# Patient Record
Sex: FEMALE | Race: WHITE | NOT HISPANIC OR LATINO | URBAN - METROPOLITAN AREA
[De-identification: names, ages, dates, MRNs, and addresses within clinical notes are randomized per-mention and may not be internally consistent; named-entity substitution may affect disease eponyms.]

---

## 2017-11-10 ENCOUNTER — EMERGENCY (EMERGENCY)
Facility: HOSPITAL | Age: 42
LOS: 1 days | Discharge: HOME | End: 2017-11-10
Attending: EMERGENCY MEDICINE | Admitting: EMERGENCY MEDICINE
Payer: COMMERCIAL

## 2017-11-10 VITALS
SYSTOLIC BLOOD PRESSURE: 125 MMHG | DIASTOLIC BLOOD PRESSURE: 80 MMHG | OXYGEN SATURATION: 97 % | TEMPERATURE: 98 F | RESPIRATION RATE: 16 BRPM | HEART RATE: 81 BPM

## 2017-11-10 VITALS
HEART RATE: 108 BPM | RESPIRATION RATE: 18 BRPM | OXYGEN SATURATION: 95 % | DIASTOLIC BLOOD PRESSURE: 84 MMHG | WEIGHT: 186.07 LBS | SYSTOLIC BLOOD PRESSURE: 154 MMHG | HEIGHT: 66 IN | TEMPERATURE: 98 F

## 2017-11-10 PROBLEM — Z00.00 ENCOUNTER FOR PREVENTIVE HEALTH EXAMINATION: Status: ACTIVE | Noted: 2017-11-10

## 2017-11-10 LAB
ALBUMIN SERPL ELPH-MCNC: 3.5 G/DL — SIGNIFICANT CHANGE UP (ref 3.3–5)
ALP SERPL-CCNC: 76 U/L — SIGNIFICANT CHANGE UP (ref 40–120)
ALT FLD-CCNC: 21 U/L — SIGNIFICANT CHANGE UP (ref 12–78)
ANION GAP SERPL CALC-SCNC: 9 MMOL/L — SIGNIFICANT CHANGE UP (ref 5–17)
APPEARANCE UR: CLEAR — SIGNIFICANT CHANGE UP
AST SERPL-CCNC: 13 U/L — LOW (ref 15–37)
BASOPHILS # BLD AUTO: 0.1 K/UL — SIGNIFICANT CHANGE UP (ref 0–0.2)
BASOPHILS NFR BLD AUTO: 0.9 % — SIGNIFICANT CHANGE UP (ref 0–2)
BILIRUB SERPL-MCNC: 0.3 MG/DL — SIGNIFICANT CHANGE UP (ref 0.2–1.2)
BILIRUB UR-MCNC: NEGATIVE — SIGNIFICANT CHANGE UP
BUN SERPL-MCNC: 10 MG/DL — SIGNIFICANT CHANGE UP (ref 7–23)
CALCIUM SERPL-MCNC: 8.2 MG/DL — LOW (ref 8.5–10.1)
CHLORIDE SERPL-SCNC: 107 MMOL/L — SIGNIFICANT CHANGE UP (ref 96–108)
CO2 SERPL-SCNC: 24 MMOL/L — SIGNIFICANT CHANGE UP (ref 22–31)
COLOR SPEC: YELLOW — SIGNIFICANT CHANGE UP
CREAT SERPL-MCNC: 0.87 MG/DL — SIGNIFICANT CHANGE UP (ref 0.5–1.3)
D DIMER BLD IA.RAPID-MCNC: 216 NG/ML DDU — SIGNIFICANT CHANGE UP
DIFF PNL FLD: NEGATIVE — SIGNIFICANT CHANGE UP
EOSINOPHIL # BLD AUTO: 0.4 K/UL — SIGNIFICANT CHANGE UP (ref 0–0.5)
EOSINOPHIL NFR BLD AUTO: 3.5 % — SIGNIFICANT CHANGE UP (ref 0–6)
EPI CELLS # UR: ABNORMAL
GLUCOSE SERPL-MCNC: 79 MG/DL — SIGNIFICANT CHANGE UP (ref 70–99)
GLUCOSE UR QL: NEGATIVE — SIGNIFICANT CHANGE UP
HCG SERPL-ACNC: <1 MIU/ML — SIGNIFICANT CHANGE UP
HCT VFR BLD CALC: 42.4 % — SIGNIFICANT CHANGE UP (ref 34.5–45)
HGB BLD-MCNC: 13.9 G/DL — SIGNIFICANT CHANGE UP (ref 11.5–15.5)
KETONES UR-MCNC: NEGATIVE — SIGNIFICANT CHANGE UP
LACTATE SERPL-SCNC: 0.9 MMOL/L — SIGNIFICANT CHANGE UP (ref 0.7–2)
LEUKOCYTE ESTERASE UR-ACNC: ABNORMAL
LYMPHOCYTES # BLD AUTO: 1.3 K/UL — SIGNIFICANT CHANGE UP (ref 1–3.3)
LYMPHOCYTES # BLD AUTO: 11.5 % — LOW (ref 13–44)
MCHC RBC-ENTMCNC: 29.9 PG — SIGNIFICANT CHANGE UP (ref 27–34)
MCHC RBC-ENTMCNC: 32.8 GM/DL — SIGNIFICANT CHANGE UP (ref 32–36)
MCV RBC AUTO: 91.3 FL — SIGNIFICANT CHANGE UP (ref 80–100)
MONOCYTES # BLD AUTO: 0.6 K/UL — SIGNIFICANT CHANGE UP (ref 0–0.9)
MONOCYTES NFR BLD AUTO: 5.6 % — SIGNIFICANT CHANGE UP (ref 1–9)
NEUTROPHILS # BLD AUTO: 9 K/UL — HIGH (ref 1.8–7.4)
NEUTROPHILS NFR BLD AUTO: 78.4 % — HIGH (ref 43–77)
NITRITE UR-MCNC: NEGATIVE — SIGNIFICANT CHANGE UP
PH UR: 8 — SIGNIFICANT CHANGE UP (ref 5–8)
PLATELET # BLD AUTO: 372 K/UL — SIGNIFICANT CHANGE UP (ref 150–400)
POTASSIUM SERPL-MCNC: 4.2 MMOL/L — SIGNIFICANT CHANGE UP (ref 3.5–5.3)
POTASSIUM SERPL-SCNC: 4.2 MMOL/L — SIGNIFICANT CHANGE UP (ref 3.5–5.3)
PROT SERPL-MCNC: 7.6 G/DL — SIGNIFICANT CHANGE UP (ref 6–8.3)
PROT UR-MCNC: NEGATIVE — SIGNIFICANT CHANGE UP
RBC # BLD: 4.64 M/UL — SIGNIFICANT CHANGE UP (ref 3.8–5.2)
RBC # FLD: 14.5 % — SIGNIFICANT CHANGE UP (ref 10.3–14.5)
RBC CASTS # UR COMP ASSIST: SIGNIFICANT CHANGE UP /HPF (ref 0–4)
SODIUM SERPL-SCNC: 140 MMOL/L — SIGNIFICANT CHANGE UP (ref 135–145)
SP GR SPEC: 1 — LOW (ref 1.01–1.02)
UROBILINOGEN FLD QL: NEGATIVE — SIGNIFICANT CHANGE UP
WBC # BLD: 11.4 K/UL — HIGH (ref 3.8–10.5)
WBC # FLD AUTO: 11.4 K/UL — HIGH (ref 3.8–10.5)
WBC UR QL: ABNORMAL

## 2017-11-10 PROCEDURE — 85379 FIBRIN DEGRADATION QUANT: CPT

## 2017-11-10 PROCEDURE — 99284 EMERGENCY DEPT VISIT MOD MDM: CPT | Mod: 25

## 2017-11-10 PROCEDURE — 96374 THER/PROPH/DIAG INJ IV PUSH: CPT | Mod: XU

## 2017-11-10 PROCEDURE — 83605 ASSAY OF LACTIC ACID: CPT

## 2017-11-10 PROCEDURE — 71275 CT ANGIOGRAPHY CHEST: CPT | Mod: 26

## 2017-11-10 PROCEDURE — 84702 CHORIONIC GONADOTROPIN TEST: CPT

## 2017-11-10 PROCEDURE — 80053 COMPREHEN METABOLIC PANEL: CPT

## 2017-11-10 PROCEDURE — 94640 AIRWAY INHALATION TREATMENT: CPT

## 2017-11-10 PROCEDURE — 85027 COMPLETE CBC AUTOMATED: CPT

## 2017-11-10 PROCEDURE — 87040 BLOOD CULTURE FOR BACTERIA: CPT

## 2017-11-10 PROCEDURE — 99285 EMERGENCY DEPT VISIT HI MDM: CPT

## 2017-11-10 PROCEDURE — 81001 URINALYSIS AUTO W/SCOPE: CPT

## 2017-11-10 PROCEDURE — 71275 CT ANGIOGRAPHY CHEST: CPT

## 2017-11-10 RX ORDER — MONTELUKAST 4 MG/1
10 TABLET, CHEWABLE ORAL ONCE
Qty: 0 | Refills: 0 | Status: COMPLETED | OUTPATIENT
Start: 2017-11-10 | End: 2017-11-10

## 2017-11-10 RX ORDER — FLUTICASONE PROPIONATE 50 MCG
2 SPRAY, SUSPENSION NASAL ONCE
Qty: 0 | Refills: 0 | Status: COMPLETED | OUTPATIENT
Start: 2017-11-10 | End: 2017-11-10

## 2017-11-10 RX ORDER — IPRATROPIUM/ALBUTEROL SULFATE 18-103MCG
3 AEROSOL WITH ADAPTER (GRAM) INHALATION
Qty: 84 | Refills: 0 | OUTPATIENT
Start: 2017-11-10 | End: 2017-11-17

## 2017-11-10 RX ORDER — IPRATROPIUM/ALBUTEROL SULFATE 18-103MCG
3 AEROSOL WITH ADAPTER (GRAM) INHALATION ONCE
Qty: 0 | Refills: 0 | Status: COMPLETED | OUTPATIENT
Start: 2017-11-10 | End: 2017-11-10

## 2017-11-10 RX ORDER — ALPRAZOLAM 0.25 MG
0.5 TABLET ORAL ONCE
Qty: 0 | Refills: 0 | Status: DISCONTINUED | OUTPATIENT
Start: 2017-11-10 | End: 2017-11-10

## 2017-11-10 RX ORDER — KETOROLAC TROMETHAMINE 30 MG/ML
30 SYRINGE (ML) INJECTION ONCE
Qty: 0 | Refills: 0 | Status: DISCONTINUED | OUTPATIENT
Start: 2017-11-10 | End: 2017-11-10

## 2017-11-10 RX ORDER — MONTELUKAST 4 MG/1
1 TABLET, CHEWABLE ORAL
Qty: 30 | Refills: 0 | OUTPATIENT
Start: 2017-11-10 | End: 2017-12-10

## 2017-11-10 RX ORDER — SODIUM CHLORIDE 9 MG/ML
1000 INJECTION INTRAMUSCULAR; INTRAVENOUS; SUBCUTANEOUS ONCE
Qty: 0 | Refills: 0 | Status: COMPLETED | OUTPATIENT
Start: 2017-11-10 | End: 2017-11-10

## 2017-11-10 RX ORDER — ALPRAZOLAM 0.25 MG
1 TABLET ORAL
Qty: 15 | Refills: 0
Start: 2017-11-10

## 2017-11-10 RX ADMIN — Medication 2 SPRAY(S): at 11:28

## 2017-11-10 RX ADMIN — Medication 3 MILLILITER(S): at 10:46

## 2017-11-10 RX ADMIN — Medication 30 MILLIGRAM(S): at 13:12

## 2017-11-10 RX ADMIN — MONTELUKAST 10 MILLIGRAM(S): 4 TABLET, CHEWABLE ORAL at 11:28

## 2017-11-10 RX ADMIN — Medication 0.5 MILLIGRAM(S): at 14:23

## 2017-11-10 RX ADMIN — SODIUM CHLORIDE 1000 MILLILITER(S): 9 INJECTION INTRAMUSCULAR; INTRAVENOUS; SUBCUTANEOUS at 10:46

## 2017-11-10 NOTE — ED PROVIDER NOTE - MEDICAL DECISION MAKING DETAILS
cough sob pleuritic pain ro pe ro pn ro rad exacerbation vs exacerbation of anxiety and uri in complex pt with above as pmhx

## 2017-11-10 NOTE — ED ADULT NURSE NOTE - OBJECTIVE STATEMENT
Afebrile pt to ed c/o SOB due to severe sinus, chest congestion x 5 weeks. pt was to PMD was prescribed several medications and still feels ill.

## 2017-11-10 NOTE — ED PROVIDER NOTE - OBJECTIVE STATEMENT
Pt is a 43 yo f who has hx of anxiety, Asthma and Crohn's OCD, on 6-MP prilosec has been coughing with cold like symptoms since 10/19.  she has been on various regimines of nebs steroids etc by pmd, urgent care and NP at pmd office. still not better, coughing feeling panicked and short of breath (she is usually a runner) with back pain. she came to er today not able to wait for the appoinement by her pmd monday. Pt is a 43 yo f who has hx of anxiety, Asthma and Crohn's OCD, on 6-MP prilosec has been coughing with cold like symptoms since 10/19.  she has been on various regimines of nebs steroids etc by pmd, urgent care and NP at pmd office. still not better, coughing feeling panicked and short of breath (she is usually a runner) with back pain. she came to er today not able to wait for the appointment by her pmd monday.

## 2017-11-10 NOTE — ED PROVIDER NOTE - ENMT, MLM
Airway patent, Nasal mucosa clear. Mouth with normal mucosa. OP post nasal drip. Throat has no vesicles, no oropharyngeal exudates and uvula is midline. tm's clear

## 2017-11-10 NOTE — ED PROVIDER NOTE - PSYCHIATRIC, MLM
Alert and oriented to person, place, time/situation. appropriate but slightly anxious mood and affect. no apparent risk to self or others.

## 2017-11-12 ENCOUNTER — TRANSCRIPTION ENCOUNTER (OUTPATIENT)
Age: 42
End: 2017-11-12

## 2017-11-15 LAB
CULTURE RESULTS: SIGNIFICANT CHANGE UP
CULTURE RESULTS: SIGNIFICANT CHANGE UP
SPECIMEN SOURCE: SIGNIFICANT CHANGE UP
SPECIMEN SOURCE: SIGNIFICANT CHANGE UP

## 2017-11-17 ENCOUNTER — INPATIENT (INPATIENT)
Facility: HOSPITAL | Age: 42
LOS: 4 days | Discharge: HOME | DRG: 194 | End: 2017-11-22
Attending: INTERNAL MEDICINE | Admitting: INTERNAL MEDICINE
Payer: COMMERCIAL

## 2017-11-17 VITALS
HEIGHT: 66 IN | WEIGHT: 179.9 LBS | DIASTOLIC BLOOD PRESSURE: 82 MMHG | TEMPERATURE: 98 F | SYSTOLIC BLOOD PRESSURE: 138 MMHG | OXYGEN SATURATION: 98 % | RESPIRATION RATE: 16 BRPM | HEART RATE: 98 BPM

## 2017-11-17 DIAGNOSIS — R06.02 SHORTNESS OF BREATH: ICD-10-CM

## 2017-11-17 DIAGNOSIS — R07.9 CHEST PAIN, UNSPECIFIED: ICD-10-CM

## 2017-11-17 DIAGNOSIS — K50.90 CROHN'S DISEASE, UNSPECIFIED, WITHOUT COMPLICATIONS: ICD-10-CM

## 2017-11-17 DIAGNOSIS — R21 RASH AND OTHER NONSPECIFIC SKIN ERUPTION: ICD-10-CM

## 2017-11-17 DIAGNOSIS — K21.9 GASTRO-ESOPHAGEAL REFLUX DISEASE WITHOUT ESOPHAGITIS: ICD-10-CM

## 2017-11-17 DIAGNOSIS — R06.89 OTHER ABNORMALITIES OF BREATHING: ICD-10-CM

## 2017-11-17 DIAGNOSIS — Z29.9 ENCOUNTER FOR PROPHYLACTIC MEASURES, UNSPECIFIED: ICD-10-CM

## 2017-11-17 DIAGNOSIS — J45.909 UNSPECIFIED ASTHMA, UNCOMPLICATED: ICD-10-CM

## 2017-11-17 DIAGNOSIS — F41.9 ANXIETY DISORDER, UNSPECIFIED: ICD-10-CM

## 2017-11-17 LAB
ALBUMIN SERPL ELPH-MCNC: 3.5 G/DL — SIGNIFICANT CHANGE UP (ref 3.3–5)
ALP SERPL-CCNC: 71 U/L — SIGNIFICANT CHANGE UP (ref 40–120)
ALT FLD-CCNC: 19 U/L — SIGNIFICANT CHANGE UP (ref 12–78)
AMPHET UR-MCNC: NEGATIVE — SIGNIFICANT CHANGE UP
ANION GAP SERPL CALC-SCNC: 10 MMOL/L — SIGNIFICANT CHANGE UP (ref 5–17)
APTT BLD: 29.2 SEC — SIGNIFICANT CHANGE UP (ref 27.5–37.4)
AST SERPL-CCNC: 11 U/L — LOW (ref 15–37)
BARBITURATES UR SCN-MCNC: NEGATIVE — SIGNIFICANT CHANGE UP
BASOPHILS # BLD AUTO: 0.1 K/UL — SIGNIFICANT CHANGE UP (ref 0–0.2)
BASOPHILS NFR BLD AUTO: 0.3 % — SIGNIFICANT CHANGE UP (ref 0–2)
BENZODIAZ UR-MCNC: POSITIVE — SIGNIFICANT CHANGE UP
BILIRUB SERPL-MCNC: 0.3 MG/DL — SIGNIFICANT CHANGE UP (ref 0.2–1.2)
BUN SERPL-MCNC: 10 MG/DL — SIGNIFICANT CHANGE UP (ref 7–23)
CALCIUM SERPL-MCNC: 8.5 MG/DL — SIGNIFICANT CHANGE UP (ref 8.5–10.1)
CHLORIDE SERPL-SCNC: 107 MMOL/L — SIGNIFICANT CHANGE UP (ref 96–108)
CO2 SERPL-SCNC: 24 MMOL/L — SIGNIFICANT CHANGE UP (ref 22–31)
COCAINE METAB.OTHER UR-MCNC: NEGATIVE — SIGNIFICANT CHANGE UP
CREAT SERPL-MCNC: 0.92 MG/DL — SIGNIFICANT CHANGE UP (ref 0.5–1.3)
EOSINOPHIL # BLD AUTO: 0 K/UL — SIGNIFICANT CHANGE UP (ref 0–0.5)
EOSINOPHIL NFR BLD AUTO: 0.1 % — SIGNIFICANT CHANGE UP (ref 0–6)
GLUCOSE SERPL-MCNC: 110 MG/DL — HIGH (ref 70–99)
HCG SERPL-ACNC: <1 MIU/ML — SIGNIFICANT CHANGE UP
HCT VFR BLD CALC: 40.6 % — SIGNIFICANT CHANGE UP (ref 34.5–45)
HGB BLD-MCNC: 13.5 G/DL — SIGNIFICANT CHANGE UP (ref 11.5–15.5)
INR BLD: 1.07 RATIO — SIGNIFICANT CHANGE UP (ref 0.88–1.16)
LACTATE SERPL-SCNC: 1.8 MMOL/L — SIGNIFICANT CHANGE UP (ref 0.7–2)
LYMPHOCYTES # BLD AUTO: 0.5 K/UL — LOW (ref 1–3.3)
LYMPHOCYTES # BLD AUTO: 2.8 % — LOW (ref 13–44)
MCHC RBC-ENTMCNC: 30.4 PG — SIGNIFICANT CHANGE UP (ref 27–34)
MCHC RBC-ENTMCNC: 33.2 GM/DL — SIGNIFICANT CHANGE UP (ref 32–36)
MCV RBC AUTO: 91.7 FL — SIGNIFICANT CHANGE UP (ref 80–100)
METHADONE UR-MCNC: NEGATIVE — SIGNIFICANT CHANGE UP
MONOCYTES # BLD AUTO: 0.2 K/UL — SIGNIFICANT CHANGE UP (ref 0–0.9)
MONOCYTES NFR BLD AUTO: 1.4 % — SIGNIFICANT CHANGE UP (ref 1–9)
NEUTROPHILS # BLD AUTO: 17.3 K/UL — HIGH (ref 1.8–7.4)
NEUTROPHILS NFR BLD AUTO: 95.4 % — HIGH (ref 43–77)
OPIATES UR-MCNC: NEGATIVE — SIGNIFICANT CHANGE UP
PCP SPEC-MCNC: SIGNIFICANT CHANGE UP
PCP UR-MCNC: NEGATIVE — SIGNIFICANT CHANGE UP
PLATELET # BLD AUTO: 353 K/UL — SIGNIFICANT CHANGE UP (ref 150–400)
POTASSIUM SERPL-MCNC: 3.8 MMOL/L — SIGNIFICANT CHANGE UP (ref 3.5–5.3)
POTASSIUM SERPL-SCNC: 3.8 MMOL/L — SIGNIFICANT CHANGE UP (ref 3.5–5.3)
PROT SERPL-MCNC: 7.8 G/DL — SIGNIFICANT CHANGE UP (ref 6–8.3)
PROTHROM AB SERPL-ACNC: 11.7 SEC — SIGNIFICANT CHANGE UP (ref 9.8–12.7)
RBC # BLD: 4.43 M/UL — SIGNIFICANT CHANGE UP (ref 3.8–5.2)
RBC # FLD: 14.8 % — HIGH (ref 10.3–14.5)
SODIUM SERPL-SCNC: 141 MMOL/L — SIGNIFICANT CHANGE UP (ref 135–145)
THC UR QL: NEGATIVE — SIGNIFICANT CHANGE UP
WBC # BLD: 18.1 K/UL — HIGH (ref 3.8–10.5)
WBC # FLD AUTO: 18.1 K/UL — HIGH (ref 3.8–10.5)

## 2017-11-17 PROCEDURE — 99223 1ST HOSP IP/OBS HIGH 75: CPT

## 2017-11-17 PROCEDURE — 71020: CPT | Mod: 26

## 2017-11-17 PROCEDURE — 93010 ELECTROCARDIOGRAM REPORT: CPT

## 2017-11-17 PROCEDURE — 99285 EMERGENCY DEPT VISIT HI MDM: CPT

## 2017-11-17 RX ORDER — ALPRAZOLAM 0.25 MG
0.5 TABLET ORAL DAILY
Qty: 0 | Refills: 0 | Status: DISCONTINUED | OUTPATIENT
Start: 2017-11-17 | End: 2017-11-22

## 2017-11-17 RX ORDER — ALBUTEROL 90 UG/1
0 AEROSOL, METERED ORAL
Qty: 0 | Refills: 0 | COMMUNITY

## 2017-11-17 RX ORDER — PANTOPRAZOLE SODIUM 20 MG/1
40 TABLET, DELAYED RELEASE ORAL
Qty: 0 | Refills: 0 | Status: DISCONTINUED | OUTPATIENT
Start: 2017-11-17 | End: 2017-11-22

## 2017-11-17 RX ORDER — BUDESONIDE AND FORMOTEROL FUMARATE DIHYDRATE 160; 4.5 UG/1; UG/1
2 AEROSOL RESPIRATORY (INHALATION)
Qty: 0 | Refills: 0 | Status: DISCONTINUED | OUTPATIENT
Start: 2017-11-17 | End: 2017-11-22

## 2017-11-17 RX ORDER — MERCAPTOPURINE 50 MG/1
75 TABLET ORAL DAILY
Qty: 0 | Refills: 0 | Status: DISCONTINUED | OUTPATIENT
Start: 2017-11-17 | End: 2017-11-17

## 2017-11-17 RX ORDER — IPRATROPIUM/ALBUTEROL SULFATE 18-103MCG
3 AEROSOL WITH ADAPTER (GRAM) INHALATION ONCE
Qty: 0 | Refills: 0 | Status: COMPLETED | OUTPATIENT
Start: 2017-11-17 | End: 2017-11-17

## 2017-11-17 RX ORDER — SODIUM CHLORIDE 0.65 %
1 AEROSOL, SPRAY (ML) NASAL THREE TIMES A DAY
Qty: 0 | Refills: 0 | Status: DISCONTINUED | OUTPATIENT
Start: 2017-11-17 | End: 2017-11-22

## 2017-11-17 RX ORDER — MERCAPTOPURINE 50 MG/1
75 TABLET ORAL DAILY
Qty: 0 | Refills: 0 | Status: DISCONTINUED | OUTPATIENT
Start: 2017-11-18 | End: 2017-11-22

## 2017-11-17 RX ORDER — ALBUTEROL 90 UG/1
2 AEROSOL, METERED ORAL EVERY 6 HOURS
Qty: 0 | Refills: 0 | Status: DISCONTINUED | OUTPATIENT
Start: 2017-11-17 | End: 2017-11-22

## 2017-11-17 RX ORDER — POLYETHYLENE GLYCOL 3350 17 G/17G
17 POWDER, FOR SOLUTION ORAL DAILY
Qty: 0 | Refills: 0 | Status: DISCONTINUED | OUTPATIENT
Start: 2017-11-17 | End: 2017-11-22

## 2017-11-17 RX ORDER — MERCAPTOPURINE 50 MG/1
0 TABLET ORAL
Qty: 0 | Refills: 0 | COMMUNITY

## 2017-11-17 RX ORDER — OMEPRAZOLE 10 MG/1
1 CAPSULE, DELAYED RELEASE ORAL
Qty: 0 | Refills: 0 | COMMUNITY

## 2017-11-17 RX ORDER — DULOXETINE HYDROCHLORIDE 30 MG/1
60 CAPSULE, DELAYED RELEASE ORAL DAILY
Qty: 0 | Refills: 0 | Status: DISCONTINUED | OUTPATIENT
Start: 2017-11-17 | End: 2017-11-22

## 2017-11-17 RX ORDER — OMEPRAZOLE 10 MG/1
0 CAPSULE, DELAYED RELEASE ORAL
Qty: 0 | Refills: 0 | COMMUNITY

## 2017-11-17 RX ADMIN — Medication 3 MILLILITER(S): at 16:15

## 2017-11-17 RX ADMIN — BUDESONIDE AND FORMOTEROL FUMARATE DIHYDRATE 2 PUFF(S): 160; 4.5 AEROSOL RESPIRATORY (INHALATION) at 20:41

## 2017-11-17 RX ADMIN — Medication 0.5 MILLIGRAM(S): at 22:27

## 2017-11-17 RX ADMIN — Medication 125 MILLIGRAM(S): at 16:15

## 2017-11-17 NOTE — H&P ADULT - ASSESSMENT
43 y/o f pmhx anxiety, Crohn's disease on 6-MP, asthma, GERD presented with SOB since October 19 with recent courses of failed PO abx and steroids admitted for SOB and bronchoscopy.

## 2017-11-17 NOTE — CONSULT NOTE ADULT - ASSESSMENT
42F with PMH anxiety, Crohn's disease on 6-MP, asthma presents with SOB since October 19.     -Chest pain, atypical. Likely due to viral respiratory infection. Reports symptoms associated with cough.   -Doubt ACS. EKG without ischemic changes.   -Encourage oral hydration  -HR high normal, likely from underlying infection.   -BP acceptable. 42F with PMH anxiety, Crohn's disease on 6-MP, asthma presents with SOB since October 19.     -Chest pain, atypical. Likely due to viral respiratory infection. Reports symptoms associated with cough. Chest x-ray: clear lungs.   -Doubt ACS. EKG without ischemic changes.   -Encourage oral hydration  -HR high normal, likely from underlying infection.   -BP acceptable. 42F with PMH anxiety, Crohn's disease on 6-MP, asthma presents with SOB since October 19.     -Chest pain, atypical. Likely due to respiratory infection. Reports symptoms associated with cough. Chest x-ray: clear lungs.   -Plan for bronchoscopy Monday. Currently no active cardiac conditions. No signs of ischemia, ADHF, clinical exam not consistent with stenotic valvular disease, no unstable arrythmias noted. Therefore able to proceed with this low risk procedure without any further cardiac workup. Routine hemodynamic monitoring is suggested during the procedure.   -Able to walk >4 METS without any anginal symptoms.   -Doubt ACS. EKG without ischemic changes.   -Follows outpatient cardiologist, reports normal echo and coronary CT.  -HR high normal, likely from underlying infection.   -BP acceptable.   -Monitor electrolytes, keep K>4, Mg>2 42F with PMH anxiety, Crohn's disease on 6-MP, asthma presents with SOB since October 19.     -Chest pain, atypical. Likely due to respiratory infection. Reports symptoms associated with cough. Chest x-ray: clear lungs.   -Plan for bronchoscopy Monday. Currently no active cardiac conditions. No signs of ischemia, ADHF, clinical exam not consistent with stenotic valvular disease, no unstable arrythmias noted. Therefore able to proceed with this low risk procedure without any further cardiac workup. Routine hemodynamic monitoring is suggested during the procedure.   -Able to walk >4 METS without any anginal symptoms.   -Follows outpatient cardiologist Dr. Garay, reports normal echo and coronary CT.  Dr. Garay to assume care tomorrow.  -HR high normal, likely from underlying pulmonary process  -BP acceptable.   -Monitor electrolytes, keep K>4, Mg>2

## 2017-11-17 NOTE — H&P ADULT - PROBLEM SELECTOR PLAN 2
Atypical, likely 2/2 tightness from cough/asthma symptoms  -Continue Proventil, Symbicort  -F/u cardiac enzymes Atypical, likely 2/2 tightness from cough/asthma symptoms  -Dr. Brothers consulted, appreciate recs  -F/u cardiac enzymes

## 2017-11-17 NOTE — H&P ADULT - PMH
Anxiety    Asthma    Crohn's disease    GERD (gastroesophageal reflux disease)    OCD (obsessive compulsive disorder)

## 2017-11-17 NOTE — ED PROVIDER NOTE - CARE PLAN
Principal Discharge DX:	Chest pain, unspecified type  Secondary Diagnosis:	SOB (shortness of breath)  Secondary Diagnosis:	Cough

## 2017-11-17 NOTE — H&P ADULT - HISTORY OF PRESENT ILLNESS
43 y/o f pmhx anxiety, Crohn's disease on 6-MP, asthma, GERD presented with SOB since October 19. Reported drinking a large [employer] coffee in the beginning of October and noticed mold at the bottom of the cup. A few days later, developed fever 101-102, cough with productive green sputum, and chest tightness associated with the cough. The patient was seen by her PMD who rx Augmentin. Fever resolved, productive cough continued. The following week she was seen by urgent care as she was not better and sent home with steroids. The cough/SOB did not resolve and she was seen by Pulmonologist, Dr. Castaneda and given Levaquin, inhaler, and prednisone. She did not feel better and saw an allergist who took her off of all of the medications. Since she continued to have cough/SOB she returned to her pulmonologist who put her back on 60mg qd of Prednisone. Pt admitted to productive cough with green sputum, headache, stuffy nose. She also admitted to 1 episode of blood tinged sputum weeks ago which has not returned and was attributed to trauma from the cough. She  Denied sore throat, fever, chills, abd pain, n/v/d.  Reported daughter at home is sick with sinusitis. Denied recent travel.     Of note, patient recently in the ED on November 10/2017 for similar symptoms. Blood cultures negative. CT angio showed subtle bilateral micronodular groundglass centrilobular tree-in-bud opacities; which may reflect a bronchiolitis. No lobar lung consolidation or pleural effusion.     In the ED: /82, HR 98  WBC 18.1  CXR: The lungs are clear. The previously noted micronodular groundglass centrilobular opacities are not well seen on chest x-ray examination. The heart size is normal. The visualized osseous structures are unremarkable. 41 y/o f pmhx anxiety, Crohn's disease on 6-MP, asthma, GERD presented with SOB since October 19. Reported drinking a large [employer] coffee in the beginning of October and noticed mold at the bottom of the cup. A few days later, developed fever 101-102, cough with productive green sputum, and chest tightness associated with the cough. The patient was seen by her PMD who rx Augmentin. Fever resolved, productive cough continued. The following week she was seen by urgent care as she was not better and sent home with steroids. The cough/SOB did not resolve and she was seen by Pulmonologist, Dr. Castaneda and given Levaquin, inhaler, and prednisone. She did not feel better and saw an allergist who took her off of all of the medications. Since she continued to have cough/SOB she returned to her pulmonologist who put her back on 60mg qd of Prednisone. Pt admitted to productive cough with green sputum, headache, stuffy nose. She also admitted to 1 episode of blood tinged sputum weeks ago which has not returned and was attributed to trauma from the cough. She  Denied sore throat, fever, chills, abd pain, n/v/d.  Reported daughter at home is sick with sinusitis. Denied recent travel.     Of note, patient recently in the ED on November 10/2017 for similar symptoms. Blood cultures negative. CT angio showed subtle bilateral micronodular groundglass centrilobular tree-in-bud opacities; which may reflect a bronchiolitis. No lobar lung consolidation or pleural effusion.     In the ED: /82, HR 98  WBC 18.1  CXR: The lungs are clear. The previously noted micronodular groundglass centrilobular opacities are not well seen on chest x-ray examination. The heart size is normal. The visualized osseous structures are unremarkable.  Pt received IV solumedrol and duonebs.

## 2017-11-17 NOTE — H&P ADULT - PROBLEM SELECTOR PLAN 3
-Continue  -Geovanna and Aris arnold prn for cough -Continue Proventil, Symbicort  -Carolesen and Aris arnold prn for cough

## 2017-11-17 NOTE — H&P ADULT - ATTENDING COMMENTS
patrient seen and examined   pt is a return patient --she was here with a cough and sob since october.  ct scan of the lungs showed tree bud apperance.  has been on streoids for the past month with no help.  pulm consult appreciated.  pt is scgheduled to get a bhronscopy on monday  will continue steroids and abx form now  and prepare for bronch on monday

## 2017-11-17 NOTE — CONSULT NOTE ADULT - PROBLEM SELECTOR RECOMMENDATION 9
ct chest reviewed  spoke with Dr. Amaro - Pulmonary medicine  will start robitussin, tessalon perles PRN  proventil PRN  symbicort   hold off on steroids and ABX  Bronchoscopy on Monday, discussed with pt and family  check Lupus profile, crp, procalcitonin, IgE, IgG subsets, aspergillus,   thyroid panel, toxicology panel  hypersensitivity panel  sputum cx and sx

## 2017-11-17 NOTE — ED ADULT NURSE NOTE - OBJECTIVE STATEMENT
Pt presents with sob, recently diagnosed with bronchitis, currently states she is anxious and EKG presents with tachycardia, reports chest pain when coughs, the cough is productive, green Pt presents with sob, recently diagnosed with bronchitis, currently states she is anxious and EKG presents with tachycardia, reports chest pain when coughs, the cough is productive, green sputum, placed on cardiac monitor and pulse ox, educated on plan of care and use of medications, verbalized understanding, awaiting dispo

## 2017-11-17 NOTE — H&P ADULT - NSHPREVIEWOFSYSTEMS_GEN_ALL_CORE
Constitutional: denies fever, chills, diaphoresis   HEENT: denies blurry vision, double vision, eye pain, difficulty hearing  Respiratory: +SOB, +GOLD, +cough, +green sputum production, denies wheezing, +hemoptysis (x1 episode, now resolved)  Cardiovascular: denies CP, palpitations, edema, +elevated HR sometimes  Gastrointestinal: denies nausea, vomiting, diarrhea, constipation, abdominal pain, melena, hematochezia   Genitourinary: denies dysuria, frequency, urgency, hematuria   Skin/Breast: denies rash, itching  Musculoskeletal: denies myalgias, joint swelling, muscle weakness  Neurologic: denies headache, weakness, dizziness, paresthesias, numbness/tingling  Psychiatric: +anxiety, denies depressed, suicidal, homicidal thoughts  Hematology/Oncology: denies bruising, tender or enlarged lymph nodes   ROS negative except as noted above

## 2017-11-17 NOTE — ED PROVIDER NOTE - OBJECTIVE STATEMENT
pt c/o 4 weeks of sob and cough with chest discomfort. pt seen mult times as outpt and once in er last week, had labs/cta, dx with bronchiolitis. pt had courses of augmentin, prednisone, levaquin, without improvement. no fevers, chills, ha, abd pain, edema, calf pain, travel.  pmd - cristi  pulm - oscar

## 2017-11-17 NOTE — H&P ADULT - PROBLEM SELECTOR PLAN 1
Admit to [facility]  -Dr. Voss consulted, appreciate recs  -Broncoscopy on Monday  -Proventil and Symbicort prn  -Geovanna and Aris arnold prn for cough  -Hold abx and steroids per pulm  -[place] spray Admit to [facility]  -Dr. Voss consulted, appreciate recs  -Broncoscopy on Monday  -Proventil and Symbicort prn  -Geovanna and Aris arnold prn for cough  -Hold steroids   -IV Levaquin  -IVF  -[place] spray  -F/u aspergillus Ab, certromere Ab, allergy panel, lupus profile, thyroid studies  -F/u blood and urine culture

## 2017-11-17 NOTE — H&P ADULT - NSHPPHYSICALEXAM_GEN_ALL_CORE
Physical Exam:  General: Well developed, well nourished, NAD  HEENT: NCAT, PERRLA, EOMI bl, moist mucous membranes   Neck: Supple, nontender, no mass  Neurology: A&Ox3, nonfocal, CN II-XII grossly intact, sensation intact, no gait abnormalities   Respiratory: CTA B/L, No W/R/R  CV: RRR, +S1/S2, no murmurs, rubs or gallops  Abdominal: Soft, NT, ND +BSx4  Extremities: No C/C/E, + peripheral pulses  MSK: Normal ROM, no joint erythema or warmth, no joint swelling   Skin: warm, dry, normal color, macular rash on mid to low back and collar of neck, non-painful, non-pruritic

## 2017-11-17 NOTE — ED ADULT TRIAGE NOTE - CHIEF COMPLAINT QUOTE
pt was sent by Dr. Castaneda (pulmonary) for SOB, r/o asthma/pneumonia pt was sent by Dr. Castaneda (pulmonary) for SOB, r/o pneumonia

## 2017-11-18 LAB
ANION GAP SERPL CALC-SCNC: 10 MMOL/L — SIGNIFICANT CHANGE UP (ref 5–17)
BUN SERPL-MCNC: 9 MG/DL — SIGNIFICANT CHANGE UP (ref 7–23)
CALCIUM SERPL-MCNC: 8.2 MG/DL — LOW (ref 8.5–10.1)
CENTROMERE AB SER-ACNC: <0.2 AI — SIGNIFICANT CHANGE UP
CHLORIDE SERPL-SCNC: 108 MMOL/L — SIGNIFICANT CHANGE UP (ref 96–108)
CO2 SERPL-SCNC: 24 MMOL/L — SIGNIFICANT CHANGE UP (ref 22–31)
CREAT SERPL-MCNC: 0.81 MG/DL — SIGNIFICANT CHANGE UP (ref 0.5–1.3)
CRP SERPL-MCNC: 0.4 MG/DL — SIGNIFICANT CHANGE UP (ref 0–0.4)
ENA JO1 AB SER-ACNC: <0.2 AI — SIGNIFICANT CHANGE UP
GLUCOSE SERPL-MCNC: 96 MG/DL — SIGNIFICANT CHANGE UP (ref 70–99)
HCT VFR BLD CALC: 36.7 % — SIGNIFICANT CHANGE UP (ref 34.5–45)
HGB BLD-MCNC: 12 G/DL — SIGNIFICANT CHANGE UP (ref 11.5–15.5)
IGE SERPL-ACNC: 20 IU/ML — SIGNIFICANT CHANGE UP (ref 0–100)
MCHC RBC-ENTMCNC: 30.1 PG — SIGNIFICANT CHANGE UP (ref 27–34)
MCHC RBC-ENTMCNC: 32.6 GM/DL — SIGNIFICANT CHANGE UP (ref 32–36)
MCV RBC AUTO: 92.4 FL — SIGNIFICANT CHANGE UP (ref 80–100)
PLATELET # BLD AUTO: 312 K/UL — SIGNIFICANT CHANGE UP (ref 150–400)
POTASSIUM SERPL-MCNC: 3.7 MMOL/L — SIGNIFICANT CHANGE UP (ref 3.5–5.3)
POTASSIUM SERPL-SCNC: 3.7 MMOL/L — SIGNIFICANT CHANGE UP (ref 3.5–5.3)
RBC # BLD: 3.97 M/UL — SIGNIFICANT CHANGE UP (ref 3.8–5.2)
RBC # FLD: 14.4 % — SIGNIFICANT CHANGE UP (ref 10.3–14.5)
SODIUM SERPL-SCNC: 142 MMOL/L — SIGNIFICANT CHANGE UP (ref 135–145)
T3 SERPL-MCNC: 75 NG/DL — LOW (ref 80–200)
T4 AB SER-ACNC: 6.4 UG/DL — SIGNIFICANT CHANGE UP (ref 4.6–12)
WBC # BLD: 13.6 K/UL — HIGH (ref 3.8–10.5)
WBC # FLD AUTO: 13.6 K/UL — HIGH (ref 3.8–10.5)

## 2017-11-18 RX ADMIN — Medication 0.5 MILLIGRAM(S): at 21:02

## 2017-11-18 RX ADMIN — ALBUTEROL 2 PUFF(S): 90 AEROSOL, METERED ORAL at 06:08

## 2017-11-18 RX ADMIN — POLYETHYLENE GLYCOL 3350 17 GRAM(S): 17 POWDER, FOR SOLUTION ORAL at 11:54

## 2017-11-18 RX ADMIN — Medication 1 SPRAY(S): at 21:03

## 2017-11-18 RX ADMIN — Medication 1 SPRAY(S): at 06:06

## 2017-11-18 RX ADMIN — DULOXETINE HYDROCHLORIDE 60 MILLIGRAM(S): 30 CAPSULE, DELAYED RELEASE ORAL at 11:54

## 2017-11-18 RX ADMIN — Medication 100 MILLIGRAM(S): at 10:09

## 2017-11-18 RX ADMIN — Medication 1 SPRAY(S): at 14:04

## 2017-11-18 RX ADMIN — BUDESONIDE AND FORMOTEROL FUMARATE DIHYDRATE 2 PUFF(S): 160; 4.5 AEROSOL RESPIRATORY (INHALATION) at 06:11

## 2017-11-18 RX ADMIN — MERCAPTOPURINE 75 MILLIGRAM(S): 50 TABLET ORAL at 11:53

## 2017-11-18 RX ADMIN — PANTOPRAZOLE SODIUM 40 MILLIGRAM(S): 20 TABLET, DELAYED RELEASE ORAL at 06:06

## 2017-11-18 RX ADMIN — Medication 1 SPRAY(S): at 00:50

## 2017-11-18 NOTE — PROGRESS NOTE ADULT - ASSESSMENT
41 y/o f pmhx anxiety, Crohn's disease on 6-MP, asthma, GERD presented with SOB since October 19 with recent courses of failed PO abx and steroids admitted for SOB and bronchoscopy.

## 2017-11-18 NOTE — PROGRESS NOTE ADULT - SUBJECTIVE AND OBJECTIVE BOX
Patient is a 42y old  Female who presents with a chief complaint of SOB cough (17 Nov 2017 19:41)      INTERVAL HPI/OVERNIGHT EVENTS: Patient seen and examined. NAD. Still SOB with exertion.    Vital Signs Last 24 Hrs  T(C): 36.6 (18 Nov 2017 05:03), Max: 36.7 (17 Nov 2017 14:40)  T(F): 97.9 (18 Nov 2017 05:03), Max: 98.1 (17 Nov 2017 20:26)  HR: 89 (18 Nov 2017 05:03) (89 - 105)  BP: 111/70 (18 Nov 2017 05:03) (111/70 - 138/82)  BP(mean): --  RR: 16 (18 Nov 2017 05:03) (16 - 18)  SpO2: 93% (18 Nov 2017 05:03) (93% - 99%)    11-18    142  |  108  |  9   ----------------------------<  96  3.7   |  24  |  0.81    Ca    8.2<L>      18 Nov 2017 06:22  Mg     2.1     11-17    TPro  7.8  /  Alb  3.5  /  TBili  0.3  /  DBili  x   /  AST  11<L>  /  ALT  19  /  AlkPhos  71  11-17                          12.0   13.6  )-----------( 312      ( 18 Nov 2017 06:22 )             36.7     PT/INR - ( 17 Nov 2017 16:24 )   PT: 11.7 sec;   INR: 1.07 ratio         PTT - ( 17 Nov 2017 16:24 )  PTT:29.2 sec  CAPILLARY BLOOD GLUCOSE              ALBUTerol    90 MICROgram(s) HFA Inhaler 2 Puff(s) Inhalation every 6 hours PRN  ALPRAZolam 0.5 milliGRAM(s) Oral daily PRN  benzonatate 100 milliGRAM(s) Oral every 8 hours PRN  buDESOnide  80 MICROgram(s)/formoterol 4.5 MICROgram(s) Inhaler 2 Puff(s) Inhalation two times a day  DULoxetine 60 milliGRAM(s) Oral daily  guaiFENesin    Syrup 200 milliGRAM(s) Oral every 6 hours PRN  mercaptopurine 75 milliGRAM(s) Oral daily  pantoprazole    Tablet 40 milliGRAM(s) Oral before breakfast  polyethylene glycol 3350 17 Gram(s) Oral daily  sodium chloride 0.65% Nasal 1 Spray(s) Both Nostrils three times a day      REVIEW OF SYSTEMS:  CONSTITUTIONAL: No fever, no weight loss, or no fatigue  NECK: No pain, no stiffness  RESPIRATORY: No cough, no wheezing, no chills, no hemoptysis, + shortness of breath  CARDIOVASCULAR: No chest pain, no palpitations, no dizziness, no leg swelling  GASTROINTESTINAL: No abdominal pain. No nausea, no vomiting, no hematemesis; No diarrhea, no constipation. No melena, no hematochezia.  GENITOURINARY: No dysuria, no frequency, no hematuria, no incontinence  NEUROLOGICAL: No headaches, no loss of strength, no numbness, no tremors  SKIN: No itching, no burning  MUSCULOSKELETAL: No joint pain, no swelling; No muscle, no back, no extremity pain  PSYCHIATRIC: No depression, no mood swings,   HEME/LYMPH: No easy bruising, no bleeding gums  ALLERY AND IMMUNOLOGIC: No hives       Consultant(s) Notes Reviewed:  [x ] YES  [ ] NO    PHYSICAL EXAM:  GENERAL: NAD  HEAD:  Atraumatic, Normocephalic  EYES: EOMI, PERRLA, conjunctiva and sclera clear  ENMT: No tonsillar erythema, exudates, or enlargement; Moist mucous membranes  NECK: Supple, No JVD  NERVOUS SYSTEM:  Awake & alert  CHEST/LUNG: ronchi L > R  HEART: Regular rate and rhythm  ABDOMEN: Soft, Nontender, Nondistended; Bowel sounds present  EXTREMITIES:  No clubbing, cyanosis, or edema  LYMPH: No lymphadenopathy noted  SKIN: No rashes    Advanced care planning discussed with patient/family. Advanced care planning forms reviewed/discussed/completed. 20 minutes spent.

## 2017-11-18 NOTE — PROGRESS NOTE ADULT - SUBJECTIVE AND OBJECTIVE BOX
Date/Time Patient Seen:  		  Referring MD:   Data Reviewed	       Patient is a 42y old  Female who presents with a chief complaint of SOB cough (17 Nov 2017 19:41)  in bed  seen and examined  on ABX      Subjective/HPI     PAST MEDICAL & SURGICAL HISTORY:  GERD (gastroesophageal reflux disease)  Anxiety  Asthma  Crohn's disease  OCD (obsessive compulsive disorder)  No significant past surgical history        Medication list         MEDICATIONS  (STANDING):  buDESOnide  80 MICROgram(s)/formoterol 4.5 MICROgram(s) Inhaler 2 Puff(s) Inhalation two times a day  DULoxetine 60 milliGRAM(s) Oral daily  mercaptopurine 75 milliGRAM(s) Oral daily  pantoprazole    Tablet 40 milliGRAM(s) Oral before breakfast  polyethylene glycol 3350 17 Gram(s) Oral daily  sodium chloride 0.65% Nasal 1 Spray(s) Both Nostrils three times a day    MEDICATIONS  (PRN):  ALBUTerol    90 MICROgram(s) HFA Inhaler 2 Puff(s) Inhalation every 6 hours PRN Shortness of Breath and/or Wheezing  ALPRAZolam 0.5 milliGRAM(s) Oral daily PRN anxiety  benzonatate 100 milliGRAM(s) Oral every 8 hours PRN Cough  guaiFENesin    Syrup 200 milliGRAM(s) Oral every 6 hours PRN Cough         Vitals log        ICU Vital Signs Last 24 Hrs  T(C): 36.6 (18 Nov 2017 05:03), Max: 36.7 (17 Nov 2017 14:40)  T(F): 97.9 (18 Nov 2017 05:03), Max: 98.1 (17 Nov 2017 20:26)  HR: 89 (18 Nov 2017 05:03) (89 - 105)  BP: 111/70 (18 Nov 2017 05:03) (111/70 - 138/82)  BP(mean): --  ABP: --  ABP(mean): --  RR: 16 (18 Nov 2017 05:03) (16 - 18)  SpO2: 93% (18 Nov 2017 05:03) (93% - 99%)           Input and Output:  I&O's Detail      Lab Data                        13.5   18.1  )-----------( 353      ( 17 Nov 2017 16:24 )             40.6     11-17    141  |  107  |  10  ----------------------------<  110<H>  3.8   |  24  |  0.92    Ca    8.5      17 Nov 2017 16:24  Mg     2.1     11-17    TPro  7.8  /  Alb  3.5  /  TBili  0.3  /  DBili  x   /  AST  11<L>  /  ALT  19  /  AlkPhos  71  11-17      CARDIAC MARKERS ( 17 Nov 2017 16:24 )  <.015 ng/mL / x     / 48 U/L / x     / <0.5 ng/mL        Review of Systems	      Objective     Physical Examination    obese  head at  heart - s1s2  lungs - dec BS  abd - soft  cn grossly int  moves all extr      Pertinent Lab findings & Imaging      [place]:  NO   Adequate UO     I&O's Detail           Discussed with:     Cultures:	        Radiology

## 2017-11-18 NOTE — PROGRESS NOTE ADULT - PROBLEM SELECTOR PLAN 1
-Dr. Voss consulted, appreciate recs  -Broncoscopy on Monday  -Proventil and Symbicort prn  -Geovanna and Aris arnold prn for cough  -Hold steroids and antibiotic as per pulmonary  -IVF  -[place] spray  -F/u aspergillus Ab, certromere Ab, allergy panel, lupus profile, thyroid studies  -F/u blood and urine culture

## 2017-11-18 NOTE — PROGRESS NOTE ADULT - PROBLEM SELECTOR PLAN 2
Atypical, likely 2/2 tightness from cough/asthma symptoms  -Dr. Brothers consulted, appreciate recs  -F/u cardiac enzymes

## 2017-11-19 RX ORDER — ACETAMINOPHEN 500 MG
650 TABLET ORAL ONCE
Qty: 0 | Refills: 0 | Status: COMPLETED | OUTPATIENT
Start: 2017-11-19 | End: 2017-11-19

## 2017-11-19 RX ORDER — ALBUTEROL 90 UG/1
2.5 AEROSOL, METERED ORAL EVERY 8 HOURS
Qty: 0 | Refills: 0 | Status: DISCONTINUED | OUTPATIENT
Start: 2017-11-19 | End: 2017-11-22

## 2017-11-19 RX ORDER — ACETAMINOPHEN 500 MG
650 TABLET ORAL EVERY 6 HOURS
Qty: 0 | Refills: 0 | Status: DISCONTINUED | OUTPATIENT
Start: 2017-11-19 | End: 2017-11-22

## 2017-11-19 RX ORDER — PSEUDOEPHEDRINE HCL 30 MG
30 TABLET ORAL ONCE
Qty: 0 | Refills: 0 | Status: COMPLETED | OUTPATIENT
Start: 2017-11-19 | End: 2017-11-19

## 2017-11-19 RX ORDER — ALPRAZOLAM 0.25 MG
0.5 TABLET ORAL AT BEDTIME
Qty: 0 | Refills: 0 | Status: DISCONTINUED | OUTPATIENT
Start: 2017-11-19 | End: 2017-11-22

## 2017-11-19 RX ORDER — OXYCODONE AND ACETAMINOPHEN 5; 325 MG/1; MG/1
1 TABLET ORAL EVERY 4 HOURS
Qty: 0 | Refills: 0 | Status: DISCONTINUED | OUTPATIENT
Start: 2017-11-19 | End: 2017-11-22

## 2017-11-19 RX ORDER — ALBUTEROL 90 UG/1
2.5 AEROSOL, METERED ORAL ONCE
Qty: 0 | Refills: 0 | Status: COMPLETED | OUTPATIENT
Start: 2017-11-19 | End: 2017-11-19

## 2017-11-19 RX ADMIN — PANTOPRAZOLE SODIUM 40 MILLIGRAM(S): 20 TABLET, DELAYED RELEASE ORAL at 06:21

## 2017-11-19 RX ADMIN — Medication 1 SPRAY(S): at 21:31

## 2017-11-19 RX ADMIN — MERCAPTOPURINE 75 MILLIGRAM(S): 50 TABLET ORAL at 18:30

## 2017-11-19 RX ADMIN — OXYCODONE AND ACETAMINOPHEN 1 TABLET(S): 5; 325 TABLET ORAL at 09:35

## 2017-11-19 RX ADMIN — ALBUTEROL 2.5 MILLIGRAM(S): 90 AEROSOL, METERED ORAL at 21:25

## 2017-11-19 RX ADMIN — Medication 0.5 MILLIGRAM(S): at 21:31

## 2017-11-19 RX ADMIN — OXYCODONE AND ACETAMINOPHEN 1 TABLET(S): 5; 325 TABLET ORAL at 10:15

## 2017-11-19 RX ADMIN — ALBUTEROL 2 PUFF(S): 90 AEROSOL, METERED ORAL at 07:20

## 2017-11-19 RX ADMIN — ALBUTEROL 2 PUFF(S): 90 AEROSOL, METERED ORAL at 18:26

## 2017-11-19 RX ADMIN — Medication 200 MILLIGRAM(S): at 05:28

## 2017-11-19 RX ADMIN — Medication 0.5 MILLIGRAM(S): at 08:41

## 2017-11-19 RX ADMIN — ALBUTEROL 2.5 MILLIGRAM(S): 90 AEROSOL, METERED ORAL at 15:34

## 2017-11-19 RX ADMIN — Medication 650 MILLIGRAM(S): at 05:23

## 2017-11-19 RX ADMIN — ALBUTEROL 2.5 MILLIGRAM(S): 90 AEROSOL, METERED ORAL at 07:59

## 2017-11-19 RX ADMIN — Medication 1 SPRAY(S): at 05:25

## 2017-11-19 RX ADMIN — BUDESONIDE AND FORMOTEROL FUMARATE DIHYDRATE 2 PUFF(S): 160; 4.5 AEROSOL RESPIRATORY (INHALATION) at 12:30

## 2017-11-19 RX ADMIN — BUDESONIDE AND FORMOTEROL FUMARATE DIHYDRATE 2 PUFF(S): 160; 4.5 AEROSOL RESPIRATORY (INHALATION) at 18:30

## 2017-11-19 RX ADMIN — POLYETHYLENE GLYCOL 3350 17 GRAM(S): 17 POWDER, FOR SOLUTION ORAL at 12:31

## 2017-11-19 RX ADMIN — Medication 1 SPRAY(S): at 13:50

## 2017-11-19 RX ADMIN — Medication 30 MILLIGRAM(S): at 06:19

## 2017-11-19 RX ADMIN — ALBUTEROL 2.5 MILLIGRAM(S): 90 AEROSOL, METERED ORAL at 05:42

## 2017-11-19 RX ADMIN — DULOXETINE HYDROCHLORIDE 60 MILLIGRAM(S): 30 CAPSULE, DELAYED RELEASE ORAL at 12:31

## 2017-11-19 NOTE — PROGRESS NOTE ADULT - SUBJECTIVE AND OBJECTIVE BOX
Patient is a 42y old  Female who presents with a chief complaint of SOB cough (17 Nov 2017 19:41)  still complains of cough, dypnea on exertion    INTERVAL HPI/OVERNIGHT EVENTS:  T(C): 36.6 (11-19-17 @ 13:18), Max: 36.8 (11-18-17 @ 20:54)  HR: 100 (11-19-17 @ 15:35) (70 - 104)  BP: 108/74 (11-19-17 @ 13:18) (108/74 - 125/82)  RR: 17 (11-19-17 @ 13:18) (16 - 17)  SpO2: 95% (11-19-17 @ 13:18) (94% - 96%)  Wt(kg): --  I&O's Summary    18 Nov 2017 07:01  -  19 Nov 2017 07:00  --------------------------------------------------------  IN: 360 mL / OUT: 1 mL / NET: 359 mL    19 Nov 2017 07:01  -  19 Nov 2017 17:00  --------------------------------------------------------  IN: 840 mL / OUT: 0 mL / NET: 840 mL        LABS:                        12.0   13.6  )-----------( 312      ( 18 Nov 2017 06:22 )             36.7     11-18    142  |  108  |  9   ----------------------------<  96  3.7   |  24  |  0.81    Ca    8.2<L>      18 Nov 2017 06:22            MEDICATIONS  (STANDING):  ALBUTerol    0.083% 2.5 milliGRAM(s) Nebulizer every 8 hours  ALPRAZolam 0.5 milliGRAM(s) Oral at bedtime  buDESOnide  80 MICROgram(s)/formoterol 4.5 MICROgram(s) Inhaler 2 Puff(s) Inhalation two times a day  DULoxetine 60 milliGRAM(s) Oral daily  mercaptopurine 75 milliGRAM(s) Oral daily  pantoprazole    Tablet 40 milliGRAM(s) Oral before breakfast  polyethylene glycol 3350 17 Gram(s) Oral daily  sodium chloride 0.65% Nasal 1 Spray(s) Both Nostrils three times a day    MEDICATIONS  (PRN):  acetaminophen   Tablet. 650 milliGRAM(s) Oral every 6 hours PRN Mild Pain (1 - 3)  ALBUTerol    90 MICROgram(s) HFA Inhaler 2 Puff(s) Inhalation every 6 hours PRN Shortness of Breath and/or Wheezing  ALPRAZolam 0.5 milliGRAM(s) Oral daily PRN anxiety  benzonatate 100 milliGRAM(s) Oral every 8 hours PRN Cough  guaiFENesin    Syrup 200 milliGRAM(s) Oral every 6 hours PRN Cough  oxyCODONE    5 mG/acetaminophen 325 mG 1 Tablet(s) Oral every 4 hours PRN Moderate Pain (4 - 6)      REVIEW OF SYSTEMS:  CONSTITUTIONAL: No fever, weight loss, or fatigue  EYES: No eye pain, visual disturbances, or discharge  ENMT:  No difficulty hearing, tinnitus, vertigo; No sinus or throat pain  NECK: No pain or stiffness  resp: cough and dyspnea  CARDIOVASCULAR: ant chest pressure improved  GASTROINTESTINAL: No abdominal or epigastric pain. No nausea, vomiting, or hematemesis; No diarrhea or constipation. No melena or hematochezia.  GENITOURINARY: No dysuria, frequency, hematuria, or incontinence  NEUROLOGICAL: No headaches, memory loss, loss of strength, numbness, or tremors  SKIN: No itching, burning, rashes, or lesions   LYMPH NODES: No enlarged glands  ENDOCRINE: No heat or cold intolerance; No hair loss  MUSCULOSKELETAL: No joint pain or swelling; No muscle, back, or extremity pain  PSYCHIATRIC: No depression, anxiety, mood swings, or difficulty sleeping  HEME/LYMPH: No easy bruising, or bleeding gums  ALLERY AND IMMUNOLOGIC: No hives or eczema    RADIOLOGY & ADDITIONAL TESTS:    Imaging Personally Reviewed:  [x ] YES  [ ] NO    Consultant(s) Notes Reviewed:  [x ] YES  [ ] NO    PHYSICAL EXAM:  GENERAL: NAD, well-groomed, well-developed  HEAD:  Atraumatic, Normocephalic  EYES: EOMI, PERRLA, conjunctiva and sclera clear  ENMT: No tonsillar erythema, exudates, or enlargement; Moist mucous membranes, Good dentition, No lesions  NECK: Supple, No JVD, Normal thyroid  NERVOUS SYSTEM:  Alert & Oriented X3, Good concentration; Motor Strength 5/5 B/L upper and lower extremities; DTRs 2+ intact and symmetric  CHEST/LUNG: coarse bs bl  HEART: Regular rate and rhythm; No murmurs, rubs, or gallops  ABDOMEN: Soft, Nontender, Nondistended; Bowel sounds present  EXTREMITIES:  2+ Peripheral Pulses, No clubbing, cyanosis, or edema  LYMPH: No lymphadenopathy noted  SKIN: No rashes or lesions    Care Discussed with Consultants/Other Providers [x ] YES  [ ] NO

## 2017-11-19 NOTE — PROGRESS NOTE ADULT - SUBJECTIVE AND OBJECTIVE BOX
Date/Time Patient Seen:  		  Referring MD:   Data Reviewed	       Patient is a 42y old  Female who presents with a chief complaint of SOB cough (17 Nov 2017 19:41)  c/o earache  in bed  seen and examined  vs and meds reviewed  awake  alert      Subjective/HPI     PAST MEDICAL & SURGICAL HISTORY:  GERD (gastroesophageal reflux disease)  Anxiety  Asthma  Crohn's disease  OCD (obsessive compulsive disorder)  No significant past surgical history        Medication list         MEDICATIONS  (STANDING):  ALBUTerol    0.083% 2.5 milliGRAM(s) Nebulizer every 8 hours  buDESOnide  80 MICROgram(s)/formoterol 4.5 MICROgram(s) Inhaler 2 Puff(s) Inhalation two times a day  DULoxetine 60 milliGRAM(s) Oral daily  mercaptopurine 75 milliGRAM(s) Oral daily  pantoprazole    Tablet 40 milliGRAM(s) Oral before breakfast  polyethylene glycol 3350 17 Gram(s) Oral daily  pseudoephedrine 30 milliGRAM(s) Oral once  sodium chloride 0.65% Nasal 1 Spray(s) Both Nostrils three times a day    MEDICATIONS  (PRN):  acetaminophen   Tablet. 650 milliGRAM(s) Oral every 6 hours PRN Mild Pain (1 - 3)  ALBUTerol    90 MICROgram(s) HFA Inhaler 2 Puff(s) Inhalation every 6 hours PRN Shortness of Breath and/or Wheezing  ALPRAZolam 0.5 milliGRAM(s) Oral daily PRN anxiety  benzonatate 100 milliGRAM(s) Oral every 8 hours PRN Cough  guaiFENesin    Syrup 200 milliGRAM(s) Oral every 6 hours PRN Cough         Vitals log        ICU Vital Signs Last 24 Hrs  T(C): 36.6 (19 Nov 2017 05:25), Max: 36.8 (18 Nov 2017 20:54)  T(F): 97.8 (19 Nov 2017 05:25), Max: 98.2 (18 Nov 2017 20:54)  HR: 70 (19 Nov 2017 05:43) (70 - 93)  BP: 125/82 (19 Nov 2017 05:25) (118/81 - 129/80)  BP(mean): --  ABP: --  ABP(mean): --  RR: 16 (19 Nov 2017 05:25) (16 - 17)  SpO2: 96% (19 Nov 2017 05:43) (95% - 99%)           Input and Output:  I&O's Detail    18 Nov 2017 07:01  -  19 Nov 2017 06:14  --------------------------------------------------------  IN:    Oral Fluid: 360 mL  Total IN: 360 mL    OUT:  Total OUT: 0 mL    Total NET: 360 mL          Lab Data                        12.0   13.6  )-----------( 312      ( 18 Nov 2017 06:22 )             36.7     11-18    142  |  108  |  9   ----------------------------<  96  3.7   |  24  |  0.81    Ca    8.2<L>      18 Nov 2017 06:22  Mg     2.1     11-17    TPro  7.8  /  Alb  3.5  /  TBili  0.3  /  DBili  x   /  AST  11<L>  /  ALT  19  /  AlkPhos  71  11-17      CARDIAC MARKERS ( 17 Nov 2017 16:24 )  <.015 ng/mL / x     / 48 U/L / x     / <0.5 ng/mL        Review of Systems	      Objective     Physical Examination    no external otitis stigmata  head at  heart - s1s2  lungs - clear  abd - soft  moves all extr      Pertinent Lab findings & Imaging      Erin:  NO   Adequate UO     I&O's Detail    18 Nov 2017 07:01  -  19 Nov 2017 06:14  --------------------------------------------------------  IN:    Oral Fluid: 360 mL  Total IN: 360 mL    OUT:  Total OUT: 0 mL    Total NET: 360 mL               Discussed with:     Cultures:	        Radiology

## 2017-11-19 NOTE — PROGRESS NOTE ADULT - PROBLEM SELECTOR PLAN 2
ct chest reviewed  GGO  tree in bud  Bronchoscopy in am on Monday  holding off on ABX and Steroids  monitor resp status  tylenol for earache  sudafed x 1 for upper airway congestion  bronchodilators

## 2017-11-19 NOTE — PROGRESS NOTE ADULT - PROBLEM SELECTOR PLAN 1
-Dr. Voss consulted, appreciate recs  -Broncoscopy on Monday  -Proventil and Symbicort prn  -Geovanna and Aris arnold prn for cough  -Hold steroids and antibiotic as per pulmonary  -IVF  -[place] spray  -F/u aspergillus Ab, certromere Ab, allergy panel, lupus profile, thyroid studies  -F/u blood and urine culture.    bronchiolitis on ct of chest as noted  no medical contraindications to bronchoscopy

## 2017-11-19 NOTE — PROGRESS NOTE ADULT - SUBJECTIVE AND OBJECTIVE BOX
[facility] Cardiology    CHIEF COMPLAINT: Patient is a 42y old  Female who presents with a chief complaint of SOB cough (17 Nov 2017 19:41)      Follow Up: [ ] Chest Pain      [ ] Dyspnea     [ ] Palpitations    [ ] Atrial Fibrillation     [ ] Ventricular Dysrhythmia    [ ] Abnormal EKG                      [ ] Abnormal Cardiac Enzymes     [ ] Valvular Disease    HPI:  41 y/o f pmhx anxiety, Crohn's disease on 6-MP, asthma, GERD presented with SOB since October 19. Reported drinking a large [employer] coffee in the beginning of October and noticed mold at the bottom of the cup. A few days later, developed fever 101-102, cough with productive green sputum, and chest tightness associated with the cough. The patient was seen by her PMD who rx Augmentin. Fever resolved, productive cough continued. The following week she was seen by urgent care as she was not better and sent home with steroids. The cough/SOB did not resolve and she was seen by Pulmonologist, Dr. Castaneda and given Levaquin, inhaler, and prednisone. She did not feel better and saw an allergist who took her off of all of the medications. Since she continued to have cough/SOB she returned to her pulmonologist who put her back on 60mg qd of Prednisone. Pt admitted to productive cough with green sputum, headache, stuffy nose. She also admitted to 1 episode of blood tinged sputum weeks ago which has not returned and was attributed to trauma from the cough. She  Denied sore throat, fever, chills, abd pain, n/v/d.  Reported daughter at home is sick with sinusitis. Denied recent travel.     Of note, patient recently in the ED on November 10/2017 for similar symptoms. Blood cultures negative. CT angio showed subtle bilateral micronodular groundglass centrilobular tree-in-bud opacities; which may reflect a bronchiolitis. No lobar lung consolidation or pleural effusion.     pt had ongoing cough, weakness, atypical L chest/ back pain this am, with increased anxiety, HR  S/P NEBS/Percocet/Xanax with improvement in symptoms    In the ED: /82, HR 98  WBC 18.1  CXR: The lungs are clear. The previously noted micronodular groundglass centrilobular opacities are not well seen on chest x-ray examination. The heart size is normal. The visualized osseous structures are unremarkable.  Pt received IV solumedrol and duonebs. (17 Nov 2017 18:31)      PAST MEDICAL & SURGICAL HISTORY:  GERD (gastroesophageal reflux disease)  Anxiety  Asthma  Crohn's disease  OCD (obsessive compulsive disorder)  No significant past surgical history      MEDICATIONS  (STANDING):  ALBUTerol    0.083% 2.5 milliGRAM(s) Nebulizer every 8 hours  buDESOnide  80 MICROgram(s)/formoterol 4.5 MICROgram(s) Inhaler 2 Puff(s) Inhalation two times a day  DULoxetine 60 milliGRAM(s) Oral daily  mercaptopurine 75 milliGRAM(s) Oral daily  pantoprazole    Tablet 40 milliGRAM(s) Oral before breakfast  polyethylene glycol 3350 17 Gram(s) Oral daily  sodium chloride 0.65% Nasal 1 Spray(s) Both Nostrils three times a day    MEDICATIONS  (PRN):  acetaminophen   Tablet. 650 milliGRAM(s) Oral every 6 hours PRN Mild Pain (1 - 3)  ALBUTerol    90 MICROgram(s) HFA Inhaler 2 Puff(s) Inhalation every 6 hours PRN Shortness of Breath and/or Wheezing  ALPRAZolam 0.5 milliGRAM(s) Oral daily PRN anxiety  benzonatate 100 milliGRAM(s) Oral every 8 hours PRN Cough  guaiFENesin    Syrup 200 milliGRAM(s) Oral every 6 hours PRN Cough  oxyCODONE    5 mG/acetaminophen 325 mG 1 Tablet(s) Oral every 4 hours PRN Moderate Pain (4 - 6)      Allergies    Demerol HCl (Rash)    Intolerances        REVIEW OF SYSTEMS:  CONSTITUTIONAL: No weakness, no fevers   EYES/ENT: No visual changes  NECK: No pain or stiffness  RESPIRATORY: shortness of breath  CARDIOVASCULAR: chest pain or palpitations  GASTROINTESTINAL: No abdominal pain  GENITOURINARY: No hematuria  NEUROLOGICAL: No weakness  SKIN: No rash     Vital Signs Last 24 Hrs  T(C): 36.6 (19 Nov 2017 13:18), Max: 36.8 (18 Nov 2017 20:54)  T(F): 97.8 (19 Nov 2017 13:18), Max: 98.2 (18 Nov 2017 20:54)  HR: 101 (19 Nov 2017 13:18) (70 - 101)  BP: 108/74 (19 Nov 2017 13:18) (108/74 - 125/82)  BP(mean): --  RR: 17 (19 Nov 2017 13:18) (16 - 17)  SpO2: 95% (19 Nov 2017 13:18) (94% - 96%)    I&O's Summary    18 Nov 2017 07:01  -  19 Nov 2017 07:00  --------------------------------------------------------  IN: 360 mL / OUT: 1 mL / NET: 359 mL    19 Nov 2017 07:01  -  19 Nov 2017 14:39  --------------------------------------------------------  IN: 840 mL / OUT: 0 mL / NET: 840 mL        PHYSICAL EXAM:  onstitutional: mild distress  Neurological: Alert and oriented  HEENT: EOMI, no JVD  Cardiovascular: S1 and S2, REG, no murm  Pulmonary: grossly clear  Gastrointestinal: Bowel Sounds present, soft, nontender  Ext: peripheral edema  Skin: No rashes, No cyanosis.  Psych:  Mood & affect appropriate   LABS: All Labs Reviewed:    LABS: All Labs Reviewed:                        12.0   13.6  )-----------( 312      ( 18 Nov 2017 06:22 )             36.7     11-18    142  |  108  |  9   ----------------------------<  96  3.7   |  24  |  0.81    Ca    8.2<L>      18 Nov 2017 06:22  Mg     2.1     11-17    TPro  7.8  /  Alb  3.5  /  TBili  0.3  /  DBili  x   /  AST  11<L>  /  ALT  19  /  AlkPhos  71  11-17    PT/INR - ( 17 Nov 2017 16:24 )   PT: 11.7 sec;   INR: 1.07 ratio         PTT - ( 17 Nov 2017 16:24 )  PTT:29.2 sec  CARDIAC MARKERS ( 17 Nov 2017 16:24 )  <.015 ng/mL / x     / 48 U/L / x     / <0.5 ng/mL      Blood Culture: Organism --  Gram Stain Blood -- Gram Stain --  Specimen Source .Blood Blood-Peripheral  Culture-Blood --        11-17 @ 16:24  TSH: 0.28    42F with PMH anxiety, Crohn's disease on 6-MP, asthma presents with cough/SOB since October 19.  Chronic cough. Chest pain/chest tightness/back pain, atypical.   Hx of normal CCTA last year, with Ca score 0. Echo ordered, and can be done after bronchoscopy.    RADIOLOGY/EKG: NSR, no ST-T changes    No cardiac contraindication to bronchoscopy tomorrow, Monday 11/20.

## 2017-11-20 ENCOUNTER — RESULT REVIEW (OUTPATIENT)
Age: 42
End: 2017-11-20

## 2017-11-20 ENCOUNTER — TRANSCRIPTION ENCOUNTER (OUTPATIENT)
Age: 42
End: 2017-11-20

## 2017-11-20 LAB
ANION GAP SERPL CALC-SCNC: 6 MMOL/L — SIGNIFICANT CHANGE UP (ref 5–17)
AUTO DIFF PNL BLD: NEGATIVE — SIGNIFICANT CHANGE UP
BASOPHILS # BLD AUTO: 0.1 K/UL — SIGNIFICANT CHANGE UP (ref 0–0.2)
BASOPHILS NFR BLD AUTO: 0.9 % — SIGNIFICANT CHANGE UP (ref 0–2)
BUN SERPL-MCNC: 7 MG/DL — SIGNIFICANT CHANGE UP (ref 7–23)
C-ANCA SER-ACNC: NEGATIVE — SIGNIFICANT CHANGE UP
CALCIUM SERPL-MCNC: 8.9 MG/DL — SIGNIFICANT CHANGE UP (ref 8.5–10.1)
CHLORIDE SERPL-SCNC: 104 MMOL/L — SIGNIFICANT CHANGE UP (ref 96–108)
CO2 SERPL-SCNC: 30 MMOL/L — SIGNIFICANT CHANGE UP (ref 22–31)
CREAT SERPL-MCNC: 0.86 MG/DL — SIGNIFICANT CHANGE UP (ref 0.5–1.3)
DRVVT SCREEN TO CONFIRM RATIO: SIGNIFICANT CHANGE UP
EOSINOPHIL # BLD AUTO: 0.2 K/UL — SIGNIFICANT CHANGE UP (ref 0–0.5)
EOSINOPHIL NFR BLD AUTO: 1.9 % — SIGNIFICANT CHANGE UP (ref 0–6)
GLUCOSE SERPL-MCNC: 79 MG/DL — SIGNIFICANT CHANGE UP (ref 70–99)
HCT VFR BLD CALC: 37.8 % — SIGNIFICANT CHANGE UP (ref 34.5–45)
HGB BLD-MCNC: 12.3 G/DL — SIGNIFICANT CHANGE UP (ref 11.5–15.5)
IGG SERPL-MCNC: 854 MG/DL — SIGNIFICANT CHANGE UP (ref 767–1590)
IGG1 SER-MCNC: 411 MG/DL — SIGNIFICANT CHANGE UP (ref 341–894)
IGG2 SER-MCNC: 330 MG/DL — SIGNIFICANT CHANGE UP (ref 171–632)
IGG3 SER-MCNC: 29.4 MG/DL — SIGNIFICANT CHANGE UP (ref 18.4–106)
IGG4 SER-MCNC: 53.5 MG/DL — SIGNIFICANT CHANGE UP (ref 2.4–121)
LA NT DPL PPP QL: 34.7 SEC — SIGNIFICANT CHANGE UP
LYMPHOCYTES # BLD AUTO: 1.1 K/UL — SIGNIFICANT CHANGE UP (ref 1–3.3)
LYMPHOCYTES # BLD AUTO: 13.3 % — SIGNIFICANT CHANGE UP (ref 13–44)
MCHC RBC-ENTMCNC: 29.9 PG — SIGNIFICANT CHANGE UP (ref 27–34)
MCHC RBC-ENTMCNC: 32.5 GM/DL — SIGNIFICANT CHANGE UP (ref 32–36)
MCV RBC AUTO: 92.2 FL — SIGNIFICANT CHANGE UP (ref 80–100)
MONOCYTES # BLD AUTO: 0.6 K/UL — SIGNIFICANT CHANGE UP (ref 0–0.9)
MONOCYTES NFR BLD AUTO: 6.9 % — SIGNIFICANT CHANGE UP (ref 1–9)
NEUTROPHILS # BLD AUTO: 6.6 K/UL — SIGNIFICANT CHANGE UP (ref 1.8–7.4)
NEUTROPHILS NFR BLD AUTO: 76.9 % — SIGNIFICANT CHANGE UP (ref 43–77)
NORMALIZED SCT PPP-RTO: 1.18 RATIO — HIGH (ref 0–1.16)
NORMALIZED SCT PPP-RTO: ABNORMAL
P-ANCA SER-ACNC: NEGATIVE — SIGNIFICANT CHANGE UP
PLATELET # BLD AUTO: 305 K/UL — SIGNIFICANT CHANGE UP (ref 150–400)
POTASSIUM SERPL-MCNC: 4.3 MMOL/L — SIGNIFICANT CHANGE UP (ref 3.5–5.3)
POTASSIUM SERPL-SCNC: 4.3 MMOL/L — SIGNIFICANT CHANGE UP (ref 3.5–5.3)
RAPID RVP RESULT: SIGNIFICANT CHANGE UP
RBC # BLD: 4.1 M/UL — SIGNIFICANT CHANGE UP (ref 3.8–5.2)
RBC # FLD: 14.3 % — SIGNIFICANT CHANGE UP (ref 10.3–14.5)
SODIUM SERPL-SCNC: 140 MMOL/L — SIGNIFICANT CHANGE UP (ref 135–145)
WBC # BLD: 8.6 K/UL — SIGNIFICANT CHANGE UP (ref 3.8–10.5)
WBC # FLD AUTO: 8.6 K/UL — SIGNIFICANT CHANGE UP (ref 3.8–10.5)

## 2017-11-20 PROCEDURE — 88312 SPECIAL STAINS GROUP 1: CPT | Mod: 26

## 2017-11-20 PROCEDURE — 88305 TISSUE EXAM BY PATHOLOGIST: CPT | Mod: 26

## 2017-11-20 PROCEDURE — 88108 CYTOPATH CONCENTRATE TECH: CPT | Mod: 26

## 2017-11-20 RX ORDER — ALBUTEROL 90 UG/1
2.5 AEROSOL, METERED ORAL ONCE
Qty: 0 | Refills: 0 | Status: COMPLETED | OUTPATIENT
Start: 2017-11-20 | End: 2017-11-20

## 2017-11-20 RX ORDER — LORATADINE 10 MG/1
10 TABLET ORAL DAILY
Qty: 0 | Refills: 0 | Status: DISCONTINUED | OUTPATIENT
Start: 2017-11-20 | End: 2017-11-22

## 2017-11-20 RX ADMIN — BUDESONIDE AND FORMOTEROL FUMARATE DIHYDRATE 2 PUFF(S): 160; 4.5 AEROSOL RESPIRATORY (INHALATION) at 21:40

## 2017-11-20 RX ADMIN — Medication 650 MILLIGRAM(S): at 21:42

## 2017-11-20 RX ADMIN — ALBUTEROL 2.5 MILLIGRAM(S): 90 AEROSOL, METERED ORAL at 07:45

## 2017-11-20 RX ADMIN — ALBUTEROL 2.5 MILLIGRAM(S): 90 AEROSOL, METERED ORAL at 16:03

## 2017-11-20 RX ADMIN — Medication 650 MILLIGRAM(S): at 22:12

## 2017-11-20 RX ADMIN — Medication 1 SPRAY(S): at 05:57

## 2017-11-20 RX ADMIN — LORATADINE 10 MILLIGRAM(S): 10 TABLET ORAL at 22:08

## 2017-11-20 RX ADMIN — Medication 1 SPRAY(S): at 21:40

## 2017-11-20 RX ADMIN — PANTOPRAZOLE SODIUM 40 MILLIGRAM(S): 20 TABLET, DELAYED RELEASE ORAL at 06:43

## 2017-11-20 RX ADMIN — BUDESONIDE AND FORMOTEROL FUMARATE DIHYDRATE 2 PUFF(S): 160; 4.5 AEROSOL RESPIRATORY (INHALATION) at 06:43

## 2017-11-20 RX ADMIN — DULOXETINE HYDROCHLORIDE 60 MILLIGRAM(S): 30 CAPSULE, DELAYED RELEASE ORAL at 18:49

## 2017-11-20 RX ADMIN — MERCAPTOPURINE 75 MILLIGRAM(S): 50 TABLET ORAL at 18:49

## 2017-11-20 RX ADMIN — Medication 0.5 MILLIGRAM(S): at 21:39

## 2017-11-20 NOTE — DISCHARGE NOTE ADULT - PROVIDER TOKENS
TOKEN:'[id number]',TOKEN:'[id number]' TOKEN:'[id number]:'[id number]',TOKEN:'[id number]'

## 2017-11-20 NOTE — DISCHARGE NOTE ADULT - CARE PROVIDERS DIRECT ADDRESSES
,[email or web address]_Unknown

## 2017-11-20 NOTE — DISCHARGE NOTE ADULT - MEDICATION SUMMARY - MEDICATIONS TO TAKE
I will START or STAY ON the medications listed below when I get home from the hospital:    DULoxetine 60 mg oral delayed release capsule  -- 1 cap(s) by mouth once a day  -- Indication: For OCD (obsessive compulsive disorder)    loratadine 10 mg oral tablet  -- 1 tab(s) by mouth once a day  -- Indication: For Asthma    doxycycline hyclate 100 mg oral capsule  -- 1 cap(s) by mouth every 12 hours   -- Avoid prolonged or excessive exposure to direct and/or artificial sunlight while taking this medication.  Do not take this drug if you are pregnant.  Finish all this medication unless otherwise directed by prescriber.  Medication should be taken with plenty of water.    -- Indication: For Lower respiratory tract finding    mercaptopurine 50 mg oral tablet  -- 1.5 tab(s) by mouth once a day  -- Indication: For Crohn's disease    benzonatate 100 mg oral capsule  -- 1 cap(s) by mouth every 8 hours, As needed, Cough  -- Indication: For Lower respiratory tract finding    ALPRAZolam 0.5 mg oral tablet  -- 1 tab(s) by mouth every 8 hours, As Needed MDD:3  -- Avoid grapefruit and grapefruit juice while taking this medication.  Caution federal law prohibits the transfer of this drug to any person other  than the person for whom it was prescribed.  Do not take this drug if you are pregnant.  May cause drowsiness.  Alcohol may intensify this effect.  Use care when operating dangerous machinery.    -- Indication: For Anxiety    albuterol 90 mcg/inh inhalation powder  -- 2 puff(s) inhaled every 6 hours, As Needed   -- For inhalation only.  It is very important that you take or use this exactly as directed.  Do not skip doses or discontinue unless directed by your doctor.  Obtain medical advice before taking any non-prescription drugs as some may affect the action of this medication.    -- Indication: For Asthma    budesonide-formoterol 80 mcg-4.5 mcg/inh inhalation aerosol  -- 2 puff(s) inhaled 2 times a day  -- Indication: For Asthma    MiraLax oral powder for reconstitution  -- 1 dose(s) by mouth once a day  -- Indication: For Crohn's disease    omeprazole 40 mg oral delayed release capsule  -- 1 cap(s) by mouth 2 times a day (before meals)  -- Indication: For GERD (gastroesophageal reflux disease)

## 2017-11-20 NOTE — DISCHARGE NOTE ADULT - SECONDARY DIAGNOSIS.
Asthma Anxiety Crohn's disease GERD (gastroesophageal reflux disease) OCD (obsessive compulsive disorder)

## 2017-11-20 NOTE — PROGRESS NOTE ADULT - SUBJECTIVE AND OBJECTIVE BOX
[facility] Cardiology Progress Note [phone] (Dr. Dominguez, Parul, Tanisha, Yash)    CHIEF COMPLAINT: Patient is a 42y old  Female who presents with a chief complaint of SOB cough (17 Nov 2017 19:41)      Follow Up Today: The patient denies any chest discomfort or shortness of breath.    HPI:  43 y/o f pmhx anxiety, Crohn's disease on 6-MP, asthma, GERD presented with SOB since October 19. Reported drinking a large [employer] coffee in the beginning of October and noticed mold at the bottom of the cup. A few days later, developed fever 101-102, cough with productive green sputum, and chest tightness associated with the cough. The patient was seen by her PMD who rx Augmentin. Fever resolved, productive cough continued. The following week she was seen by urgent care as she was not better and sent home with steroids. The cough/SOB did not resolve and she was seen by Pulmonologist, Dr. Castaneda and given Levaquin, inhaler, and prednisone. She did not feel better and saw an allergist who took her off of all of the medications. Since she continued to have cough/SOB she returned to her pulmonologist who put her back on 60mg qd of Prednisone. Pt admitted to productive cough with green sputum, headache, stuffy nose. She also admitted to 1 episode of blood tinged sputum weeks ago which has not returned and was attributed to trauma from the cough. She  Denied sore throat, fever, chills, abd pain, n/v/d.  Reported daughter at home is sick with sinusitis. Denied recent travel.     Of note, patient recently in the ED on November 10/2017 for similar symptoms. Blood cultures negative. CT angio showed subtle bilateral micronodular groundglass centrilobular tree-in-bud opacities; which may reflect a bronchiolitis. No lobar lung consolidation or pleural effusion.     In the ED: /82, HR 98  WBC 18.1  CXR: The lungs are clear. The previously noted micronodular groundglass centrilobular opacities are not well seen on chest x-ray examination. The heart size is normal. The visualized osseous structures are unremarkable.  Pt received IV solumedrol and duonebs. (17 Nov 2017 18:31)      PAST MEDICAL & SURGICAL HISTORY:  GERD (gastroesophageal reflux disease)  Anxiety  Asthma  Crohn's disease  OCD (obsessive compulsive disorder)  No significant past surgical history      MEDICATIONS  (STANDING):  ALBUTerol    0.083% 2.5 milliGRAM(s) Nebulizer every 8 hours  ALPRAZolam 0.5 milliGRAM(s) Oral at bedtime  buDESOnide  80 MICROgram(s)/formoterol 4.5 MICROgram(s) Inhaler 2 Puff(s) Inhalation two times a day  DULoxetine 60 milliGRAM(s) Oral daily  mercaptopurine 75 milliGRAM(s) Oral daily  pantoprazole    Tablet 40 milliGRAM(s) Oral before breakfast  polyethylene glycol 3350 17 Gram(s) Oral daily  sodium chloride 0.65% Nasal 1 Spray(s) Both Nostrils three times a day    MEDICATIONS  (PRN):  acetaminophen   Tablet. 650 milliGRAM(s) Oral every 6 hours PRN Mild Pain (1 - 3)  ALBUTerol    90 MICROgram(s) HFA Inhaler 2 Puff(s) Inhalation every 6 hours PRN Shortness of Breath and/or Wheezing  ALPRAZolam 0.5 milliGRAM(s) Oral daily PRN anxiety  benzonatate 100 milliGRAM(s) Oral every 8 hours PRN Cough  guaiFENesin    Syrup 200 milliGRAM(s) Oral every 6 hours PRN Cough  oxyCODONE    5 mG/acetaminophen 325 mG 1 Tablet(s) Oral every 4 hours PRN Moderate Pain (4 - 6)      Allergies    Demerol HCl (Rash)    Intolerances        REVIEW OF SYSTEMS:    All other review of systems is negative unless indicated above    Vital Signs Last 24 Hrs  T(C): 36.7 (20 Nov 2017 05:57), Max: 36.7 (20 Nov 2017 05:57)  T(F): 98.1 (20 Nov 2017 05:57), Max: 98.1 (20 Nov 2017 05:57)  HR: 104 (20 Nov 2017 07:45) (88 - 105)  BP: 125/- (20 Nov 2017 05:57) (108/74 - 125/-)  BP(mean): --  RR: 16 (20 Nov 2017 05:57) (16 - 17)  SpO2: 94% (20 Nov 2017 07:45) (94% - 97%)    I&O's Summary    19 Nov 2017 07:01  -  20 Nov 2017 07:00  --------------------------------------------------------  IN: 1200 mL / OUT: 0 mL / NET: 1200 mL        PHYSICAL EXAM:    Constitutional: mild distress  Neurological: Alert and oriented  HEENT: EOMI, no JVD  Cardiovascular: S1 and S2, REG, no murm  Pulmonary: grossly clear  Gastrointestinal: Bowel Sounds present, soft, nontender  Ext: peripheral edema  Skin: No rashes, No cyanosis.  Psych:  Mood & affect appropriate     LABS: All Labs Reviewed:                        12.3   8.6   )-----------( 305      ( 20 Nov 2017 06:37 )             37.8                         12.0   13.6  )-----------( 312      ( 18 Nov 2017 06:22 )             36.7                         13.5   18.1  )-----------( 353      ( 17 Nov 2017 16:24 )             40.6     20 Nov 2017 07:15    140    |  104    |  7      ----------------------------<  79     4.3     |  30     |  0.86   18 Nov 2017 06:22    142    |  108    |  9      ----------------------------<  96     3.7     |  24     |  0.81   17 Nov 2017 16:24    141    |  107    |  10     ----------------------------<  110    3.8     |  24     |  0.92     Ca    8.9        20 Nov 2017 07:15  Ca    8.2        18 Nov 2017 06:22  Ca    8.5        17 Nov 2017 16:24  Mg     2.1       17 Nov 2017 16:24    TPro  7.8    /  Alb  3.5    /  TBili  0.3    /  DBili  x      /  AST  11     /  ALT  19     /  AlkPhos  71     17 Nov 2017 16:24          Blood Culture: Organism --  Gram Stain Blood -- Gram Stain --  Specimen Source .Blood Blood-Peripheral  Culture-Blood --        11-17 @ 16:24  TSH: 0.28      RADIOLOGY/EKG:    Attending Attestation:   20 minutes spent on total encounter; more than 50% of the visit was spent counseling and/or coordinating care by the attending physician.     ASSESSMENT AND PLAN

## 2017-11-20 NOTE — PROGRESS NOTE ADULT - PROBLEM SELECTOR PLAN 2
Atypical, likely 2/2 tightness from cough/asthma symptoms  -Dr. Brothers consulted, appreciate recs  -echo pending Atypical, likely 2/2 tightness from cough/asthma symptoms  -Dr. Dominguez consulted, appreciate recs  -echo pending, to be performed after bronch

## 2017-11-20 NOTE — DISCHARGE NOTE ADULT - CARE PROVIDER_API CALL
Hoang Snider), Internal Medicine  [address]  [address]  [place] [address]  Phone: [phone]  Fax: [phone]    Ronal Amaro (), Internal Medicine; Pulmonary Disease; Sleep Medicine  [address]  [place] [address]  Phone: [phone]  Fax: [phone] Hoang Snider), Internal Medicine  [address]  [address]  [place] [address]  Phone: [phone]  Fax: [phone]    Ronal Amaro (), Internal Medicine; Pulmonary Disease; Sleep Medicine  [address]  [place] [address]  Phone: [phone]  Fax: [phone]    Morales Garrido), Infectious Disease; Internal Medicine  [address]  [address]  [place] [address]  Phone: [phone]  Fax: [phone]

## 2017-11-20 NOTE — DISCHARGE NOTE ADULT - PATIENT PORTAL LINK FT
“You can access the Follow[facility] Patient Portal, offered by [facility], by registering with the following website: [email or web address]”

## 2017-11-20 NOTE — DISCHARGE NOTE ADULT - HOSPITAL COURSE
41 y/o f pmhx anxiety, Crohn's disease on 6-MP, asthma, GERD presented with SOB since October 19. Reported drinking a large [employer] coffee in the beginning of October and noticed mold at the bottom of the cup. A few days later, developed fever 101-102, cough with productive green sputum, and chest tightness associated with the cough. The patient was seen by her PMD who rx Augmentin. Fever resolved, productive cough continued. The following week she was seen by urgent care as she was not better and sent home with steroids. The cough/SOB did not resolve and she was seen by Pulmonologist, Dr. Castaneda and given Levaquin, inhaler, and prednisone. She did not feel better and saw an allergist who took her off of all of the medications. Since she continued to have cough/SOB she returned to her pulmonologist who put her back on 60mg qd of Prednisone. Pt admitted to productive cough with green sputum, headache, stuffy nose. She also admitted to 1 episode of blood tinged sputum weeks ago which has not returned and was attributed to trauma from the cough. She  Denied sore throat, fever, chills, abd pain, n/v/d.  Reported daughter at home is sick with sinusitis. Denied recent travel.     Of note, patient recently in the ED on November 10/2017 for similar symptoms. Blood cultures negative. CT angio showed subtle bilateral micronodular groundglass centrilobular tree-in-bud opacities; which may reflect a bronchiolitis. No lobar lung consolidation or pleural effusion.     In the ED: /82, HR 98  WBC 18.1  CXR: The lungs are clear. The previously noted micronodular groundglass centrilobular opacities are not well seen on chest x-ray examination. The heart size is normal. The visualized osseous structures are unremarkable.  Pt received IV solumedrol and duonebs. 41 y/o f pmhx anxiety, Crohn's disease on 6-MP, asthma, GERD presented with SOB since October 19. Reported drinking a large [employer] coffee in the beginning of October and noticed mold at the bottom of the cup. A few days later, developed fever 101-102, cough with productive green sputum, and chest tightness associated with the cough. The patient was seen by her PMD who rx Augmentin. Fever resolved, productive cough continued. The following week she was seen by urgent care as she was not better and sent home with steroids. The cough/SOB did not resolve and she was seen by Pulmonologist, Dr. Castaneda and given Levaquin, inhaler, and prednisone. She did not feel better and saw an allergist who took her off of all of the medications. Since she continued to have cough/SOB she returned to her pulmonologist who put her back on 60mg qd of Prednisone. Pt admitted to productive cough with green sputum, headache, stuffy nose. She also admitted to 1 episode of blood tinged sputum weeks ago which has not returned and was attributed to trauma from the cough. She  Denied sore throat, fever, chills, abd pain, n/v/d.  Reported daughter at home is sick with sinusitis. Denied recent travel.     Of note, patient recently in the ED on November 10/2017 for similar symptoms. Blood cultures negative. CT angio showed subtle bilateral micronodular groundglass centrilobular tree-in-bud opacities; which may reflect a bronchiolitis. No lobar lung consolidation or pleural effusion.     In the ED: /82, HR 98, WBC 18.1 CXR: The lungs are clear. The previously noted micronodular groundglass centrilobular opacities are not well seen on chest x-ray examination. The heart size is normal. The visualized osseous structures are unremarkable.  Pt received IV solumedrol and duonebs.    Patient was admitted to general medical floor, and Dr. Voss, pulmonology was consulted, and recommended bronchoscopy.  Cardiology, Dr. Barry was consulted for patient's complaint of chest pain.  Cardiac enzymes were negative.  Bronchiolitis was revealed on chest CT, however, antibiotics were held pending bronchoscopy.  Mycoplasma pneumonia was suspected given patient immunocompromised status, and diffuse infiltrate on chest xray.  IgG and IgM studies for mycoplasma were ordered. 41 y/o f pmhx anxiety, Crohn's disease on 6-MP, asthma, GERD presented with SOB since October 19. Reported drinking a large [employer] coffee in the beginning of October and noticed mold at the bottom of the cup. A few days later, developed fever 101-102, cough with productive green sputum, and chest tightness associated with the cough. The patient was seen by her PMD who rx Augmentin. Fever resolved, productive cough continued. The following week she was seen by urgent care as she was not better and sent home with steroids. The cough/SOB did not resolve and she was seen by Pulmonologist, Dr. Castaneda and given Levaquin, inhaler, and prednisone. She did not feel better and saw an allergist who took her off of all of the medications. Since she continued to have cough/SOB she returned to her pulmonologist who put her back on 60mg qd of Prednisone. Pt admitted to productive cough with green sputum, headache, stuffy nose. She also admitted to 1 episode of blood tinged sputum weeks ago which has not returned and was attributed to trauma from the cough. She  Denied sore throat, fever, chills, abd pain, n/v/d.  Reported daughter at home is sick with sinusitis. Denied recent travel.     Of note, patient recently in the ED on November 10/2017 for similar symptoms. Blood cultures negative. CT angio showed subtle bilateral micronodular groundglass centrilobular tree-in-bud opacities; which may reflect a bronchiolitis. No lobar lung consolidation or pleural effusion.     In the ED: /82, HR 98, WBC 18.1 CXR: The lungs are clear. The previously noted micronodular groundglass centrilobular opacities are not well seen on chest x-ray examination. The heart size is normal. The visualized osseous structures are unremarkable.  Pt received IV solumedrol and duonebs.    Patient was admitted to general medical floor, and Dr. Voss, pulmonology was consulted, and recommended bronchoscopy.  Cardiology, Dr. Barry was consulted for patient's complaint of chest pain.  Cardiac enzymes were negative.  Bronchiolitis was revealed on chest CT, however, antibiotics were held pending bronchoscopy.  Mycoplasma pneumonia was suspected given patient immunocompromised status, and diffuse infiltrate on chest xray.  IgG and IgM studies for mycoplasma were ordered.  Patient continued to improve.  Mycoplasma iGG was positive and patient was cleared by Dr. Garrido for outpatient course of doxycycline 100mg bid for seven days to end on 11/22/2017.      We recommend that she follow up with her PMD w/ in 1 week of discharge. 43 y/o f pmhx anxiety, Crohn's disease on 6-MP, asthma, GERD presented with SOB since October 19. Reported drinking a large [employer] coffee in the beginning of October and noticed mold at the bottom of the cup. A few days later, developed fever 101-102, cough with productive green sputum, and chest tightness associated with the cough. The patient was seen by her PMD who rx Augmentin. Fever resolved, productive cough continued. The following week she was seen by urgent care as she was not better and sent home with steroids. The cough/SOB did not resolve and she was seen by Pulmonologist, Dr. Castaneda and given Levaquin, inhaler, and prednisone. She did not feel better and saw an allergist who took her off of all of the medications. Since she continued to have cough/SOB she returned to her pulmonologist who put her back on 60mg qd of Prednisone. Pt admitted to productive cough with green sputum, headache, stuffy nose. She also admitted to 1 episode of blood tinged sputum weeks ago which has not returned and was attributed to trauma from the cough. She  Denied sore throat, fever, chills, abd pain, n/v/d.  Reported daughter at home is sick with sinusitis. Denied recent travel.     Of note, patient recently in the ED on November 10/2017 for similar symptoms. Blood cultures negative. CT angio showed subtle bilateral micronodular groundglass centrilobular tree-in-bud opacities; which may reflect a bronchiolitis. No lobar lung consolidation or pleural effusion.     In the ED: /82, HR 98, WBC 18.1 CXR: The lungs are clear. The previously noted micronodular groundglass centrilobular opacities are not well seen on chest x-ray examination. The heart size is normal. The visualized osseous structures are unremarkable.  Pt received IV solumedrol and duonebs.    Patient was admitted to general medical floor, and Dr. Voss, pulmonology was consulted, and recommended bronchoscopy.  Cardiology, Dr. Barry was consulted for patient's complaint of chest pain.  Cardiac enzymes were negative.  Bronchiolitis was revealed on chest CT, however, antibiotics were held pending bronchoscopy.  Mycoplasma pneumonia was suspected given patient immunocompromised status, and diffuse infiltrate on chest xray.  IgG and IgM studies for mycoplasma were ordered.  Patient continued to improve. patient was seen and  cleared by Dr. Garrido for outpatient course of doxycycline 100mg bid for seven days to end on 11/22/2017.      We recommend that she follow up with her PMD and pulm  w/ in 1 week of discharge.

## 2017-11-20 NOTE — DISCHARGE NOTE ADULT - CARE PLAN
Principal Discharge DX:	Lower respiratory tract finding  Goal:	resolution  Instructions for follow-up, activity and diet:	You were diagnosed with a respiratory tract infection and admitted for a bronchoscopy.  Your cultures were positive for mycoplasma pneumonia, which is an atypical pneumonia.    You are being started on an antibiotic, doxycycline 100mg every 12 hours for 7 days, to end on 11/29  - follow up with your primary care doctor w/ in one week  Secondary Diagnosis:	Asthma  Instructions for follow-up, activity and diet:	continue your current inhaler usage  - follow up with your primary care doctor w/ in one week  Secondary Diagnosis:	Anxiety  Instructions for follow-up, activity and diet:	- continue home medications  - follow up with your primary care doctor w/ in one week  Secondary Diagnosis:	Crohn's disease  Instructions for follow-up, activity and diet:	- continue home medications.  - follow up with your primary care doctor w/ in one week  Secondary Diagnosis:	GERD (gastroesophageal reflux disease)  Instructions for follow-up, activity and diet:	- continue home medications.  - follow up with your primary care doctor w/ in one week  Secondary Diagnosis:	OCD (obsessive compulsive disorder)  Instructions for follow-up, activity and diet:	- continue home medications  - follow up with your primary care doctor w/ in one week Principal Discharge DX:	Lower respiratory tract finding  Goal:	resolution  Instructions for follow-up, activity and diet:	You were diagnosed with a respiratory tract infection and admitted for a bronchoscopy.  You are being started on an antibiotic, doxycycline 100mg every 12 hours for 7 days, to end on 11/29  see pulm and pmd one week  Secondary Diagnosis:	Asthma  Instructions for follow-up, activity and diet:	continue your current inhaler usage  pulmonary follow up out pt  Secondary Diagnosis:	Anxiety  Instructions for follow-up, activity and diet:	- continue home medications  - follow up with your primary care doctor w/ in one week  Secondary Diagnosis:	Crohn's disease  Instructions for follow-up, activity and diet:	- continue home medications.  gi follow up  Secondary Diagnosis:	GERD (gastroesophageal reflux disease)  Instructions for follow-up, activity and diet:	- continue home medications.  - follow up with your primary care doctor w/ in one week  Secondary Diagnosis:	OCD (obsessive compulsive disorder)  Instructions for follow-up, activity and diet:	- continue home medications  - follow up with your primary care doctor w/ in one week

## 2017-11-20 NOTE — PROGRESS NOTE ADULT - SUBJECTIVE AND OBJECTIVE BOX
Date/Time Patient Seen:  		  Referring MD:   Data Reviewed	       Patient is a 42y old  Female who presents with a chief complaint of SOB cough (17 Nov 2017 19:41)    for bronchoscopy today  vs and meds reviewed  cardio eval noted      Subjective/HPI     PAST MEDICAL & SURGICAL HISTORY:  GERD (gastroesophageal reflux disease)  Anxiety  Asthma  Crohn's disease  OCD (obsessive compulsive disorder)  No significant past surgical history        Medication list         MEDICATIONS  (STANDING):  ALBUTerol    0.083% 2.5 milliGRAM(s) Nebulizer every 8 hours  ALPRAZolam 0.5 milliGRAM(s) Oral at bedtime  buDESOnide  80 MICROgram(s)/formoterol 4.5 MICROgram(s) Inhaler 2 Puff(s) Inhalation two times a day  DULoxetine 60 milliGRAM(s) Oral daily  mercaptopurine 75 milliGRAM(s) Oral daily  pantoprazole    Tablet 40 milliGRAM(s) Oral before breakfast  polyethylene glycol 3350 17 Gram(s) Oral daily  sodium chloride 0.65% Nasal 1 Spray(s) Both Nostrils three times a day    MEDICATIONS  (PRN):  acetaminophen   Tablet. 650 milliGRAM(s) Oral every 6 hours PRN Mild Pain (1 - 3)  ALBUTerol    90 MICROgram(s) HFA Inhaler 2 Puff(s) Inhalation every 6 hours PRN Shortness of Breath and/or Wheezing  ALPRAZolam 0.5 milliGRAM(s) Oral daily PRN anxiety  benzonatate 100 milliGRAM(s) Oral every 8 hours PRN Cough  guaiFENesin    Syrup 200 milliGRAM(s) Oral every 6 hours PRN Cough  oxyCODONE    5 mG/acetaminophen 325 mG 1 Tablet(s) Oral every 4 hours PRN Moderate Pain (4 - 6)         Vitals log        ICU Vital Signs Last 24 Hrs  T(C): 36.7 (20 Nov 2017 05:57), Max: 36.7 (20 Nov 2017 05:57)  T(F): 98.1 (20 Nov 2017 05:57), Max: 98.1 (20 Nov 2017 05:57)  HR: 94 (20 Nov 2017 05:57) (76 - 104)  BP: 125/- (20 Nov 2017 05:57) (108/74 - 125/-)  BP(mean): --  ABP: --  ABP(mean): --  RR: 16 (20 Nov 2017 05:57) (16 - 17)  SpO2: 96% (20 Nov 2017 05:57) (94% - 97%)           Input and Output:  I&O's Detail    18 Nov 2017 07:01  -  19 Nov 2017 07:00  --------------------------------------------------------  IN:    Oral Fluid: 360 mL  Total IN: 360 mL    OUT:    Voided: 1 mL  Total OUT: 1 mL    Total NET: 359 mL      19 Nov 2017 07:01  -  20 Nov 2017 06:37  --------------------------------------------------------  IN:    Oral Fluid: 1200 mL  Total IN: 1200 mL    OUT:  Total OUT: 0 mL    Total NET: 1200 mL          Lab Data                  Review of Systems	      Objective     Physical Examination    head at  heart - s1s2  lungs - dec BS  abd - soft  obese  cn grossly int      Pertinent Lab findings & Imaging      Erin:  NO   Adequate UO     I&O's Detail    18 Nov 2017 07:01  -  19 Nov 2017 07:00  --------------------------------------------------------  IN:    Oral Fluid: 360 mL  Total IN: 360 mL    OUT:    Voided: 1 mL  Total OUT: 1 mL    Total NET: 359 mL      19 Nov 2017 07:01  -  20 Nov 2017 06:37  --------------------------------------------------------  IN:    Oral Fluid: 1200 mL  Total IN: 1200 mL    OUT:  Total OUT: 0 mL    Total NET: 1200 mL               Discussed with:     Cultures:	        Radiology

## 2017-11-20 NOTE — DISCHARGE NOTE ADULT - PLAN OF CARE
resolution You were diagnosed with a respiratory tract infection and admitted for a bronchoscopy.  Your cultures were positive for mycoplasma pneumonia, which is an atypical pneumonia.    You are being started on an antibiotic, doxycycline 100mg every 12 hours for 7 days, to end on 11/29  - follow up with your primary care doctor w/ in one week continue your current inhaler usage  - follow up with your primary care doctor w/ in one week - continue home medications  - follow up with your primary care doctor w/ in one week - continue home medications.  - follow up with your primary care doctor w/ in one week You were diagnosed with a respiratory tract infection and admitted for a bronchoscopy.  You are being started on an antibiotic, doxycycline 100mg every 12 hours for 7 days, to end on 11/29  see pulm and pmd one week continue your current inhaler usage  pulmonary follow up out pt - continue home medications.  gi follow up

## 2017-11-20 NOTE — PROGRESS NOTE ADULT - PROBLEM SELECTOR PLAN 2
ct chest reviewed  for Bronch today  GGO and tree in bud  completed courses of ABX  cont symbicort, bronchodilators, cough regimen, supportive Rx regimen and care  keep sat > 88 pct  ambulate as tolerated  markers pending

## 2017-11-20 NOTE — PROGRESS NOTE ADULT - SUBJECTIVE AND OBJECTIVE BOX
HPI:  41 y/o f pmhx anxiety, Crohn's disease on 6-MP, asthma, GERD presented with SOB since October 19.     Patient evaluated at bedside.  She reports sinus congestion, ear pain, SOB worse with exertion, and that she has a productive cough with green sputum.  She denies fevers, chills, n/v/d/c, changes in urinary frequency or pain with urination.    REVIEW OF SYSTEMS:    CONSTITUTIONAL: No weakness, fevers or chills  EYES/ENT: + nasal congestion, ear pain; No visual changes, no throat pain   RESPIRATORY: + cough, SOB; No wheezing, hemoptysis;  CARDIOVASCULAR: No chest pain or palpitations  GASTROINTESTINAL: decreased appetite. No abdominal pain, nausea, vomiting, or hematemesis; No diarrhea or constipation. No melena or hematochezia.  GENITOURINARY: No dysuria, frequency or hematuria  NEUROLOGICAL: No dizziness, numbness, or weakness  SKIN: No itching, burning, rashes, or lesions   All other review of systems is negative unless indicated above.    VITAL SIGNS:  Vital Signs Last 24 Hrs  T(C): 36.7 (20 Nov 2017 05:57), Max: 36.7 (20 Nov 2017 05:57)  T(F): 98.1 (20 Nov 2017 05:57), Max: 98.1 (20 Nov 2017 05:57)  HR: 104 (20 Nov 2017 07:45) (88 - 105)  BP: 125/- (20 Nov 2017 05:57) (108/74 - 125/-)  RR: 16 (20 Nov 2017 05:57) (16 - 17)  SpO2: 94% (20 Nov 2017 07:45) (94% - 97%)      PHYSICAL EXAM:     GENERAL: no acute distress  HEENT: NC/AT, EOMI, neck supple, MMM; sinuses nontender; TM no erythema, anterior cone of light reflex intact.  RESPIRATORY: LCTAB/L, no rhonchi, rales, or wheezing  CARDIOVASCULAR: RRR, no murmurs, gallops, rubs  ABDOMINAL: soft, non-tender, non-distended, positive bowel sounds   EXTREMITIES: no clubbing, cyanosis, or edema  NEUROLOGICAL: alert and oriented x 3, non-focal  SKIN: no rashes or lesions   MUSCULOSKELETAL: no gross joint deformity                          12.3   8.6   )-----------( 305      ( 20 Nov 2017 06:37 )             37.8     11-20    140  |  104  |  7   ----------------------------<  79  4.3   |  30  |  0.86    Ca    8.9      20 Nov 2017 07:15        CAPILLARY BLOOD GLUCOSE          MEDICATIONS  (STANDING):  ALBUTerol    0.083% 2.5 milliGRAM(s) Nebulizer every 8 hours  ALPRAZolam 0.5 milliGRAM(s) Oral at bedtime  buDESOnide  80 MICROgram(s)/formoterol 4.5 MICROgram(s) Inhaler 2 Puff(s) Inhalation two times a day  DULoxetine 60 milliGRAM(s) Oral daily  mercaptopurine 75 milliGRAM(s) Oral daily  pantoprazole    Tablet 40 milliGRAM(s) Oral before breakfast  polyethylene glycol 3350 17 Gram(s) Oral daily  sodium chloride 0.65% Nasal 1 Spray(s) Both Nostrils three times a day

## 2017-11-20 NOTE — DISCHARGE NOTE ADULT - MEDICATION SUMMARY - MEDICATIONS TO STOP TAKING
I will STOP taking the medications listed below when I get home from the hospital:    predniSONE  -- 60 milligram(s) by mouth once a day    predniSONE 20 mg oral tablet  -- 3 tab(s) by mouth once a day    levalbuterol 45 mcg/inh inhalation aerosol  -- 2 puff(s) inhaled every 4 hours, As Needed

## 2017-11-20 NOTE — DISCHARGE NOTE ADULT - INSTRUCTIONS
patient is cleared to resume normal activity as tolerated.  You may notice fatigue as you continue to recover from your respiratory infection

## 2017-11-21 LAB
A FLAVUS AB FLD QL: NEGATIVE — SIGNIFICANT CHANGE UP
A NIGER AB FLD QL: NEGATIVE — SIGNIFICANT CHANGE UP
A NIGER AB FLD QL: NEGATIVE — SIGNIFICANT CHANGE UP
GRAM STN FLD: SIGNIFICANT CHANGE UP
HP PNL SER: SIGNIFICANT CHANGE UP
NIGHT BLUE STAIN TISS: SIGNIFICANT CHANGE UP
SPECIMEN SOURCE: SIGNIFICANT CHANGE UP
SPECIMEN SOURCE: SIGNIFICANT CHANGE UP

## 2017-11-21 RX ORDER — PSEUDOEPHEDRINE HCL 30 MG
15 TABLET ORAL THREE TIMES A DAY
Qty: 0 | Refills: 0 | Status: DISCONTINUED | OUTPATIENT
Start: 2017-11-21 | End: 2017-11-22

## 2017-11-21 RX ORDER — DULOXETINE HYDROCHLORIDE 30 MG/1
1 CAPSULE, DELAYED RELEASE ORAL
Qty: 0 | Refills: 0 | COMMUNITY

## 2017-11-21 RX ORDER — DULOXETINE HYDROCHLORIDE 30 MG/1
1 CAPSULE, DELAYED RELEASE ORAL
Qty: 0 | Refills: 0 | DISCHARGE
Start: 2017-11-21

## 2017-11-21 RX ORDER — PSEUDOEPHEDRINE HCL 30 MG
15 TABLET ORAL THREE TIMES A DAY
Qty: 0 | Refills: 0 | Status: DISCONTINUED | OUTPATIENT
Start: 2017-11-21 | End: 2017-11-21

## 2017-11-21 RX ADMIN — PANTOPRAZOLE SODIUM 40 MILLIGRAM(S): 20 TABLET, DELAYED RELEASE ORAL at 05:50

## 2017-11-21 RX ADMIN — BUDESONIDE AND FORMOTEROL FUMARATE DIHYDRATE 2 PUFF(S): 160; 4.5 AEROSOL RESPIRATORY (INHALATION) at 18:44

## 2017-11-21 RX ADMIN — LORATADINE 10 MILLIGRAM(S): 10 TABLET ORAL at 12:02

## 2017-11-21 RX ADMIN — ALBUTEROL 2.5 MILLIGRAM(S): 90 AEROSOL, METERED ORAL at 07:32

## 2017-11-21 RX ADMIN — Medication 1 SPRAY(S): at 13:46

## 2017-11-21 RX ADMIN — Medication 1 SPRAY(S): at 05:51

## 2017-11-21 RX ADMIN — MERCAPTOPURINE 75 MILLIGRAM(S): 50 TABLET ORAL at 13:43

## 2017-11-21 RX ADMIN — POLYETHYLENE GLYCOL 3350 17 GRAM(S): 17 POWDER, FOR SOLUTION ORAL at 12:02

## 2017-11-21 RX ADMIN — Medication 15 MILLIGRAM(S): at 21:24

## 2017-11-21 RX ADMIN — Medication 0.5 MILLIGRAM(S): at 21:24

## 2017-11-21 RX ADMIN — BUDESONIDE AND FORMOTEROL FUMARATE DIHYDRATE 2 PUFF(S): 160; 4.5 AEROSOL RESPIRATORY (INHALATION) at 05:51

## 2017-11-21 RX ADMIN — Medication 15 MILLIGRAM(S): at 13:46

## 2017-11-21 RX ADMIN — Medication 1 SPRAY(S): at 21:23

## 2017-11-21 RX ADMIN — ALBUTEROL 2.5 MILLIGRAM(S): 90 AEROSOL, METERED ORAL at 15:53

## 2017-11-21 RX ADMIN — DULOXETINE HYDROCHLORIDE 60 MILLIGRAM(S): 30 CAPSULE, DELAYED RELEASE ORAL at 12:02

## 2017-11-21 NOTE — PROGRESS NOTE ADULT - SUBJECTIVE AND OBJECTIVE BOX
Date/Time Patient Seen:  		  Referring MD:   Data Reviewed	       Patient is a 42y old  Female who presents with a chief complaint of SOB cough (20 Nov 2017 15:00)  in bed  seen and examined  post bronchoscopy      Subjective/HPI     PAST MEDICAL & SURGICAL HISTORY:  GERD (gastroesophageal reflux disease)  Anxiety  Asthma  Crohn's disease  OCD (obsessive compulsive disorder)  No significant past surgical history        Medication list         MEDICATIONS  (STANDING):  ALBUTerol    0.083% 2.5 milliGRAM(s) Nebulizer every 8 hours  ALPRAZolam 0.5 milliGRAM(s) Oral at bedtime  buDESOnide  80 MICROgram(s)/formoterol 4.5 MICROgram(s) Inhaler 2 Puff(s) Inhalation two times a day  DULoxetine 60 milliGRAM(s) Oral daily  loratadine 10 milliGRAM(s) Oral daily  mercaptopurine 75 milliGRAM(s) Oral daily  pantoprazole    Tablet 40 milliGRAM(s) Oral before breakfast  polyethylene glycol 3350 17 Gram(s) Oral daily  sodium chloride 0.65% Nasal 1 Spray(s) Both Nostrils three times a day    MEDICATIONS  (PRN):  acetaminophen   Tablet. 650 milliGRAM(s) Oral every 6 hours PRN Mild Pain (1 - 3)  ALBUTerol    90 MICROgram(s) HFA Inhaler 2 Puff(s) Inhalation every 6 hours PRN Shortness of Breath and/or Wheezing  ALPRAZolam 0.5 milliGRAM(s) Oral daily PRN anxiety  benzonatate 100 milliGRAM(s) Oral every 8 hours PRN Cough  guaiFENesin    Syrup 200 milliGRAM(s) Oral every 6 hours PRN Cough  oxyCODONE    5 mG/acetaminophen 325 mG 1 Tablet(s) Oral every 4 hours PRN Moderate Pain (4 - 6)         Vitals log        ICU Vital Signs Last 24 Hrs  T(C): 36.9 (21 Nov 2017 05:24), Max: 37.7 (20 Nov 2017 21:35)  T(F): 98.5 (21 Nov 2017 05:24), Max: 99.8 (20 Nov 2017 21:35)  HR: 106 (21 Nov 2017 05:24) (103 - 108)  BP: 112/76 (21 Nov 2017 05:24) (112/76 - 136/85)  BP(mean): --  ABP: --  ABP(mean): --  RR: 16 (21 Nov 2017 05:24) (16 - 19)  SpO2: 96% (21 Nov 2017 05:24) (94% - 99%)           Input and Output:  I&O's Detail    19 Nov 2017 07:01  -  20 Nov 2017 07:00  --------------------------------------------------------  IN:    Oral Fluid: 1200 mL  Total IN: 1200 mL    OUT:  Total OUT: 0 mL    Total NET: 1200 mL      20 Nov 2017 07:01  -  21 Nov 2017 06:23  --------------------------------------------------------  IN:    Oral Fluid: 240 mL  Total IN: 240 mL    OUT:  Total OUT: 0 mL    Total NET: 240 mL          Lab Data                        12.3   8.6   )-----------( 305      ( 20 Nov 2017 06:37 )             37.8     11-20    140  |  104  |  7   ----------------------------<  79  4.3   |  30  |  0.86    Ca    8.9      20 Nov 2017 07:15              Review of Systems	      Objective     Physical Examination    head at  heart - s1s2  lungs - dec BS      Pertinent Lab findings & Imaging      Erin:  NO   Adequate UO     I&O's Detail    19 Nov 2017 07:01  -  20 Nov 2017 07:00  --------------------------------------------------------  IN:    Oral Fluid: 1200 mL  Total IN: 1200 mL    OUT:  Total OUT: 0 mL    Total NET: 1200 mL      20 Nov 2017 07:01  -  21 Nov 2017 06:23  --------------------------------------------------------  IN:    Oral Fluid: 240 mL  Total IN: 240 mL    OUT:  Total OUT: 0 mL    Total NET: 240 mL               Discussed with:     Cultures:	        Radiology

## 2017-11-21 NOTE — PROGRESS NOTE ADULT - SUBJECTIVE AND OBJECTIVE BOX
HPI:  43 y/o f pmhx anxiety, Crohn's disease on 6-MP, asthma, GERD presented with SOB since October 19. Reported drinking a large [employer] coffee in the beginning of October and noticed mold at the bottom of the cup. A few days later, developed fever 101-102, cough with productive green sputum, and chest tightness associated with the cough.     Patient examined at bedside with no events overnight.  She reports continued productive cough, worse after bronchoscopy and a slight fever after the procedure that has resolved.  She reports that she is having mild LRQ abdominal pain that abdominal pain that she relates to her Crohns disease.  She reports that this normally resolves with miralax, and that she will be receiving miralax with her noon meal.  She reports that the ear pressure she was feeling yesterday has resolved and that she only has sinus congestion this morning. She denies changes in stool color, n/v/d, heart palpitations, dizziness, sore throat, difficulty swallowing, headache, neck stiffness, confusion, pain with urination or changes in urinary frequency.    REVIEW OF SYSTEMS:    CONSTITUTIONAL: + fever No weakness or chills  EYES/ENT: No visual changes, no throat pain   RESPIRATORY: + cough, SOB; No wheezing, hemoptysis;   CARDIOVASCULAR: + chest pain; No leg swelling or palpitations  GASTROINTESTINAL: + abdominal pain; No nausea, vomiting, or hematemesis; No diarrhea or constipation. No melena or hematochezia.  GENITOURINARY: No dysuria, frequency or hematuria  NEUROLOGICAL: No dizziness, numbness, or weakness  SKIN: No itching, burning, rashes, or lesions   All other review of systems is negative unless indicated above.    VITAL SIGNS:  Vital Signs Last 24 Hrs  T(C): 36.9 (21 Nov 2017 05:24), Max: 37.7 (20 Nov 2017 21:35)  T(F): 98.5 (21 Nov 2017 05:24), Max: 99.8 (20 Nov 2017 21:35)  HR: 97 (21 Nov 2017 07:40) (97 - 108)  BP: 112/76 (21 Nov 2017 05:24) (112/76 - 136/85)  RR: 16 (21 Nov 2017 05:24) (16 - 19)  SpO2: 95% (21 Nov 2017 07:40) (95% - 99%)      PHYSICAL EXAM:     GENERAL: no acute distress  HEENT: NC/AT, EOMI, neck supple, MMM  RESPIRATORY: LCTAB/L, no rhonchi, rales, or wheezing: decreased lung sounds on R lower field posteriorly  CARDIOVASCULAR: RRR, no murmurs, gallops, rubs  ABDOMINAL: soft, RLQ tenderness,  non-distended, positive bowel sounds   EXTREMITIES: no clubbing, cyanosis, or edema  NEUROLOGICAL: alert and oriented x 3, non-focal  SKIN: no rashes or lesions   MUSCULOSKELETAL: no gross joint deformity                          12.3   8.6   )-----------( 305      ( 20 Nov 2017 06:37 )             37.8     11-20    140  |  104  |  7   ----------------------------<  79  4.3   |  30  |  0.86    Ca    8.9      20 Nov 2017 07:15        CAPILLARY BLOOD GLUCOSE      MEDICATIONS  (STANDING):  ALBUTerol    0.083% 2.5 milliGRAM(s) Nebulizer every 8 hours  ALPRAZolam 0.5 milliGRAM(s) Oral at bedtime  buDESOnide  80 MICROgram(s)/formoterol 4.5 MICROgram(s) Inhaler 2 Puff(s) Inhalation two times a day  DULoxetine 60 milliGRAM(s) Oral daily  loratadine 10 milliGRAM(s) Oral daily  mercaptopurine 75 milliGRAM(s) Oral daily  pantoprazole    Tablet 40 milliGRAM(s) Oral before breakfast  polyethylene glycol 3350 17 Gram(s) Oral daily  sodium chloride 0.65% Nasal 1 Spray(s) Both Nostrils three times a day HPI:  43 y/o f pmhx anxiety, Crohn's disease on 6-MP, asthma, GERD presented with SOB since October 19. Reported drinking a large [employer] coffee in the beginning of October and noticed mold at the bottom of the cup. A few days later, developed fever 101-102, cough with productive green sputum, and chest tightness associated with the cough.     Patient examined at bedside with no events overnight.  She reports continued productive cough, worse after bronchoscopy and a slight fever after the procedure that has resolved.  She reports that she is having mild LRQ abdominal pain that abdominal pain that she relates to her Crohns disease.  She reports that this normally resolves with miralax, and that she will be receiving miralax with her noon meal.  She reports that the ear pain has mostly resolved, but she is feeling a great deal of pressure. She denies changes in stool color, n/v/d, heart palpitations, dizziness, sore throat, difficulty swallowing, headache, neck stiffness, confusion, pain with urination or changes in urinary frequency.    REVIEW OF SYSTEMS:    CONSTITUTIONAL: + fever No weakness or chills  EYES/ENT: ear pressure; No visual changes, no throat pain   RESPIRATORY: + cough, SOB; No wheezing, hemoptysis;   CARDIOVASCULAR: + chest pain; No leg swelling or palpitations  GASTROINTESTINAL: + abdominal pain; No nausea, vomiting, or hematemesis; No diarrhea or constipation. No melena or hematochezia.  GENITOURINARY: No dysuria, frequency or hematuria  NEUROLOGICAL: No dizziness, numbness, or weakness  SKIN: No itching, burning, rashes, or lesions   All other review of systems is negative unless indicated above.    VITAL SIGNS:  Vital Signs Last 24 Hrs  T(C): 36.9 (21 Nov 2017 05:24), Max: 37.7 (20 Nov 2017 21:35)  T(F): 98.5 (21 Nov 2017 05:24), Max: 99.8 (20 Nov 2017 21:35)  HR: 97 (21 Nov 2017 07:40) (97 - 108)  BP: 112/76 (21 Nov 2017 05:24) (112/76 - 136/85)  RR: 16 (21 Nov 2017 05:24) (16 - 19)  SpO2: 95% (21 Nov 2017 07:40) (95% - 99%)      PHYSICAL EXAM:     GENERAL: no acute distress  HEENT: NC/AT, EOMI, neck supple, MMM; Left ear erythematous surrounding tympanic membrane  RESPIRATORY: LCTAB/L, no rhonchi, rales, or wheezing: decreased lung sounds on R lower field posteriorly  CARDIOVASCULAR: RRR, no murmurs, gallops, rubs  ABDOMINAL: soft, RLQ tenderness,  non-distended, positive bowel sounds   EXTREMITIES: no clubbing, cyanosis, or edema  NEUROLOGICAL: alert and oriented x 3, non-focal  SKIN: no rashes or lesions   MUSCULOSKELETAL: no gross joint deformity                          12.3   8.6   )-----------( 305      ( 20 Nov 2017 06:37 )             37.8     11-20    140  |  104  |  7   ----------------------------<  79  4.3   |  30  |  0.86    Ca    8.9      20 Nov 2017 07:15        CAPILLARY BLOOD GLUCOSE      MEDICATIONS  (STANDING):  ALBUTerol    0.083% 2.5 milliGRAM(s) Nebulizer every 8 hours  ALPRAZolam 0.5 milliGRAM(s) Oral at bedtime  buDESOnide  80 MICROgram(s)/formoterol 4.5 MICROgram(s) Inhaler 2 Puff(s) Inhalation two times a day  DULoxetine 60 milliGRAM(s) Oral daily  loratadine 10 milliGRAM(s) Oral daily  mercaptopurine 75 milliGRAM(s) Oral daily  pantoprazole    Tablet 40 milliGRAM(s) Oral before breakfast  polyethylene glycol 3350 17 Gram(s) Oral daily  sodium chloride 0.65% Nasal 1 Spray(s) Both Nostrils three times a day

## 2017-11-21 NOTE — PROGRESS NOTE ADULT - PROBLEM SELECTOR PLAN 2
Atypical, likely 2/2 tightness from cough/asthma symptoms  -Dr. Dominguez consulted, appreciate recs  -echo pending

## 2017-11-21 NOTE — PROGRESS NOTE ADULT - PROBLEM SELECTOR PLAN 2
s/p bronch  poss lower resp tract infection  pt needs ID eval and possible course of broad spectrum ABX  bronch exam shows likely infectious etiology behind pt's symptoms

## 2017-11-21 NOTE — CONSULT NOTE ADULT - ASSESSMENT
Exam not very impressive, and my main concern at this point is that this postinfectious process.  She is completely comfortable on room air. Empiric antibiotics have not made an impact.  She only has a modest degree of immune suppression related to 6MP.  WBC normal  Autoimmune serologies largely pending.      Suggestions--  Barring clinical deterioration I would add antibiotics presently.  Await Bronch studies.  Discussed with patient and her   Discussed with Dr. Perlman & Dr. Voss    Thank you for the courtesy of this referral.      Morales Garrido MD  [phone]

## 2017-11-21 NOTE — PROGRESS NOTE ADULT - SUBJECTIVE AND OBJECTIVE BOX
[facility]    CHIEF COMPLAINT: Patient is a 42y old  Female who presents with a chief complaint of SOB cough (20 Nov 2017 15:00)      Follow Up: [ ] Chest Pain      [ ] Dyspnea     [ ] Palpitations    [ ] Atrial Fibrillation     [ ] Ventricular Dysrhythmia    [ ] Abnormal EKG                      [ ] Abnormal Cardiac Enzymes     [ ] Valvular Disease    HPI:  43 y/o f pmhx anxiety, Crohn's disease on 6-MP, asthma, GERD presented with SOB since October 19. Reported drinking a large [employer] coffee in the beginning of October and noticed mold at the bottom of the cup. A few days later, developed fever 101-102, cough with productive green sputum, and chest tightness associated with the cough. The patient was seen by her PMD who rx Augmentin. Fever resolved, productive cough continued. The following week she was seen by urgent care as she was not better and sent home with steroids. The cough/SOB did not resolve and she was seen by Pulmonologist, Dr. Castaneda and given Levaquin, inhaler, and prednisone. She did not feel better and saw an allergist who took her off of all of the medications. Since she continued to have cough/SOB she returned to her pulmonologist who put her back on 60mg qd of Prednisone. Pt admitted to productive cough with green sputum, headache, stuffy nose. She also admitted to 1 episode of blood tinged sputum weeks ago which has not returned and was attributed to trauma from the cough. She  Denied sore throat, fever, chills, abd pain, n/v/d.  Reported daughter at home is sick with sinusitis. Denied recent travel.     Of note, patient recently in the ED on November 10/2017 for similar symptoms. Blood cultures negative. CT angio showed subtle bilateral micronodular groundglass centrilobular tree-in-bud opacities; which may reflect a bronchiolitis. No lobar lung consolidation or pleural effusion.     In the ED: /82, HR 98  WBC 18.1  CXR: The lungs are clear. The previously noted micronodular groundglass centrilobular opacities are not well seen on chest x-ray examination. The heart size is normal. The visualized osseous structures are unremarkable.  Pt received IV solumedrol and duonebs. (17 Nov 2017 18:31)    S/P bronch yesterday  Possible infection     S/p echo: nl LVEF, no significant valve disease, normal R sided chambers    PAST MEDICAL & SURGICAL HISTORY:  GERD (gastroesophageal reflux disease)  Anxiety  Asthma  Crohn's disease  OCD (obsessive compulsive disorder)  No significant past surgical history      MEDICATIONS  (STANDING):  ALBUTerol    0.083% 2.5 milliGRAM(s) Nebulizer every 8 hours  ALPRAZolam 0.5 milliGRAM(s) Oral at bedtime  buDESOnide  80 MICROgram(s)/formoterol 4.5 MICROgram(s) Inhaler 2 Puff(s) Inhalation two times a day  DULoxetine 60 milliGRAM(s) Oral daily  loratadine 10 milliGRAM(s) Oral daily  mercaptopurine 75 milliGRAM(s) Oral daily  pantoprazole    Tablet 40 milliGRAM(s) Oral before breakfast  polyethylene glycol 3350 17 Gram(s) Oral daily  pseudoephedrine Solution 15 milliGRAM(s) Oral three times a day  sodium chloride 0.65% Nasal 1 Spray(s) Both Nostrils three times a day    MEDICATIONS  (PRN):  acetaminophen   Tablet. 650 milliGRAM(s) Oral every 6 hours PRN Mild Pain (1 - 3)  ALBUTerol    90 MICROgram(s) HFA Inhaler 2 Puff(s) Inhalation every 6 hours PRN Shortness of Breath and/or Wheezing  ALPRAZolam 0.5 milliGRAM(s) Oral daily PRN anxiety  benzonatate 100 milliGRAM(s) Oral every 8 hours PRN Cough  guaiFENesin    Syrup 200 milliGRAM(s) Oral every 6 hours PRN Cough  oxyCODONE    5 mG/acetaminophen 325 mG 1 Tablet(s) Oral every 4 hours PRN Moderate Pain (4 - 6)      Allergies    Demerol HCl (Rash)    Intolerances        REVIEW OF SYSTEMS:    CONSTITUTIONAL: No weakness, fevers or chills.   EYES/ENT: No visual changes;  No vertigo or throat pain   NECK: No pain or stiffness  RESPIRATORY: No cough, wheezing, hemoptysis; No shortness of breath  CARDIOVASCULAR: No chest pain or palpitations  GASTROINTESTINAL: No abdominal or epigastric pain. No nausea, vomiting, or hematemesis; No diarrhea or constipation. No melena or hematochezia.  GENITOURINARY: No dysuria, frequency or hematuria  NEUROLOGICAL: No numbness or weakness  SKIN: No itching, burning, rashes, or lesions   All other review of systems is negative unless indicated above    Vital Signs Last 24 Hrs  T(C): 36.9 (21 Nov 2017 05:24), Max: 37.7 (20 Nov 2017 21:35)  T(F): 98.5 (21 Nov 2017 05:24), Max: 99.8 (20 Nov 2017 21:35)  HR: 97 (21 Nov 2017 07:40) (97 - 108)  BP: 112/76 (21 Nov 2017 05:24) (112/76 - 136/85)  BP(mean): --  RR: 16 (21 Nov 2017 05:24) (16 - 19)  SpO2: 95% (21 Nov 2017 07:40) (95% - 99%)    I&O's Summary    20 Nov 2017 07:01  -  21 Nov 2017 07:00  --------------------------------------------------------  IN: 240 mL / OUT: 0 mL / NET: 240 mL    21 Nov 2017 07:01  -  21 Nov 2017 09:14  --------------------------------------------------------  IN: 360 mL / OUT: 0 mL / NET: 360 mL        PHYSICAL EXAM:    Constitutional: NAD, awake, alert  Neurological: Alert, no focal deficits  HEENT: no JVD, EOMI  Cardiovascular: Regular, S1 and S2, no murmur  Pulmonary: faint crackle Bases  Gastrointestinal: Bowel Sounds present, soft, nontender  EXT:  no peripheral edema  Skin: No rashes.  Psych:  Mood & affect appropriate    LABS: All Labs Reviewed:                        12.3   8.6   )-----------( 305      ( 20 Nov 2017 06:37 )             37.8     11-20    140  |  104  |  7   ----------------------------<  79  4.3   |  30  |  0.86    Ca    8.9      20 Nov 2017 07:15            Blood Culture: Organism --  Gram Stain Blood -- Gram Stain   Numerous polymorphonuclear leukocytes per low power field  Rare Squamous epithelial cells per low power field  Rare Gram Variable Rods per oil power field  Numerous Gram Positive Cocci in Pairs and Chains per oil power field  Specimen Source .Broncial Bronchoalveolar Washings  Culture-Blood --    Organism --  Gram Stain Blood -- Gram Stain --  Specimen Source .Blood Blood-Peripheral  Culture-Blood --        · Assessment		  42F with PMH anxiety, Crohn's disease on 6-MP, asthma presents with cough/SOB since October 19.  Chronic cough. Chest pain/chest tightness/back pain, atypical. Not likely cardiac.  Hx of normal CCTA last year, with Ca score 0. S/P bronch yesterday, Possible infection. S/p echo: nl LVEF, no significant valve disease, normal R sided chambers.    F/U ID Recs.    Will sign off, call if needed.

## 2017-11-21 NOTE — CONSULT NOTE ADULT - SUBJECTIVE AND OBJECTIVE BOX
[facility] Cardiology    CHIEF COMPLAINT: Patient is a 42y old  Female who presents with a chief complaint of SOB cough (17 Nov 2017 19:41)      HPI:  41 y/o f pmhx anxiety, Crohn's disease on 6-MP, asthma, GERD presented with SOB since October 19. Reported drinking a large Raymond Donut coffee in the beginning of October and noticed mold at the bottom of the cup. A few days later, developed fever 101-102, cough with productive green sputum, and chest tightness associated with the cough. The patient was seen by her PMD who rx Augmentin. Fever resolved, productive cough continued. The following week she was seen by urgent care as she was not better and sent home with steroids. The cough/SOB did not resolve and she was seen by Pulmonologist, Dr. Castaneda and given Levaquin, inhaler, and prednisone. She did not feel better and saw an allergist who took her off of all of the medications. Since she continued to have cough/SOB she returned to her pulmonologist who put her back on 60mg qd of Prednisone. Pt admitted to productive cough with green sputum, headache, stuffy nose. She also admitted to 1 episode of blood tinged sputum weeks ago which has not returned and was attributed to trauma from the cough. She  Denied sore throat, fever, chills, abd pain, n/v/d.  Reported daughter at home is sick with sinusitis. Denied recent travel.     Of note, patient recently in the ED on November 10/2017 for similar symptoms. Blood cultures negative. CT angio showed subtle bilateral micronodular groundglass centrilobular tree-in-bud opacities; which may reflect a bronchiolitis. No lobar lung consolidation or pleural effusion.     In the ED: /82, HR 98  WBC 18.1  CXR: The lungs are clear. The previously noted micronodular groundglass centrilobular opacities are not well seen on chest x-ray examination. The heart size is normal. The visualized osseous structures are unremarkable.  Pt received IV solumedrol and duonebs. (17 Nov 2017 18:31)    Pt with ongoing cough, sob    PAST MEDICAL & SURGICAL HISTORY:  GERD (gastroesophageal reflux disease)  Anxiety  Asthma  Crohn's disease  OCD (obsessive compulsive disorder)  No significant past surgical history    SOCIAL HISTORY: no tobacco    FAMILY HISTORY:  No pertinent family history in first degree relatives      MEDICATIONS  (STANDING):  buDESOnide  80 MICROgram(s)/formoterol 4.5 MICROgram(s) Inhaler 2 Puff(s) Inhalation two times a day  DULoxetine 60 milliGRAM(s) Oral daily  mercaptopurine 75 milliGRAM(s) Oral daily  pantoprazole    Tablet 40 milliGRAM(s) Oral before breakfast  polyethylene glycol 3350 17 Gram(s) Oral daily  sodium chloride 0.65% Nasal 1 Spray(s) Both Nostrils three times a day    MEDICATIONS  (PRN):  ALBUTerol    90 MICROgram(s) HFA Inhaler 2 Puff(s) Inhalation every 6 hours PRN Shortness of Breath and/or Wheezing  ALPRAZolam 0.5 milliGRAM(s) Oral daily PRN anxiety  benzonatate 100 milliGRAM(s) Oral every 8 hours PRN Cough  guaiFENesin    Syrup 200 milliGRAM(s) Oral every 6 hours PRN Cough      Allergies    Demerol HCl (Rash)    Intolerances      REVIEW OF SYSTEMS:  CONSTITUTIONAL: No weakness, no fevers   EYES/ENT: No visual changes  NECK: No pain or stiffness  RESPIRATORY: shortness of breath  CARDIOVASCULAR: chest pain or palpitations  GASTROINTESTINAL: No abdominal pain  GENITOURINARY: No hematuria  NEUROLOGICAL: No weakness  SKIN: No rash  All other review of systems is negative unless indicated above    VITAL SIGNS:   Vital Signs Last 24 Hrs  T(C): 36.6 (18 Nov 2017 05:03), Max: 36.7 (17 Nov 2017 14:40)  T(F): 97.9 (18 Nov 2017 05:03), Max: 98.1 (17 Nov 2017 20:26)  HR: 89 (18 Nov 2017 05:03) (89 - 105)  BP: 111/70 (18 Nov 2017 05:03) (111/70 - 138/82)  BP(mean): --  RR: 16 (18 Nov 2017 05:03) (16 - 18)  SpO2: 93% (18 Nov 2017 05:03) (93% - 99%)  I&O's Summary    PHYSICAL EXAM:  Constitutional: mild distress  Neurological: Alert and oriented  HEENT: EOMI, no JVD  Cardiovascular: S1 and S2, REG, no murm  Pulmonary: faint crackles  Gastrointestinal: Bowel Sounds present, soft, nontender  Ext: peripheral edema  Skin: No rashes, No cyanosis.  Psych:  Mood & affect appropriate   LABS: All Labs Reviewed:                        12.0   13.6  )-----------( 312      ( 18 Nov 2017 06:22 )             36.7     11-18    142  |  108  |  9   ----------------------------<  96  3.7   |  24  |  0.81    Ca    8.2<L>      18 Nov 2017 06:22  Mg     2.1     11-17    TPro  7.8  /  Alb  3.5  /  TBili  0.3  /  DBili  x   /  AST  11<L>  /  ALT  19  /  AlkPhos  71  11-17    PT/INR - ( 17 Nov 2017 16:24 )   PT: 11.7 sec;   INR: 1.07 ratio         PTT - ( 17 Nov 2017 16:24 )  PTT:29.2 sec  CARDIAC MARKERS ( 17 Nov 2017 16:24 )  <.015 ng/mL / x     / 48 U/L / x     / <0.5 ng/mL    42F with PMH anxiety, Crohn's disease on 6-MP, asthma presents with SOB since October 19.  Chronic cough. Chest pain/chest tightness, atypical.   Hx of normal CCTA last year.     RADIOLOGY/EKG: NSR, no ST-T changes    No cardiac contraindication to bronchoscopy monday.
[facility]         Javed Honeycutt, Chrissy, Endy, [place], Wang, Patricia        [phone] (office)    CHIEF COMPLAINT: Patient is a 42y old  Female who presents with a chief complaint of SOB    HPI: 42F with PMH anxiety, Crohn's disease on 6-MP, asthma presents with SOB since October 19. Reports drinking a large Raymond Donut coffee in the beginning of October and noticed mold at the bottom of the cup. A few days later, developed fever 101-102, cough with productive green sputum, and chest pain associated with the cough. Fever resolved, productive cough continued. Went to her Pulmonologist, Dr. Castaneda, was given antibiotics, inhaler, and prednisone. Reports using Prednisone 20mg for 5 days, currently only 60mg Prednisone. Has completed course of Augmentin and Levaquin, without improvement of symptoms. Currently reports productive cough, headache. Denies sore throat, fever, chills, abd pain, n/v/d.  Reports daughter at home is sick, denies sick contacts at work (works for [employer]). Denies recent travel.     Of note, patient recently in the ED on November 10/2017 for similar symptoms. Blood cultures negative. CT angio showed subtle bilateral micronodular groundglass centrilobular tree-in-bud opacities; which may reflect a bronchiolitis. No lobar lung consolidation or pleural effusion.         PAST MEDICAL & SURGICAL HISTORY:  Asthma  Crohn's disease  OCD (obsessive compulsive disorder)  No significant past surgical history      SOCIAL HISTORY: No active tobacco, alcohol or illicit drug use    FAMILY HISTORY:    Outpatient medications:  DULoxetine 60 mg oral delayed release capsule: 1 cap(s) orally once a day (17 Nov 2017 14:45)  mercaptopurine 50mg 1.5 tab every other day  (17 Nov 2017 14:45)  omeprazole 40 mg oral delayed release capsule:  (17 Nov 2017 14:45)  Xanax .5mg PRN  Levalbuterol     MEDICATIONS  (STANDING):    MEDICATIONS  (PRN):      Allergies    Demerol HCl (Rash)    Intolerances      REVIEW OF SYSTEMS: Is negative for eye, ENT, GI, , allergic, dermatologic, musculoskeletal and neurologic, except as described above.    VITAL SIGNS:   Vital Signs Last 24 Hrs  T(C): 36.7 (17 Nov 2017 14:40), Max: 36.7 (17 Nov 2017 14:40)  T(F): 98 (17 Nov 2017 14:40), Max: 98 (17 Nov 2017 14:40)  HR: 98 (17 Nov 2017 14:40) (98 - 98)  BP: 138/82 (17 Nov 2017 14:40) (138/82 - 138/82)  BP(mean): --  RR: 16 (17 Nov 2017 14:40) (16 - 16)  SpO2: 98% (17 Nov 2017 14:40) (98% - 98%)      PHYSICAL EXAM:  Constitutional: NAD, awake and alert, well-developed  Eyes:  EOMI, no oral cyanosis, conjunctivae clear, anicteric.  Pulmonary: Non-labored, breath sounds are clear bilaterally, no wheezing, rales or rhonchi  Cardiovascular:  regular S1 and S2. No murmur.  No rubs, gallops or clicks  Gastrointestinal: Bowel Sounds present, soft, nontender.   Lymph: No peripheral edema.   Neurological: Alert, strength and sensitivity are grossly intact  Skin: No obvious lesions/rashes.   Psych:  Mood & affect appropriate .    LABS: All Labs Reviewed:              RADIOLOGY: < from: Xray Chest 2 Views PA/Lat (11.17.17 @ 15:58) >    EXAM:  CHEST PA & LAT                            PROCEDURE DATE:  11/17/2017          INTERPRETATION:  Clinical information: Shortness of breath. Cough.    Technique: PA and lateral views of the chest.    Comparison: Prior chest CT examination from11/10/2017.    Findings: The lungs are clear. The previously noted micronodular   groundglass centrilobular opacities are not well seen on chest x-ray   examination. The heart size is normal. The visualized osseous structures   are unremarkable.    IMPRESSION: Clear lungs.                CARLOS BOYER M.D., ATTENDING RADIOLOGIST  This document has been electronically signed. Nov 17 2017  4:00PM                < end of copied text >      EKG:     Impression/Plan:
[facility], Division of Infectious Diseases  RENETTA Bonner A. Lee    ADEN, SHAKIRA  42y, Female  [id number]    HPI--  42F hx Crohn's disease on 6MP, no biologic therapy, anxiety/OCD, asthma presented intially with respiratory symptoms approximately one month ago. She initially was treated with augmentin, then augmentin plus steroids, then higher-dose steroids which wer discontinued due to tachycardia, then levofloxacin all without much improvement. Patient's main symptom is GOLD. No CP. Scant cough. No fevers, chills, or rigors. No similar episodes. Denies sick contacts. Patient had seen a pulmonolgist and states PFT 'normal'    Here patient had a CT scan that was consistent with bronchiolitis. She underwent bronchoscopy yesterday which revealed friable airways and copious secretions.     PMH/PSH--  GERD (gastroesophageal reflux disease)  Anxiety  Asthma  Crohn's disease  OCD (obsessive compulsive disorder)  No significant past surgical history      Allergies-- demerol -> "stopped breathing"      Medications--  Antibiotics:   Immunologic:   Other: acetaminophen   Tablet. PRN  ALBUTerol    0.083%  ALBUTerol    90 MICROgram(s) HFA Inhaler PRN  ALPRAZolam PRN  ALPRAZolam  benzonatate PRN  buDESOnide  80 MICROgram(s)/formoterol 4.5 MICROgram(s) Inhaler  DULoxetine  guaiFENesin    Syrup PRN  loratadine  mercaptopurine  oxyCODONE    5 mG/acetaminophen 325 mG PRN  pantoprazole    Tablet  polyethylene glycol 3350  pseudoephedrine Solution  sodium chloride 0.65% Nasal      Social History--  EtOH: denies   Tobacco: denies   Drug Use: denies     Family/Marital History--  No pertinent family history in first degree relatives  Remainder not relevant to clinical concern.    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     Pertinent positives and negatives--  Constitutional: No fevers. No Chills. No Rigors.   Eyes: denies.   ENMT: some ear pain  Cardiovascular: No chest pain. No palpitations.  Respiratory: +shortness of breath. +cough.  Gastrointestinal: No nausea or vomiting. No diarrhea or constipation.   Genitourinary: denies.   Musculoskeletal: denies.   Skin: denies.   Neurologic: denies.   Psychiatric: Pleasant. Level of anxiety affect.  Endocrine:  Heme/Lymphatic:  Allergy/Immunologic:    Review of systems otherwise negative except as previously noted.    Physical Exam--  Vital Signs: T(F): 98.5 (11-21-17 @ 05:24), Max: 99.8 (11-20-17 @ 21:35)  HR: 97 (11-21-17 @ 07:40)  BP: 112/76 (11-21-17 @ 05:24)  RR: 16 (11-21-17 @ 05:24)  SpO2: 95% (11-21-17 @ 07:40)  Wt(kg): --  General: Nontoxic-appearing Female in no acute distress.  HEENT: AT/NC. PERRL. EOMI. Anicteric. Conjunctiva pink and moist. Oropharynx clear. Dentition fair.  Neck: Not rigid. No sense of mass.  Nodes: None palpable.  Lungs: Diminished breath sound bilaterally without rales, wheezing or rhonchi  Heart: Regular rate and rhythm. No Murmur. No rub. No gallop. No palpable thrill.  Abdomen: Bowel sounds present and normoactive. Soft. Nondistended. Nontender. No sense of mass. No organomegaly.  Back: No spinal tenderness. No costovertebral angle tenderness.   Extremities: No cyanosis or clubbing. No edema.   Skin: Warm. Dry. Good turgor. No rash. No vasculitic stigmata.  Psychiatric: Appropriate affect. Degree of anxiety incongruent with situation.       Laboratory & Imaging Data--  CBC                        12.3   8.6   )-----------( 305      ( 20 Nov 2017 06:37 )             37.8       Chemistries  11-20    140  |  104  |  7   ----------------------------<  79  4.3   |  30  |  0.86    Ca    8.9      20 Nov 2017 07:15    Procalcitonin, Serum: <0.05    Rapid Respiratory Viral Panel (11.20.17 @ 17:34)    Rapid RVP Result: NotDetec: The Film[employer] RVP Rapid uses polymerase chain reaction (PCR) and melt  curve analysis to screen for adenovirus; coronavirus HKU1, NL63, 229E,  OC43; human metapneumovirus (hMPV); human enterovirus/rhinovirus  (Entero/RV); influenza A; influenza A/H1;influenza A/H3; influenza  A/H1-2009; influenza B; parainfluenza viruses 1, 2, 3, 4; respiratory  syncytial virus; Bordetella pertussis; Mycoplasma pneumoniae; and  Chlamydophila pneumoniae.    Neutrophil Cytoplasmic Antibody (11.17.17 @ 22:40)    Cytoplasmic (c-ANCA) Antibody: Negative    Perinuclear (p-ANCA) Antibody: Negative    Atypical ANCA: Negative    Lupus Profile (11.17.17 @ 22:40)    DRVVT Inhibitor Screen: 34.7: The presence of direct thrombin inhibitors (such as argatroban,  refludan), or direct  Xa inhibitors (such as fondaparinux)  may cause  false positive results. sec    DRVVT S/C Ratio: LA NEG    Silica Clotting Time S/C Ratio: 1.18: Please note that the reference range has changed as of 7/29/15 ratio    Silica Clotting Time Interpretation: LA POS: NOTE: The presence of direct thrombin inhibitors (such as Argatroban,  Refludan), UF Heparin >0.5 U/ml or LMW Heparin >1.0 U/ml may affect  Results.      < from: CT Angio Chest w/ IV Cont (11.10.17 @ 14:01) >  EXAM:  CT ANGIO CHEST (W)AW IC                        PROCEDURE DATE:  11/10/2017    INTERPRETATION:  CLINICAL INFORMATION:  Progressive chest pain and back   pain.    PROCEDURE:  Using multislice helical technique,  CT angiography, 1.5 mm   sections were obtained  following the intravenous administration of 85   ccs of Omnipaque 350.  Multiplanar MIP reconstructed images were obtained.  There was leakage from the original IV bolus contrast, repeat contrast   bolus was performed.    FINDINGS:    There is no evidence of intraluminal filling defects to suggest central   or interlobar pulmonary emboli. Artifact limits evaluation of the   segmental pulmonary artery branches in the lower lung zones bilaterally.    The abdominal aortais normal in caliber, there is no evidence for aortic   dissection.  No enlarged mediastinal or hilar lymphadenopathy is noted.    There is subtle bilateral micronodular groundglass centrilobular   tree-in-bud opacities; which may reflect a bronchiolitis.  No lobar lung consolidation or pleural effusion is noted.  The central airways remain patent.    Impression:  No CT evidence for acute pulmonary embolism.    Bilateral micronodular centrilobular groundglass and tree-in-bud   opacities, which may reflect bronchiolitis.    ARGENIS ENGLAND M.D., ATTENDING RADIOLOGIST  This document has been electronically signed. Nov 10 2017  2:19PM  < end of copied text >      Culture Data  Culture - Fungal, Bronchial (collected 11-20-17 @ 22:25)  Source: .Broncial Bronchoalveolar Washings  Preliminary Report (11-21-17 @ 08:51):    Testing in progress    Culture - Bronchial (collected 11-20-17 @ 22:25)  Source: .Broncial Bronchoalveolar Washings  Gram Stain (11-21-17 @ 01:27):    Numerous polymorphonuclear leukocytes per low power field    Rare Squamous epithelial cells per low power field    Rare Gram Variable Rods per oil power field    Numerous Gram Positive Cocci in Pairs and Chains per oil power field
Date/Time Patient Seen:  		  Referring MD:   Data Reviewed	       Patient is a 42y old  Female who presents with a chief complaint of SOB    Subjective/HPI  sob  allen  x 1 month sx  seen by Allergist and Pulmonologist as outpatient / no definitive diagnosis, was on steroids, 2 courses of ABX, inhalers,   cta chest in ED shows GGO and tree in bud  pt has crohn's and is maintained on 6 MP medication for IBD  no recent travel  no sick contacts  [place], lives at home, , 3 children    works in "[employer]         PAST MEDICAL & SURGICAL HISTORY:  Asthma  Crohn's disease  OCD (obsessive compulsive disorder)  No significant past surgical history        Medication list         MEDICATIONS  (STANDING):  buDESOnide  80 MICROgram(s)/formoterol 4.5 MICROgram(s) Inhaler 2 Puff(s) Inhalation two times a day  sodium chloride 0.65% Nasal 1 Spray(s) Both Nostrils three times a day    MEDICATIONS  (PRN):  ALBUTerol    90 MICROgram(s) HFA Inhaler 2 Puff(s) Inhalation every 6 hours PRN Shortness of Breath and/or Wheezing  benzonatate 100 milliGRAM(s) Oral every 8 hours PRN Cough  guaiFENesin    Syrup 200 milliGRAM(s) Oral every 6 hours PRN Cough         Vitals log        ICU Vital Signs Last 24 Hrs  T(C): 36.7 (17 Nov 2017 14:40), Max: 36.7 (17 Nov 2017 14:40)  T(F): 98 (17 Nov 2017 14:40), Max: 98 (17 Nov 2017 14:40)  HR: 98 (17 Nov 2017 14:40) (98 - 98)  BP: 138/82 (17 Nov 2017 14:40) (138/82 - 138/82)  BP(mean): --  ABP: --  ABP(mean): --  RR: 16 (17 Nov 2017 14:40) (16 - 16)  SpO2: 98% (17 Nov 2017 14:40) (98% - 98%)           Input and Output:  I&O's Detail      Lab Data                        13.5   18.1  )-----------( 353      ( 17 Nov 2017 16:24 )             40.6     11-17    141  |  107  |  10  ----------------------------<  110<H>  3.8   |  24  |  0.92    Ca    8.5      17 Nov 2017 16:24    TPro  7.8  /  Alb  3.5  /  TBili  0.3  /  DBili  x   /  AST  11<L>  /  ALT  19  /  AlkPhos  71  11-17      non smoker  non drinker        Review of Systems	  sob  anxious  allen      Objective     Physical Examination  obese  head at  heart - s1s2  lungs - dec BS  no wheezing  cn grossly int        Pertinent Lab findings & Imaging      Khan:  NO   Adequate UO     I&O's Detail           Discussed with:     Cultures:	        Radiology            EXAM:  CT ANGIO CHEST (W)AW IC                            PROCEDURE DATE:  11/10/2017          INTERPRETATION:  CLINICAL INFORMATION:  Progressive chest pain and back   pain.    PROCEDURE:  Using multislice helical technique,  CT angiography, 1.5 mm   sections were obtained  following the intravenous administration of 85   ccs of Omnipaque 350.  Multiplanar MIP reconstructed images were obtained.  There was leakage from the original IV bolus contrast, repeat contrast   bolus was performed.    FINDINGS:      There is no evidence of intraluminal filling defects to suggest central   or interlobar pulmonary emboli. Artifact limits evaluation of the   segmental pulmonary artery branches in the lower lung zones bilaterally.    The abdominal aorta is normal in caliber, there is no evidence for aortic   dissection.  No enlarged mediastinal or hilar lymphadenopathy is noted.    There is subtle bilateral micronodular groundglass centrilobular   tree-in-bud opacities; which may reflect a bronchiolitis.  No lobar lung consolidation or pleural effusion is noted.  The central airways remain patent.    Impression:    No CT evidence for acute pulmonary embolism.    Bilateral micronodular centrilobular groundglass and tree-in-bud   opacities, which may reflect bronchiolitis.                              ARGENIS ENGLAND M.D., ATTENDING RADIOLOGIST  This document has been electronically signed. Nov 10 2017  2:19PM

## 2017-11-21 NOTE — PROGRESS NOTE ADULT - PROBLEM SELECTOR PLAN 1
-Dr. Voss consulted, appreciate recs  -Broncoscopy yesterday: culture + for gram + cocci in pairs  -Proventil and Symbicort prn  -Geovanna and Aris arnold prn for cough  -Hold steroids and antibiotic as per pulmonary  -IVF  -Hickman spray  -F/u aspergillus Ab, certromere Ab, allergy panel, lupus profile, thyroid studies  -F/u blood and urine culture.  - ID consult    bronchiolitis on ct of chest as noted  no medical contraindications to bronchoscopy -Dr. Voss consulted, appreciate recs  -Broncoscopy yesterday: culture + for gram + cocci in pairs  -Proventil and Symbicort prn  -Geovanna and Aris arnold prn for cough  -Hold steroids and antibiotic as per pulmonary  -IVF  -Greene spray  -F/u aspergillus Ab, certromere Ab, allergy panel, lupus profile, thyroid studies  -F/u blood and urine culture.  - ID consult, recs appreciated    bronchiolitis on ct of chest as noted  no medical contraindications to bronchoscopy

## 2017-11-22 VITALS — OXYGEN SATURATION: 97 %

## 2017-11-22 LAB
-  AZITHROMYCIN: SIGNIFICANT CHANGE UP
-  CLINDAMYCIN: SIGNIFICANT CHANGE UP
-  ERYTHROMYCIN: SIGNIFICANT CHANGE UP
-  LEVOFLOXACIN: SIGNIFICANT CHANGE UP
-  PENICILLIN: SIGNIFICANT CHANGE UP
-  TRIMETHOPRIM/SULFAMETHOXAZOLE: SIGNIFICANT CHANGE UP
-  VANCOMYCIN: SIGNIFICANT CHANGE UP
ANION GAP SERPL CALC-SCNC: 6 MMOL/L — SIGNIFICANT CHANGE UP (ref 5–17)
ASPERGILLUS FLAVUS PRECIPITINS: NEGATIVE — SIGNIFICANT CHANGE UP
ASPERGILLUS NIGER PRECIPITINS: NEGATIVE — SIGNIFICANT CHANGE UP
BUN SERPL-MCNC: 5 MG/DL — LOW (ref 7–23)
CALCIUM SERPL-MCNC: 8.8 MG/DL — SIGNIFICANT CHANGE UP (ref 8.5–10.1)
CHLORIDE SERPL-SCNC: 104 MMOL/L — SIGNIFICANT CHANGE UP (ref 96–108)
CO2 SERPL-SCNC: 31 MMOL/L — SIGNIFICANT CHANGE UP (ref 22–31)
CREAT SERPL-MCNC: 0.88 MG/DL — SIGNIFICANT CHANGE UP (ref 0.5–1.3)
CULTURE RESULTS: SIGNIFICANT CHANGE UP
DEPRECATED A FUMIGATUS IGG RAST QL: NEGATIVE — SIGNIFICANT CHANGE UP
GLUCOSE SERPL-MCNC: 87 MG/DL — SIGNIFICANT CHANGE UP (ref 70–99)
HCT VFR BLD CALC: 37.9 % — SIGNIFICANT CHANGE UP (ref 34.5–45)
HGB BLD-MCNC: 12.2 G/DL — SIGNIFICANT CHANGE UP (ref 11.5–15.5)
M PNEUMO IGG SER IA-ACNC: >3.18 INDEX — HIGH
M PNEUMO IGG SER IA-ACNC: POSITIVE
M PNEUMO IGM SER-ACNC: 385 UNITS/ML — SIGNIFICANT CHANGE UP
MCHC RBC-ENTMCNC: 30.2 PG — SIGNIFICANT CHANGE UP (ref 27–34)
MCHC RBC-ENTMCNC: 32.3 GM/DL — SIGNIFICANT CHANGE UP (ref 32–36)
MCV RBC AUTO: 93.5 FL — SIGNIFICANT CHANGE UP (ref 80–100)
METHOD TYPE: SIGNIFICANT CHANGE UP
MYCOPLASMA AG SPEC QL: NEGATIVE — SIGNIFICANT CHANGE UP
ORGANISM # SPEC MICROSCOPIC CNT: SIGNIFICANT CHANGE UP
ORGANISM # SPEC MICROSCOPIC CNT: SIGNIFICANT CHANGE UP
PLATELET # BLD AUTO: 330 K/UL — SIGNIFICANT CHANGE UP (ref 150–400)
POTASSIUM SERPL-MCNC: 4.3 MMOL/L — SIGNIFICANT CHANGE UP (ref 3.5–5.3)
POTASSIUM SERPL-SCNC: 4.3 MMOL/L — SIGNIFICANT CHANGE UP (ref 3.5–5.3)
RBC # BLD: 4.06 M/UL — SIGNIFICANT CHANGE UP (ref 3.8–5.2)
RBC # FLD: 14.8 % — HIGH (ref 10.3–14.5)
SODIUM SERPL-SCNC: 141 MMOL/L — SIGNIFICANT CHANGE UP (ref 135–145)
SPECIMEN SOURCE: SIGNIFICANT CHANGE UP
WBC # BLD: 8.9 K/UL — SIGNIFICANT CHANGE UP (ref 3.8–10.5)
WBC # FLD AUTO: 8.9 K/UL — SIGNIFICANT CHANGE UP (ref 3.8–10.5)

## 2017-11-22 PROCEDURE — 87184 SC STD DISK METHOD PER PLATE: CPT

## 2017-11-22 PROCEDURE — 84702 CHORIONIC GONADOTROPIN TEST: CPT

## 2017-11-22 PROCEDURE — 87015 SPECIMEN INFECT AGNT CONCNTJ: CPT

## 2017-11-22 PROCEDURE — 99221 1ST HOSP IP/OBS SF/LOW 40: CPT

## 2017-11-22 PROCEDURE — 99285 EMERGENCY DEPT VISIT HI MDM: CPT | Mod: 25

## 2017-11-22 PROCEDURE — 93005 ELECTROCARDIOGRAM TRACING: CPT

## 2017-11-22 PROCEDURE — 36415 COLL VENOUS BLD VENIPUNCTURE: CPT

## 2017-11-22 PROCEDURE — 86036 ANCA SCREEN EACH ANTIBODY: CPT

## 2017-11-22 PROCEDURE — 94640 AIRWAY INHALATION TREATMENT: CPT

## 2017-11-22 PROCEDURE — 88108 CYTOPATH CONCENTRATE TECH: CPT

## 2017-11-22 PROCEDURE — 93306 TTE W/DOPPLER COMPLETE: CPT

## 2017-11-22 PROCEDURE — 84484 ASSAY OF TROPONIN QUANT: CPT

## 2017-11-22 PROCEDURE — 83605 ASSAY OF LACTIC ACID: CPT

## 2017-11-22 PROCEDURE — 88312 SPECIAL STAINS GROUP 1: CPT

## 2017-11-22 PROCEDURE — 87798 DETECT AGENT NOS DNA AMP: CPT

## 2017-11-22 PROCEDURE — 71046 X-RAY EXAM CHEST 2 VIEWS: CPT

## 2017-11-22 PROCEDURE — 84443 ASSAY THYROID STIM HORMONE: CPT

## 2017-11-22 PROCEDURE — 80307 DRUG TEST PRSMV CHEM ANLYZR: CPT

## 2017-11-22 PROCEDURE — 87633 RESP VIRUS 12-25 TARGETS: CPT

## 2017-11-22 PROCEDURE — 82785 ASSAY OF IGE: CPT

## 2017-11-22 PROCEDURE — 86255 FLUORESCENT ANTIBODY SCREEN: CPT

## 2017-11-22 PROCEDURE — 84480 ASSAY TRIIODOTHYRONINE (T3): CPT

## 2017-11-22 PROCEDURE — 88305 TISSUE EXAM BY PATHOLOGIST: CPT

## 2017-11-22 PROCEDURE — 87040 BLOOD CULTURE FOR BACTERIA: CPT

## 2017-11-22 PROCEDURE — 80048 BASIC METABOLIC PNL TOTAL CA: CPT

## 2017-11-22 PROCEDURE — 87116 MYCOBACTERIA CULTURE: CPT

## 2017-11-22 PROCEDURE — 86606 ASPERGILLUS ANTIBODY: CPT

## 2017-11-22 PROCEDURE — 84436 ASSAY OF TOTAL THYROXINE: CPT

## 2017-11-22 PROCEDURE — 84145 PROCALCITONIN (PCT): CPT

## 2017-11-22 PROCEDURE — 94664 DEMO&/EVAL PT USE INHALER: CPT

## 2017-11-22 PROCEDURE — 87206 SMEAR FLUORESCENT/ACID STAI: CPT

## 2017-11-22 PROCEDURE — 87486 CHLMYD PNEUM DNA AMP PROBE: CPT

## 2017-11-22 PROCEDURE — 89051 BODY FLUID CELL COUNT: CPT

## 2017-11-22 PROCEDURE — 83735 ASSAY OF MAGNESIUM: CPT

## 2017-11-22 PROCEDURE — 82553 CREATINE MB FRACTION: CPT

## 2017-11-22 PROCEDURE — 82550 ASSAY OF CK (CPK): CPT

## 2017-11-22 PROCEDURE — 86001 ALLERGEN SPECIFIC IGG: CPT

## 2017-11-22 PROCEDURE — 86140 C-REACTIVE PROTEIN: CPT

## 2017-11-22 PROCEDURE — 85027 COMPLETE CBC AUTOMATED: CPT

## 2017-11-22 PROCEDURE — 96374 THER/PROPH/DIAG INJ IV PUSH: CPT

## 2017-11-22 PROCEDURE — 87581 M.PNEUMON DNA AMP PROBE: CPT

## 2017-11-22 PROCEDURE — 85730 THROMBOPLASTIN TIME PARTIAL: CPT

## 2017-11-22 PROCEDURE — 85610 PROTHROMBIN TIME: CPT

## 2017-11-22 PROCEDURE — 94760 N-INVAS EAR/PLS OXIMETRY 1: CPT

## 2017-11-22 PROCEDURE — 80053 COMPREHEN METABOLIC PANEL: CPT

## 2017-11-22 PROCEDURE — 82787 IGG 1 2 3 OR 4 EACH: CPT

## 2017-11-22 PROCEDURE — 86738 MYCOPLASMA ANTIBODY: CPT

## 2017-11-22 PROCEDURE — 87070 CULTURE OTHR SPECIMN AEROBIC: CPT

## 2017-11-22 PROCEDURE — 87102 FUNGUS ISOLATION CULTURE: CPT

## 2017-11-22 PROCEDURE — 86235 NUCLEAR ANTIGEN ANTIBODY: CPT

## 2017-11-22 RX ORDER — LORATADINE 10 MG/1
1 TABLET ORAL
Qty: 30 | Refills: 0
Start: 2017-11-22 | End: 2017-12-22

## 2017-11-22 RX ORDER — ALBUTEROL 90 UG/1
2 AEROSOL, METERED ORAL
Qty: 1 | Refills: 0
Start: 2017-11-22 | End: 2017-12-22

## 2017-11-22 RX ORDER — BUDESONIDE AND FORMOTEROL FUMARATE DIHYDRATE 160; 4.5 UG/1; UG/1
2 AEROSOL RESPIRATORY (INHALATION)
Qty: 1 | Refills: 0
Start: 2017-11-22

## 2017-11-22 RX ORDER — LEVALBUTEROL 1.25 MG/.5ML
2 SOLUTION, CONCENTRATE RESPIRATORY (INHALATION)
Qty: 0 | Refills: 0 | COMMUNITY

## 2017-11-22 RX ORDER — OXYMETAZOLINE HYDROCHLORIDE 0.5 MG/ML
1 SPRAY NASAL
Qty: 0 | Refills: 0 | COMMUNITY

## 2017-11-22 RX ADMIN — Medication 1 SPRAY(S): at 13:25

## 2017-11-22 RX ADMIN — LORATADINE 10 MILLIGRAM(S): 10 TABLET ORAL at 11:14

## 2017-11-22 RX ADMIN — ALBUTEROL 2.5 MILLIGRAM(S): 90 AEROSOL, METERED ORAL at 15:40

## 2017-11-22 RX ADMIN — Medication 15 MILLIGRAM(S): at 13:34

## 2017-11-22 RX ADMIN — POLYETHYLENE GLYCOL 3350 17 GRAM(S): 17 POWDER, FOR SOLUTION ORAL at 11:14

## 2017-11-22 RX ADMIN — Medication 15 MILLIGRAM(S): at 05:55

## 2017-11-22 RX ADMIN — DULOXETINE HYDROCHLORIDE 60 MILLIGRAM(S): 30 CAPSULE, DELAYED RELEASE ORAL at 11:14

## 2017-11-22 RX ADMIN — PANTOPRAZOLE SODIUM 40 MILLIGRAM(S): 20 TABLET, DELAYED RELEASE ORAL at 06:46

## 2017-11-22 RX ADMIN — MERCAPTOPURINE 75 MILLIGRAM(S): 50 TABLET ORAL at 11:40

## 2017-11-22 RX ADMIN — BUDESONIDE AND FORMOTEROL FUMARATE DIHYDRATE 2 PUFF(S): 160; 4.5 AEROSOL RESPIRATORY (INHALATION) at 06:45

## 2017-11-22 RX ADMIN — ALBUTEROL 2.5 MILLIGRAM(S): 90 AEROSOL, METERED ORAL at 08:11

## 2017-11-22 RX ADMIN — Medication 1 SPRAY(S): at 05:55

## 2017-11-22 NOTE — PROGRESS NOTE ADULT - SUBJECTIVE AND OBJECTIVE BOX
[facility], Division of Infectious Diseases  RENETTA Bonner A. Lee    Name: SHAKIRA SAMANIEGO  Age: 42y  Gender: Female  MRN: [id number]    Interval History--  Notes reviewed. Feeling ok. Bronch studies thus far only with S. pneumoniae growing. Feels ok. Cough about the same has some ear pain, otoscopic exam by housestaff with ?otitis media.    Past Medical History--  GERD (gastroesophageal reflux disease)  Anxiety  Asthma  Crohn's disease  OCD (obsessive compulsive disorder)  No significant past surgical history      For details regarding the patient's social history, family history, and other miscellaneous elements, please refer the initial infectious diseases consultation and/or the admitting history and physical examination for this admission.    Allergies    Demerol HCl (Rash)    Intolerances        Medications--  Antibiotics:    Immunologic:    Other:  acetaminophen   Tablet. PRN  ALBUTerol    0.083%  ALBUTerol    90 MICROgram(s) HFA Inhaler PRN  ALPRAZolam PRN  ALPRAZolam  benzonatate PRN  buDESOnide  80 MICROgram(s)/formoterol 4.5 MICROgram(s) Inhaler  DULoxetine  guaiFENesin    Syrup PRN  loratadine  mercaptopurine  oxyCODONE    5 mG/acetaminophen 325 mG PRN  pantoprazole    Tablet  polyethylene glycol 3350  pseudoephedrine Solution  sodium chloride 0.65% Nasal      Review of Systems--  Review of systems otherwise unchanged compared to prior visit except as previously noted.    Physical Examination--  Vital Signs: T(F): 97.7 (11-22-17 @ 05:30), Max: 98.5 (11-21-17 @ 21:48)  HR: 94 (11-22-17 @ 05:30)  BP: 112/80 (11-22-17 @ 05:30)  RR: 17 (11-22-17 @ 05:30)  SpO2: 96% (11-22-17 @ 05:30)  Wt(kg): --  General: Nontoxic-appearing Female in no acute distress.  HEENT: AT/NC. Anicteric. Conjunctiva pink and moist. Oropharynx clear. Dentition fair.  Neck: Not rigid. No sense of mass.  Nodes: None palpable.  Lungs: Diminished breath sound bilaterally without rales, wheezing or rhonchi  Heart: Regular rate and rhythm. No Murmur. No rub. No gallop. No palpable thrill.  Abdomen: Bowel sounds present and normoactive. Soft. Nondistended. Nontender. No sense of mass. No organomegaly.  Extremities: No cyanosis or clubbing. No edema.   Skin: Warm. Dry. Good turgor. No rash. No vasculitic stigmata.  Psychiatric: Appropriate affect. Less anxiousl.     Laboratory Studies--  CBC                        12.2   8.9   )-----------( 330      ( 22 Nov 2017 06:11 )             37.9       Chemistries  11-22    141  |  104  |  5<L>  ----------------------------<  87  4.3   |  31  |  0.88    Ca    8.8      22 Nov 2017 06:11        Culture Data  Culture - Fungal, Bronchial (11.20.17 @ 22:25)    Specimen Source: .Broncial Bronchoalveolar Washings    Culture Results:   Testing in progress    Culture - Bronchial (11.20.17 @ 22:25)    Gram Stain:   Numerous polymorphonuclear leukocytes per low power field  Rare Squamous epithelial cells per low power field  Rare Gram Variable Rods per oil power field  Numerous Gram Positive Cocci in Pairs and Chains per oil power field    Specimen Source: .Broncial Bronchoalveolar Washings    Culture Results:   Numerous Streptococcus pneumoniae  Normal Respiratory [place] present    Culture - Acid Fast - Bronchial w/Smear (11.20.17 @ 22:25)    Specimen Source: .Broncial Bronchoalveolar Washings    Acid Fast Bacilli Smear:   No acid fast bacilli seen by fluorochrome stain    Culture - Blood (11.17.17 @ 23:01)    Specimen Source: .Blood Blood-Peripheral    Culture Results:   No growth to date.    Culture - Blood (11.17.17 @ 23:01)    Specimen Source: .Blood Blood-Peripheral    Culture Results:   No growth to date.

## 2017-11-22 NOTE — PROGRESS NOTE ADULT - ASSESSMENT
Not 100% clear to me that the S. pneumoniae represents a pathogen here.  Question whether secretions are more due to poor mucociliary clearance than active infection  Patient received antibiotics which would have been effective against the majority of S. pneumoniae strains.  I see no need for continued inpatient care here.    Suggestions--  F/U with PCP  F/U with pulmonary  Doxycycline 100mg PO Q12h x7 days  Patient may follow up with me as needed.  Thank you for the courtesy of this referral.  D/W Dr. Voss.  I'll sign off at this time.     Morales Garrido MD  [phone]

## 2017-11-22 NOTE — PROGRESS NOTE ADULT - PROBLEM SELECTOR PLAN 2
Atypical, likely 2/2 tightness from cough/asthma symptoms  -Dr. Dominguez consulted, appreciate recs  -echo negative; see report

## 2017-11-22 NOTE — PROGRESS NOTE ADULT - SUBJECTIVE AND OBJECTIVE BOX
Date/Time Patient Seen:  		  Referring MD:   Data Reviewed	       Patient is a 42y old  Female who presents with a chief complaint of SOB cough (20 Nov 2017 15:00)  in bed  seen and examined  vs and meds reviewed      Subjective/HPI     PAST MEDICAL & SURGICAL HISTORY:  GERD (gastroesophageal reflux disease)  Anxiety  Asthma  Crohn's disease  OCD (obsessive compulsive disorder)  No significant past surgical history        Medication list         MEDICATIONS  (STANDING):  ALBUTerol    0.083% 2.5 milliGRAM(s) Nebulizer every 8 hours  ALPRAZolam 0.5 milliGRAM(s) Oral at bedtime  buDESOnide  80 MICROgram(s)/formoterol 4.5 MICROgram(s) Inhaler 2 Puff(s) Inhalation two times a day  DULoxetine 60 milliGRAM(s) Oral daily  loratadine 10 milliGRAM(s) Oral daily  mercaptopurine 75 milliGRAM(s) Oral daily  pantoprazole    Tablet 40 milliGRAM(s) Oral before breakfast  polyethylene glycol 3350 17 Gram(s) Oral daily  pseudoephedrine Solution 15 milliGRAM(s) Oral three times a day  sodium chloride 0.65% Nasal 1 Spray(s) Both Nostrils three times a day    MEDICATIONS  (PRN):  acetaminophen   Tablet. 650 milliGRAM(s) Oral every 6 hours PRN Mild Pain (1 - 3)  ALBUTerol    90 MICROgram(s) HFA Inhaler 2 Puff(s) Inhalation every 6 hours PRN Shortness of Breath and/or Wheezing  ALPRAZolam 0.5 milliGRAM(s) Oral daily PRN anxiety  benzonatate 100 milliGRAM(s) Oral every 8 hours PRN Cough  guaiFENesin    Syrup 200 milliGRAM(s) Oral every 6 hours PRN Cough  oxyCODONE    5 mG/acetaminophen 325 mG 1 Tablet(s) Oral every 4 hours PRN Moderate Pain (4 - 6)         Vitals log        ICU Vital Signs Last 24 Hrs  T(C): 36.5 (22 Nov 2017 05:30), Max: 36.9 (21 Nov 2017 21:48)  T(F): 97.7 (22 Nov 2017 05:30), Max: 98.5 (21 Nov 2017 21:48)  HR: 94 (22 Nov 2017 05:30) (92 - 108)  BP: 112/80 (22 Nov 2017 05:30) (110/80 - 114/77)  BP(mean): --  ABP: --  ABP(mean): --  RR: 17 (22 Nov 2017 05:30) (17 - 17)  SpO2: 96% (22 Nov 2017 05:30) (95% - 96%)           Input and Output:  I&O's Detail    20 Nov 2017 07:01  -  21 Nov 2017 07:00  --------------------------------------------------------  IN:    Oral Fluid: 240 mL  Total IN: 240 mL    OUT:  Total OUT: 0 mL    Total NET: 240 mL      21 Nov 2017 07:01  -  22 Nov 2017 06:12  --------------------------------------------------------  IN:    Oral Fluid: 600 mL  Total IN: 600 mL    OUT:  Total OUT: 0 mL    Total NET: 600 mL          Lab Data                        12.3   8.6   )-----------( 305      ( 20 Nov 2017 06:37 )             37.8     11-20    140  |  104  |  7   ----------------------------<  79  4.3   |  30  |  0.86    Ca    8.9      20 Nov 2017 07:15              Review of Systems	      Objective     Physical Examination    head at  heart - s1s2  lungs - dec BS  abd - soft  cn grossly int  occ cough      Pertinent Lab findings & Imaging      Erin:  NO   Adequate UO     I&O's Detail    20 Nov 2017 07:01  -  21 Nov 2017 07:00  --------------------------------------------------------  IN:    Oral Fluid: 240 mL  Total IN: 240 mL    OUT:  Total OUT: 0 mL    Total NET: 240 mL      21 Nov 2017 07:01  -  22 Nov 2017 06:12  --------------------------------------------------------  IN:    Oral Fluid: 600 mL  Total IN: 600 mL    OUT:  Total OUT: 0 mL    Total NET: 600 mL               Discussed with:     Cultures:	        Radiology

## 2017-11-22 NOTE — PROGRESS NOTE ADULT - PROBLEM SELECTOR PLAN 2
ct chest reviewed  bronch panel pending in full report  path pending  ID following  ABX deferred at present  cont NEBS, inhaler, and tx for upper airway cough regimen  ambulate

## 2017-11-22 NOTE — PROGRESS NOTE ADULT - PROBLEM SELECTOR PLAN 4
on 6 MP  monitor sx  immunosuppressed
- resolved.  - Possibly 2/2 contact with pillow  -Non-pruritic/non-painful, will monitor
- resolved.  - Possibly 2/2 contact with pillow  -Non-pruritic/non-painful, will monitor
Possibly 2/2 contact with pillow  -Non-pruritic/non-painful, will monitor

## 2017-11-22 NOTE — PROGRESS NOTE ADULT - PROBLEM SELECTOR PLAN 3
CT chest reviewed  GGO and tree in bud  SOB and GOLD  hold off on ABX, procalcitonin neg, crp neg,   cont proventil PRN, cough regimen, keep sat > 88 pct  chest pt as needed  increase activity  Bronchoscopy for Monday  will follow
anxiolytics  reassurance
immunosuppressed - on 6MP  will need ID eval for poss LRTI
on 6MP  monitor sx
-Continue Proventil, Symbicort  -Carolesen and Aris arnold prn for cough

## 2017-11-22 NOTE — PROGRESS NOTE ADULT - PROBLEM SELECTOR PROBLEM 1
GERD (gastroesophageal reflux disease)
SOB (shortness of breath)

## 2017-11-22 NOTE — PROGRESS NOTE ADULT - ATTENDING COMMENTS
pt seen and dw housestaff.  bronchiolitis, follow up broch results.  dw ID- possibly post infectious.  defer further abx for now pending results
pt seen w pgy2 and case discussed. for bronch today. no medical contraindications to proposed procedure
pt seen examined agree w above see full note a/p in dc note document

## 2017-11-22 NOTE — PROGRESS NOTE ADULT - PROBLEM SELECTOR PLAN 1
-Dr. Voss consulted, appreciate recs  -Broncoscopy 11/20: culture + respiratory aquilino  - ID, Dr. Garrido recommends doxycycline and clears for d/c  -Proventil and Symbicort prn  -Robitussen and Aris arnold prn for cough  -Hold steroids and antibiotic as per pulmonary  -IVF  -Ocean spray  - blood and urine cx negative to date  - ID consult, recs appreciated -Dr. Voss consulted, appreciate recs  - ID, Dr. Garrido recommends doxycycline and cleared for d/c home  -Proventil and Symbicort  -Geovanna and Aris arnold prn for cough  -Hold steroids and antibiotic as per pulmonary  -IVF  -Ocean spray  - blood and urine cx negative to date  - ID consult, recs appreciated

## 2017-11-22 NOTE — PROGRESS NOTE ADULT - PROBLEM SELECTOR PROBLEM 2
Crohn's disease
Lower respiratory tract finding
Chest pain, unspecified type

## 2017-11-22 NOTE — PROGRESS NOTE ADULT - PROBLEM SELECTOR PROBLEM 3
Anxiety
Crohn's disease
Crohn's disease
Lower respiratory tract finding
Asthma

## 2017-11-22 NOTE — PROGRESS NOTE ADULT - SUBJECTIVE AND OBJECTIVE BOX
Patient is a 42y old  Female who presents with a chief complaint of SOB cough (20 Nov 2017 15:00)      INTERVAL HPI/OVERNIGHT EVENTS:  Patient reports that overnight she refused breathing treatment b/c she was tired, and she noticed some worsening cough, but that it was significantly less productive.  She reports that she is less out of breath and able to walk further down the [place] with her .  Patient reports that the pressure in her right ear continues and that she is not hearing very well out of that ear.  She denies ear pain, headache, sore throat, neck stiffness, difficulty swallowing, chest pain, heart palpitations, n/v/d/c, abdominal pain, weakness, numbness or tingling.    MEDICATIONS  (STANDING):  ALBUTerol    0.083% 2.5 milliGRAM(s) Nebulizer every 8 hours  ALPRAZolam 0.5 milliGRAM(s) Oral at bedtime  buDESOnide  80 MICROgram(s)/formoterol 4.5 MICROgram(s) Inhaler 2 Puff(s) Inhalation two times a day  DULoxetine 60 milliGRAM(s) Oral daily  loratadine 10 milliGRAM(s) Oral daily  mercaptopurine 75 milliGRAM(s) Oral daily  pantoprazole    Tablet 40 milliGRAM(s) Oral before breakfast  polyethylene glycol 3350 17 Gram(s) Oral daily  pseudoephedrine Solution 15 milliGRAM(s) Oral three times a day  sodium chloride 0.65% Nasal 1 Spray(s) Both Nostrils three times a day    MEDICATIONS  (PRN):  acetaminophen   Tablet. 650 milliGRAM(s) Oral every 6 hours PRN Mild Pain (1 - 3)  ALBUTerol    90 MICROgram(s) HFA Inhaler 2 Puff(s) Inhalation every 6 hours PRN Shortness of Breath and/or Wheezing  ALPRAZolam 0.5 milliGRAM(s) Oral daily PRN anxiety  benzonatate 100 milliGRAM(s) Oral every 8 hours PRN Cough  guaiFENesin    Syrup 200 milliGRAM(s) Oral every 6 hours PRN Cough  oxyCODONE    5 mG/acetaminophen 325 mG 1 Tablet(s) Oral every 4 hours PRN Moderate Pain (4 - 6)      Allergies    Demerol HCl (Rash)    Intolerances        REVIEW OF SYSTEMS:  CONSTITUTIONAL: No fever, No chills,No fatigue,No myalgia,No Body ache  EYES: No eye pain, visual disturbances, or discharge  ENMT:  + ear pressure/decreased hearing on L, No ear pain, No nose bleed, No vertigo; No sinus or throat pain  NECK: No pain, No stiffness  RESPIRATORY: + shortness of breath, cough; No wheezing, No  hemoptysis;  CARDIOVASCULAR: No chest pain, palpitations, leg swelling  GASTROINTESTINAL: No abdominal or epigastric pain. No nausea, No vomiting; No diarrhea or constipation. [ ] BM  GENITOURINARY: No dysuria, No frequency, No urgency, No hematuria, or incontinence  NEUROLOGICAL: alert and oriented x 3,  No headaches, No dizziness, No numbness,  SKIN:   No itching, burning, rashes, or lesions   MUSCULOSKELETAL: No joint pain or swelling; No muscle pain, No back pain, No extremity pain  PSYCHIATRIC: No depression, anxiety, mood swings, or difficulty sleeping  ROS  [ ] Unable to obtain   REST OF REVIEW Of SYSTEM - [ ] Normal     Height (cm): 167.64 (11-17 @ 14:40), 167.64 (11-10 @ 09:35)  Weight (kg): 81.6 (11-17 @ 14:40), 84.4 (11-10 @ 09:35)  BMI (kg/m2): 29 (11-17 @ 14:40), 30 (11-10 @ 09:35)  BSA (m2): 1.91 (11-17 @ 14:40), 1.94 (11-10 @ 09:35)  Vital Signs Last 24 Hrs  T(C): 36.5 (22 Nov 2017 05:30), Max: 36.9 (21 Nov 2017 21:48)  T(F): 97.7 (22 Nov 2017 05:30), Max: 98.5 (21 Nov 2017 21:48)  HR: 94 (22 Nov 2017 05:30) (92 - 108)  BP: 112/80 (22 Nov 2017 05:30) (110/80 - 114/77)  BP(mean): --  RR: 17 (22 Nov 2017 05:30) (17 - 17)  SpO2: 96% (22 Nov 2017 05:30) (95% - 96%)  [ ] room air   [ ] 02    PHYSICAL EXAM:  GENERAL:  No acute distresss,  [ ] Agitated, [ ] Lethargy, [ ] confused   HEAD:  normal  ENMT: tympanic erythema  NECK:  normal    NERVOUS SYSTEM:  Alert & Oriented X3, no focal deficits  CHEST/LUNG: Clear to auscultation bilaterally,    [- ] wheezing   [- ] rhonchi  [- ] crackles  HEART:  Regular rate and rhythm, No murmurs, rubs, or gallops  ABDOMEN:  soft, nontender, nondistended, positive bowel sounds  EXTREMITIES: No clubbing, cyanosis or edema  SKIN: [- ] venous stasis skin changes    LABS:                        12.2   8.9   )-----------( 330      ( 22 Nov 2017 06:11 )             37.9     22 Nov 2017 06:11    141    |  104    |  5      ----------------------------<  87     4.3     |  31     |  0.88     Ca    8.8        22 Nov 2017 06:11            CAPILLARY BLOOD GLUCOSE        Cultures  Culture Results:   Numerous Streptococcus pneumoniae  Normal Respiratory [place] present (11-20 @ 22:25)  Culture Results:   Testing in progress (11-20 @ 22:25)  Culture Results:   No growth to date. (11-17 @ 23:01)  Culture Results:   No growth to date. (11-17 @ 23:01)      culture blood  -- .Broncial Bronchoalveolar Washings 11-20 @ 22:25    culture urine  --  11-20 @ 22:25      RADIOLOGY & ADDITIONAL TESTS:      Care Discussed with [X] Consultants  [ ] Patient  [ ] Family  [X]   /   [ ] Other; RN  DVT prophylaxis [ ] lovenox   [ ] subq heparin  [ ] coumadin  [ ] venodynes [ ] ambulating frequently at how risk for vte and no pharm         or  mechanical prophylaxis required    [ ] other   Advanced directive:    [ ]pt has hcp     [ ] pt declined to assign hcp  Discussed with pt @ bedside Patient is a 42y old  Female who presents with a chief complaint of SOB cough (20 Nov 2017 15:00)      INTERVAL HPI/OVERNIGHT EVENTS:  Patient reports that overnight she refused breathing treatment b/c she was tired, and she noticed some worsening cough, but that it was significantly less productive.  She reports that she is less out of breath and able to walk further down the [place] with her .  Patient reports that the pressure in her right ear continues and that she is not hearing very well out of that ear.  She denies ear pain, headache, sore throat, neck stiffness, difficulty swallowing, chest pain, heart palpitations, n/v/d/c, abdominal pain, weakness, numbness or tingling.    MEDICATIONS  (STANDING):  ALBUTerol    0.083% 2.5 milliGRAM(s) Nebulizer every 8 hours  ALPRAZolam 0.5 milliGRAM(s) Oral at bedtime  buDESOnide  80 MICROgram(s)/formoterol 4.5 MICROgram(s) Inhaler 2 Puff(s) Inhalation two times a day  DULoxetine 60 milliGRAM(s) Oral daily  loratadine 10 milliGRAM(s) Oral daily  mercaptopurine 75 milliGRAM(s) Oral daily  pantoprazole    Tablet 40 milliGRAM(s) Oral before breakfast  polyethylene glycol 3350 17 Gram(s) Oral daily  pseudoephedrine Solution 15 milliGRAM(s) Oral three times a day  sodium chloride 0.65% Nasal 1 Spray(s) Both Nostrils three times a day    MEDICATIONS  (PRN):  acetaminophen   Tablet. 650 milliGRAM(s) Oral every 6 hours PRN Mild Pain (1 - 3)  ALBUTerol    90 MICROgram(s) HFA Inhaler 2 Puff(s) Inhalation every 6 hours PRN Shortness of Breath and/or Wheezing  ALPRAZolam 0.5 milliGRAM(s) Oral daily PRN anxiety  benzonatate 100 milliGRAM(s) Oral every 8 hours PRN Cough  guaiFENesin    Syrup 200 milliGRAM(s) Oral every 6 hours PRN Cough  oxyCODONE    5 mG/acetaminophen 325 mG 1 Tablet(s) Oral every 4 hours PRN Moderate Pain (4 - 6)      Allergies    Demerol HCl (Rash)    Intolerances        REVIEW OF SYSTEMS:  CONSTITUTIONAL: No fever, No chills,No fatigue,No myalgia,No Body ache  EYES: No eye pain, visual disturbances, or discharge  ENMT:  + ear pressure/decreased hearing on L, No ear pain, No nose bleed, No vertigo; No sinus or throat pain  NECK: No pain, No stiffness  RESPIRATORY: + shortness of breath, cough; No wheezing, No  hemoptysis;  CARDIOVASCULAR: No chest pain, palpitations, leg swelling  GASTROINTESTINAL: No abdominal or epigastric pain. No nausea, No vomiting; No diarrhea or constipation. [ ] BM  GENITOURINARY: No dysuria, No frequency, No urgency, No hematuria, or incontinence  NEUROLOGICAL: alert and oriented x 3,  No headaches, No dizziness, No numbness,  SKIN:   No itching, burning, rashes, or lesions   MUSCULOSKELETAL: No joint pain or swelling; No muscle pain, No back pain, No extremity pain  PSYCHIATRIC: No depression, anxiety, mood swings, or difficulty sleeping  ROS  [ ] Unable to obtain   REST OF REVIEW Of SYSTEM - [ ] Normal     Height (cm): 167.64 (11-17 @ 14:40), 167.64 (11-10 @ 09:35)  Weight (kg): 81.6 (11-17 @ 14:40), 84.4 (11-10 @ 09:35)  BMI (kg/m2): 29 (11-17 @ 14:40), 30 (11-10 @ 09:35)  BSA (m2): 1.91 (11-17 @ 14:40), 1.94 (11-10 @ 09:35)  Vital Signs Last 24 Hrs  T(C): 36.5 (22 Nov 2017 05:30), Max: 36.9 (21 Nov 2017 21:48)  T(F): 97.7 (22 Nov 2017 05:30), Max: 98.5 (21 Nov 2017 21:48)  HR: 94 (22 Nov 2017 05:30) (92 - 108)  BP: 112/80 (22 Nov 2017 05:30) (110/80 - 114/77)  BP(mean): --  RR: 17 (22 Nov 2017 05:30) (17 - 17)  SpO2: 96% (22 Nov 2017 05:30) (95% - 96%)  [ x] room air   [ ] 02    PHYSICAL EXAM:  GENERAL:  No acute distresss,  [ ] Agitated, [ ] Lethargy, [ ] confused   HEAD:  normal  ENMT: tympanic erythema  NECK:  normal    NERVOUS SYSTEM:  Alert & Oriented X3, no focal deficits  CHEST/LUNG: Clear to auscultation bilaterally,    [- ] wheezing   [- ] rhonchi  [- ] crackles  HEART:  Regular rate and rhythm, No murmurs, rubs, or gallops  ABDOMEN:  soft, nontender, nondistended, positive bowel sounds  EXTREMITIES: No clubbing, cyanosis or edema  SKIN: [- ] venous stasis skin changes    LABS:                        12.2   8.9   )-----------( 330      ( 22 Nov 2017 06:11 )             37.9     22 Nov 2017 06:11    141    |  104    |  5      ----------------------------<  87     4.3     |  31     |  0.88     Ca    8.8        22 Nov 2017 06:11            CAPILLARY BLOOD GLUCOSE        Cultures  Culture Results:   Numerous Streptococcus pneumoniae  Normal Respiratory [place] present (11-20 @ 22:25)  Culture Results:   Testing in progress (11-20 @ 22:25)  Culture Results:   No growth to date. (11-17 @ 23:01)  Culture Results:   No growth to date. (11-17 @ 23:01)      culture blood  -- .Broncial Bronchoalveolar Washings 11-20 @ 22:25    culture urine  --  11-20 @ 22:25      RADIOLOGY & ADDITIONAL TESTS:      Care Discussed with [X] Consultants  [ ] Patient  [ ] Family  [X]   /   [ ] Other; RN  DVT prophylaxis [ ] lovenox   [ ] subq heparin  [ ] coumadin  [ ] venodynes [ x] ambulating frequently at how risk for vte and no pharm         or  mechanical prophylaxis required    [ ] other     Discussed with pt @ bedside

## 2017-11-29 LAB — GALACTOMANNAN AG SERPL-ACNC: SIGNIFICANT CHANGE UP

## 2017-12-21 LAB
CULTURE RESULTS: SIGNIFICANT CHANGE UP
SPECIMEN SOURCE: SIGNIFICANT CHANGE UP

## 2018-01-10 LAB
CULTURE RESULTS: SIGNIFICANT CHANGE UP
SPECIMEN SOURCE: SIGNIFICANT CHANGE UP

## 2018-01-26 ENCOUNTER — EMERGENCY (EMERGENCY)
Facility: HOSPITAL | Age: 43
LOS: 1 days | Discharge: HOME | End: 2018-01-26
Attending: EMERGENCY MEDICINE | Admitting: EMERGENCY MEDICINE
Payer: COMMERCIAL

## 2018-01-26 VITALS
SYSTOLIC BLOOD PRESSURE: 98 MMHG | OXYGEN SATURATION: 96 % | RESPIRATION RATE: 18 BRPM | DIASTOLIC BLOOD PRESSURE: 63 MMHG | TEMPERATURE: 98 F | HEART RATE: 92 BPM

## 2018-01-26 VITALS
HEIGHT: 66 IN | DIASTOLIC BLOOD PRESSURE: 86 MMHG | RESPIRATION RATE: 18 BRPM | SYSTOLIC BLOOD PRESSURE: 126 MMHG | TEMPERATURE: 99 F | OXYGEN SATURATION: 98 % | HEART RATE: 97 BPM | WEIGHT: 190.04 LBS

## 2018-01-26 LAB
ALBUMIN SERPL ELPH-MCNC: 3.4 G/DL — SIGNIFICANT CHANGE UP (ref 3.3–5)
ALP SERPL-CCNC: 73 U/L — SIGNIFICANT CHANGE UP (ref 40–120)
ALT FLD-CCNC: 30 U/L — SIGNIFICANT CHANGE UP (ref 12–78)
AMYLASE P1 CFR SERPL: 81 U/L — SIGNIFICANT CHANGE UP (ref 25–115)
ANION GAP SERPL CALC-SCNC: 6 MMOL/L — SIGNIFICANT CHANGE UP (ref 5–17)
AST SERPL-CCNC: 21 U/L — SIGNIFICANT CHANGE UP (ref 15–37)
BASOPHILS # BLD AUTO: 0.1 K/UL — SIGNIFICANT CHANGE UP (ref 0–0.2)
BASOPHILS NFR BLD AUTO: 0.8 % — SIGNIFICANT CHANGE UP (ref 0–2)
BILIRUB SERPL-MCNC: 0.4 MG/DL — SIGNIFICANT CHANGE UP (ref 0.2–1.2)
BUN SERPL-MCNC: 9 MG/DL — SIGNIFICANT CHANGE UP (ref 7–23)
CALCIUM SERPL-MCNC: 8.3 MG/DL — LOW (ref 8.5–10.1)
CHLORIDE SERPL-SCNC: 107 MMOL/L — SIGNIFICANT CHANGE UP (ref 96–108)
CK MB BLD-MCNC: <0.7 % — SIGNIFICANT CHANGE UP (ref 0–3.5)
CK MB CFR SERPL CALC: <0.5 NG/ML — SIGNIFICANT CHANGE UP (ref 0–3.6)
CK SERPL-CCNC: 74 U/L — SIGNIFICANT CHANGE UP (ref 26–192)
CO2 SERPL-SCNC: 28 MMOL/L — SIGNIFICANT CHANGE UP (ref 22–31)
CREAT SERPL-MCNC: 0.89 MG/DL — SIGNIFICANT CHANGE UP (ref 0.5–1.3)
D DIMER BLD IA.RAPID-MCNC: 162 NG/ML DDU — SIGNIFICANT CHANGE UP
EOSINOPHIL # BLD AUTO: 0.1 K/UL — SIGNIFICANT CHANGE UP (ref 0–0.5)
EOSINOPHIL NFR BLD AUTO: 1.6 % — SIGNIFICANT CHANGE UP (ref 0–6)
GLUCOSE SERPL-MCNC: 84 MG/DL — SIGNIFICANT CHANGE UP (ref 70–99)
HCG SERPL-ACNC: <1 MIU/ML — SIGNIFICANT CHANGE UP
HCT VFR BLD CALC: 36.5 % — SIGNIFICANT CHANGE UP (ref 34.5–45)
HGB BLD-MCNC: 11.7 G/DL — SIGNIFICANT CHANGE UP (ref 11.5–15.5)
LIDOCAIN IGE QN: 176 U/L — SIGNIFICANT CHANGE UP (ref 73–393)
LYMPHOCYTES # BLD AUTO: 1.1 K/UL — SIGNIFICANT CHANGE UP (ref 1–3.3)
LYMPHOCYTES # BLD AUTO: 13.6 % — SIGNIFICANT CHANGE UP (ref 13–44)
MCHC RBC-ENTMCNC: 28.4 PG — SIGNIFICANT CHANGE UP (ref 27–34)
MCHC RBC-ENTMCNC: 32.1 GM/DL — SIGNIFICANT CHANGE UP (ref 32–36)
MCV RBC AUTO: 88.3 FL — SIGNIFICANT CHANGE UP (ref 80–100)
MONOCYTES # BLD AUTO: 0.6 K/UL — SIGNIFICANT CHANGE UP (ref 0–0.9)
MONOCYTES NFR BLD AUTO: 7.1 % — SIGNIFICANT CHANGE UP (ref 1–9)
NEUTROPHILS # BLD AUTO: 6.4 K/UL — SIGNIFICANT CHANGE UP (ref 1.8–7.4)
NEUTROPHILS NFR BLD AUTO: 76.9 % — SIGNIFICANT CHANGE UP (ref 43–77)
NT-PROBNP SERPL-SCNC: 20 PG/ML — SIGNIFICANT CHANGE UP (ref 0–125)
PLATELET # BLD AUTO: 304 K/UL — SIGNIFICANT CHANGE UP (ref 150–400)
POTASSIUM SERPL-MCNC: 3.9 MMOL/L — SIGNIFICANT CHANGE UP (ref 3.5–5.3)
POTASSIUM SERPL-SCNC: 3.9 MMOL/L — SIGNIFICANT CHANGE UP (ref 3.5–5.3)
PROCALCITONIN SERPL-MCNC: <0.05 — SIGNIFICANT CHANGE UP (ref 0–0.04)
PROT SERPL-MCNC: 7 G/DL — SIGNIFICANT CHANGE UP (ref 6–8.3)
RAPID RVP RESULT: SIGNIFICANT CHANGE UP
RBC # BLD: 4.14 M/UL — SIGNIFICANT CHANGE UP (ref 3.8–5.2)
RBC # FLD: 14.5 % — SIGNIFICANT CHANGE UP (ref 10.3–14.5)
SODIUM SERPL-SCNC: 141 MMOL/L — SIGNIFICANT CHANGE UP (ref 135–145)
TROPONIN I SERPL-MCNC: <.015 NG/ML — SIGNIFICANT CHANGE UP (ref 0.01–0.04)
WBC # BLD: 8.3 K/UL — SIGNIFICANT CHANGE UP (ref 3.8–10.5)
WBC # FLD AUTO: 8.3 K/UL — SIGNIFICANT CHANGE UP (ref 3.8–10.5)

## 2018-01-26 PROCEDURE — 85379 FIBRIN DEGRADATION QUANT: CPT

## 2018-01-26 PROCEDURE — 82550 ASSAY OF CK (CPK): CPT

## 2018-01-26 PROCEDURE — 71275 CT ANGIOGRAPHY CHEST: CPT

## 2018-01-26 PROCEDURE — 71045 X-RAY EXAM CHEST 1 VIEW: CPT | Mod: 26

## 2018-01-26 PROCEDURE — 99284 EMERGENCY DEPT VISIT MOD MDM: CPT | Mod: 25

## 2018-01-26 PROCEDURE — 93005 ELECTROCARDIOGRAM TRACING: CPT

## 2018-01-26 PROCEDURE — 85027 COMPLETE CBC AUTOMATED: CPT

## 2018-01-26 PROCEDURE — 84484 ASSAY OF TROPONIN QUANT: CPT

## 2018-01-26 PROCEDURE — 83690 ASSAY OF LIPASE: CPT

## 2018-01-26 PROCEDURE — 84145 PROCALCITONIN (PCT): CPT

## 2018-01-26 PROCEDURE — 83880 ASSAY OF NATRIURETIC PEPTIDE: CPT

## 2018-01-26 PROCEDURE — 84702 CHORIONIC GONADOTROPIN TEST: CPT

## 2018-01-26 PROCEDURE — 71275 CT ANGIOGRAPHY CHEST: CPT | Mod: 26

## 2018-01-26 PROCEDURE — 87633 RESP VIRUS 12-25 TARGETS: CPT

## 2018-01-26 PROCEDURE — 71045 X-RAY EXAM CHEST 1 VIEW: CPT

## 2018-01-26 PROCEDURE — 82553 CREATINE MB FRACTION: CPT

## 2018-01-26 PROCEDURE — 87486 CHLMYD PNEUM DNA AMP PROBE: CPT

## 2018-01-26 PROCEDURE — 80053 COMPREHEN METABOLIC PANEL: CPT

## 2018-01-26 PROCEDURE — 99285 EMERGENCY DEPT VISIT HI MDM: CPT

## 2018-01-26 PROCEDURE — 82150 ASSAY OF AMYLASE: CPT

## 2018-01-26 PROCEDURE — 87798 DETECT AGENT NOS DNA AMP: CPT

## 2018-01-26 PROCEDURE — 96374 THER/PROPH/DIAG INJ IV PUSH: CPT | Mod: XU

## 2018-01-26 PROCEDURE — 87581 M.PNEUMON DNA AMP PROBE: CPT

## 2018-01-26 RX ORDER — SODIUM CHLORIDE 9 MG/ML
1000 INJECTION INTRAMUSCULAR; INTRAVENOUS; SUBCUTANEOUS ONCE
Qty: 0 | Refills: 0 | Status: COMPLETED | OUTPATIENT
Start: 2018-01-26 | End: 2018-01-26

## 2018-01-26 RX ORDER — SODIUM CHLORIDE 9 MG/ML
3 INJECTION INTRAMUSCULAR; INTRAVENOUS; SUBCUTANEOUS ONCE
Qty: 0 | Refills: 0 | Status: COMPLETED | OUTPATIENT
Start: 2018-01-26 | End: 2018-01-26

## 2018-01-26 RX ORDER — KETOROLAC TROMETHAMINE 30 MG/ML
30 SYRINGE (ML) INJECTION ONCE
Qty: 0 | Refills: 0 | Status: DISCONTINUED | OUTPATIENT
Start: 2018-01-26 | End: 2018-01-26

## 2018-01-26 RX ADMIN — SODIUM CHLORIDE 3 MILLILITER(S): 9 INJECTION INTRAMUSCULAR; INTRAVENOUS; SUBCUTANEOUS at 09:59

## 2018-01-26 RX ADMIN — SODIUM CHLORIDE 1000 MILLILITER(S): 9 INJECTION INTRAMUSCULAR; INTRAVENOUS; SUBCUTANEOUS at 10:25

## 2018-01-26 RX ADMIN — Medication 30 MILLIGRAM(S): at 10:27

## 2018-01-26 NOTE — ED ADULT NURSE NOTE - OBJECTIVE STATEMENT
pt reports CP x2days, recent hospitalizations for the same dx w/ pneumonia. states went to  & was told d-dimer elevated to be seen in ED to r/o PE. c/o CP + SHOB w/ increase pain during inspiration. denies pain elsewhere, denies NVD. A+Ox4. VSS

## 2018-01-26 NOTE — PROGRESS NOTE ADULT - SUBJECTIVE AND OBJECTIVE BOX
[facility] Cardiology    CHIEF COMPLAINT: Patient is a 42y old  Female who presents with a chief complaint of CP    HPI: 42 F PMH below seen in our office yesterday with SSCP, keeping her up at night. Positive dyspnea. D - Dimer yesterday was drawn in office and mildly positive. Sent to ER for further w/u. Repeat D-Dimer Here neg. EKG NSR 98 bpm, nl axis no stt changes. Pt s/p torasol here with moderate relief of CP. Hx of normal CCTA 1 year ago.      PAST MEDICAL & SURGICAL HISTORY:  GERD (gastroesophageal reflux disease)  Anxiety  Asthma  Crohn's disease  OCD (obsessive compulsive disorder)  No significant past surgical history    SOCIAL HISTORY: no tobacco    FAMILY HISTORY:  No pertinent family history in first degree relatives      MEDICATIONS  (STANDING):    MEDICATIONS  (PRN):      Allergies    Demerol HCl (Rash)    Intolerances      REVIEW OF SYSTEMS:  CONSTITUTIONAL: No weakness, no fevers   EYES/ENT: No visual changes  NECK: pain or stiffness  RESPIRATORY:  shortness of breath  CARDIOVASCULAR: chest pain or palpitations  GASTROINTESTINAL: No abdominal pain  GENITOURINARY: No hematuria  NEUROLOGICAL: No weakness  SKIN: No rash  All other review of systems is negative unless indicated above    VITAL SIGNS:   Vital Signs Last 24 Hrs  T(C): 37.3 (26 Jan 2018 09:10), Max: 37.3 (26 Jan 2018 09:10)  T(F): 99.2 (26 Jan 2018 09:10), Max: 99.2 (26 Jan 2018 09:10)  HR: 97 (26 Jan 2018 09:10) (97 - 97)  BP: 126/86 (26 Jan 2018 09:10) (126/86 - 126/86)  BP(mean): --  RR: 18 (26 Jan 2018 09:10) (18 - 18)  SpO2: 98% (26 Jan 2018 09:10) (98% - 98%)  I&O's Summary    PHYSICAL EXAM:  Constitutional: NAD  Neurological: Alert and oriented, anxious  HEENT: EOMI, no JVD  Cardiovascular: S1 and S2, reg, no murmurs  Pulmonary: breath sounds bilaterally, CL  Gastrointestinal: Bowel Sounds present, soft, nontender  Ext: peripheral edema, none  Skin: No rashes, No cyanosis.  Psych:  Mood & affect appropriate   LABS: All Labs Reviewed:                        11.7   8.3   )-----------( 304      ( 26 Jan 2018 10:25 )             36.5     01-26    141  |  107  |  9   ----------------------------<  84  3.9   |  28  |  0.89    Ca    8.3<L>      26 Jan 2018 10:25    TPro  7.0  /  Alb  3.4  /  TBili  0.4  /  DBili  x   /  AST  21  /  ALT  30  /  AlkPhos  73  01-26      CARDIAC MARKERS ( 26 Jan 2018 10:25 )  <.015 ng/mL / x     / 74 U/L / x     / <0.5 ng/mL      Blood Culture:   01-26 @ 10:25  Pro Bnp 20      HPI: 42 F PMH above and recent lung infection s/p bronch, seen in our office yesterday with SSCP, keeping her up at night. Positive dyspnea. D - Dimer yesterday was drawn in office and mildly positive. Sent to ER for further w/u. Repeat D-Dimer Here neg. EKG NSR 98 bpm, nl axis no stt changes. Pt s/p toradol here with moderate relief of CP. Hx of normal CCTA 1 year ago.    Trop neg. BNP neg. Doubt cardiac. Pt is requesting  CT scan of chest despite repeat d-dimer neg. I recommend pulmonary consultation, Dr. Loyola/[facility]illy group for further evaluation.

## 2018-01-26 NOTE — ED PROVIDER NOTE - CARE PLAN
Principal Discharge DX:	Nonspecific chest pain  Assessment and plan of treatment:	Return to the ED for any new or worsening symptoms  Take your medication as prescribed  Naproxen per label instructions as needed for pain   Advance activity as tolerated   Follow up with your pulmonary physician within the week  Secondary Diagnosis:	Pleurisy

## 2018-01-26 NOTE — ED PROVIDER NOTE - PLAN OF CARE
Return to the ED for any new or worsening symptoms  Take your medication as prescribed  Naproxen per label instructions as needed for pain   Advance activity as tolerated   Follow up with your pulmonary physician within the week

## 2018-01-26 NOTE — ED PROVIDER NOTE - OBJECTIVE STATEMENT
Pt is a 43 yo female who presents to the ED with a cc of chest pain.  PMHx of anxiety, asthma, Crohns, GERD, OCD.  Pt was admitted to the hospital this past November with pneumonia.  She underwent a bronchoscopy at that time and results were positive were Mycoplasma.  Pt was discharged home on po Doxycycline and completed the course on 11/29.  She reports that she was told that secondary to the severe pulmonary inflammation she would required weeks to months before she felt back to baseline.  She was off of work for several weeks and returned 3 weeks ago.  Pt reports that since returning to work she has had worsening of her chest pain with some associated SOB.  Pt reports that the pain is sharp and burning in nature and worse at night.  She further reports that she feels like her chest is tight.  Initially she thought that this was related to her lungs so she followed up with her pulomnary physician who gave her a steroid course and thought it may be reflux.  She then followed up with her GI physician who did not believe that this was related to reflux.  She again followed up with pulmonary who wrote for a second steroid course but she has not taken this.  Pt saw her cardiologist yesterday and had outpatient blood work which relieved an elevated d dimer.  Pt was sent to the ED for further work up.  Denies fever, chills, N/V/D/C, abd pain.  Pt had her flu vaccine this year Pt is a 43 yo female who presents to the ED with a cc of chest pain.  PMHx of anxiety, asthma, Crohns, GERD, OCD.  Pt was admitted to the hospital this past November with pneumonia.  She underwent a bronchoscopy at that time and results were positive were Mycoplasma.  Pt was discharged home on po Doxycycline and completed the course on 11/29.  She reports that she was told that secondary to the severe pulmonary inflammation she would required weeks to months before she felt back to baseline.  She was off of work for several weeks and returned 3 weeks ago.  Pt reports that since returning to work she has had worsening of her chest pain with some associated SOB.  Pt reports that the pain is sharp and burning in nature and worse at night.  She further reports that she feels like her chest is tight.  Initially she thought that this was related to her lungs so she followed up with her pulomnary physician who gave her a steroid course and thought it may be reflux.  She then followed up with her GI physician who did not believe that this was related to reflux.  She again followed up with pulmonary who wrote for a second steroid course but she has not taken this.  Pt saw her cardiologist yesterday and had outpatient blood work which relieved an elevated d dimer.  Pt was sent to the ED for further work up.  Denies fever, chills, N/V/D/C, abd pain.  Pt had her flu vaccine this year  ECHO 11/17: EF 60% trace MR, trace TR

## 2018-04-30 NOTE — ED ADULT NURSE NOTE - MODE OF DISCHARGE
Forms completed and faxed to number on authorization.  Patient informed.  Records sent to [employer] to be scanned.   Ambulatory

## 2018-08-01 ENCOUNTER — RESULT REVIEW (OUTPATIENT)
Age: 43
End: 2018-08-01

## 2018-11-30 NOTE — ED PROVIDER NOTE - CARDIAC, MLM
Message   Recorded as Task   Date: 09/21/2018 07:16 AM, Created By: Porsha Canela   Task Name: 4. Patient Message   Assigned To: VANNESA RAMIREZ   Regarding Patient: ELDA FLOWERS, Status: Active   Comment:    Porsha Canela - 21 Sep 2018 7:16 AM     Patient Preference Appointment Provider  The patient made a reference to the following symptom, declined appointment to be scheduled. Requested to send message to Provider:  Symptom:   Pain > MUSCLE ache or spasm   Action:  SCHEDULE ANYTIME  PCP:  VANNESA RAMIREZ MD    PATIENT MESSAGE TO PROVIDER:  Patient would like to receive a call back from Dr. Ramirez. Has a bad pain on her side.     CALLERS RELATIONSHIP TO PATIENT:    self  IF OTHER, NAME AND RELATIONSHIP:    n/a    BEST NUMBER TO BE CONTACTED:  [phone]    ALTERNATIVE PHONE NUMBER:   n/a      Turnaround time given to caller:    \"\"THIS MESSAGE WILL BE SENT TO  ,,, (state provider's first and last name) ,,,,, THE CLINICAL TEAM WILL RETURN YOUR CALL AS SOON AS THEY HAVE REVIEWED YOUR MESSAGE\"    READ BACK MESSAGE TO PATIENT   Janina Staley - 21 Sep 2018 8:32 AM     UNDELEGATED TASK   VANNESA RAMIREZ - 21 Sep 2018 10:42 AM     TASK EDITED  Discussed- fell several days ago. Severe pain  - left side. She refuses to come in. She should go to ER- refuses. She wants pain meds- told her I would not prescribe without visit.        Signatures   Electronically signed by : VANNESA RAMIREZ M.D.; Sep 21 2018 10:43AM CST    
Normal rate, regular rhythm.  Heart sounds S1, S2

## 2019-09-11 ENCOUNTER — RESULT REVIEW (OUTPATIENT)
Age: 44
End: 2019-09-11

## 2019-12-26 NOTE — PROGRESS NOTE ADULT - PROBLEM SELECTOR PROBLEM 4
OFFICE FOLLOW UP - Nephrology   Salud Espinal 64 y o  female MRN: [id number]    Encounter: [id number]        ASSESSMENT and PLAN:  Diagnoses and all orders for this visit:    28-year-old female with a past medical history of bipolar disorder type 2, stage 3 chronic kidney disease who presents for follow-up    Chronic kidney disease, stage IV  - creatinine fluctuate S and is anywhere between 1 9 in 2 4, estimated GFR between 22 and 28 mL/minute  - she is currently not on any nephrotoxic agent,  Was on lithium for several years  - renal cyst is stable in size October of 2019  -she is off her potassium supplementation  -blood pressures have been well controlled  -urine protein to creatinine ratios have been minimal  -she has had echogenic kidneys that have been small in size since at least 2016  -completed CKD education  -  status post AV fistula creation    Bipolar 2 disorder ([facility] [address] ), eating disorder  - she was on lithium in her 25s and may have some chronic interstitial nephritis associated with the she also has some mild hypernatremia  - she is currently being treated with Seroquel  -she is now following up with a psychiatrist and psychologist monthly, she does also have a history of an eating disorder    Cyst of right kidney  - she has had recent MRIs and CT scan that have been done serially most recently October of 2009    Secondary renal hyperparathyroidism [facility])  - she has a PTH around 125, we have discontinued her vitamin-D and calcium acid a for a rising calcium    Proteinuria  - she has a positive urine protein to creatinine ratio but a negative urinalysis she is  mildly anemic with CKD    She was on lithium several years ago she also has had acute kidney injury from chronic lactulose  Use  -  limited serologic workup was negative last urine protein to creatinine ratio was negligible    hypertension  -she is now on carvedilol 3 125 mg twice daily,  and amiloride 5 mg twice daily-(in the setting of DI
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 related to her chronic lithium use in the past her serum sodiums did go to 153  -increased Amiloride to 5 mg twice daily and her serum sodium has improved continue to monitor now monthly monitor calcium and potassium    Elevated LFTs  -LFTs are stable  -continue statin    Given her stability will continue to follow up every 3 months S and repeat blood work every other    HPI: Shaina Meehan is a 64 y o  female who is here for Follow-up    She is currently doing okay in the interim she was recently diagnosed with asthma, now is on inhalers, she also had a bunionectomy has been off her feet, appetite has been stable she her creatinine most recently did improved to 1 9, but she has had fluctuating creatinine throughout the years as high as 2 4 and as low as 1 9 she does have multiple episodes of acute kidney injury related to volume depletion chief currently has been feeling well she is status post AV fistula creation she is following up with vascular surgery in regards to this and at this point is tolerating fistula placement this has a good bruit and thrill, she had some numbness that is improved overall she states her urine output has been low, her weight has been stable and her creatinine remained stable    ROS:   All the systems were reviewed and were negative except as documented on the HPI      Allergies: Pollen extract    Medications:   Current Outpatient Medications:     acetaminophen (TYLENOL) 325 mg tablet, Take 650 mg by mouth every 6 (six) hours as needed for
mild pain, Disp: , Rfl:     albuterol (PROAIR HFA) 90 mcg/act inhaler, Inhale 2 puffs every 6 (six) hours as needed for wheezing, Disp: 1 Inhaler, Rfl: 5    AMILoride 5 mg tablet, Take 5 mg by mouth 2 (two) times a day, Disp: , Rfl: 3    aspirin (ECOTRIN LOW STRENGTH) 81 mg EC tablet, Take 1 tablet (81 mg total) by mouth daily, Disp: 30 tablet, Rfl: 0    atorvastatin (LIPITOR) 40 mg tablet, Take 1 tablet (40 mg total) by mouth daily, Disp: 30 tablet, Rfl: 5    budesonide-formoterol (SYMBICORT) 160-4 5 mcg/act inhaler, Inhale 2 puffs 2 (two) times a day Rinse mouth after use , Disp: 3 Inhaler, Rfl: 3    carvedilol (COREG) 3 125 mg tablet, TAKE 1 TABLET BY MOUTH TWICE DAILY WITH MEALS, Disp: 60 tablet, Rfl: 2    clonazePAM (KlonoPIN) 0 5 mg tablet, Take 1 tablet (0 5 mg total) by mouth 3 (three) times a day, Disp: 270 tablet, Rfl: 0    fluvoxaMINE (LUVOX) 100 mg tablet, Take 100 mg by mouth 3 (three) times a day  , Disp: , Rfl:     gabapentin (NEURONTIN) 100 mg capsule, Take 1 capsule (100 mg total) by mouth 2 (two) times a day, Disp: 28 capsule, Rfl: 0    lamoTRIgine (LaMICtal) 100 mg tablet, Take 100 mg by mouth 3 (three) times a day Pt takes 3 times daily, Disp: , Rfl:     omeprazole (PriLOSEC) 40 MG capsule, TAKE 1 CAPSULE BY MOUTH  DAILY AT BEDTIME, Disp: 90 capsule, Rfl: 3    oxyCODONE-acetaminophen (PERCOCET) 5-325 mg per tablet, Take 1 tablet by mouth every 4 (four) hours as needed for moderate painMax Daily Amount: 6 tablets, Disp: 10 tablet, Rfl: 0    Polyethylene Glycol 3350 (MIRALAX PO), Take by mouth as needed , Disp: , Rfl:     QUEtiapine (SEROQUEL) 300 mg tablet, Take 1 tablet (300 mg total) by mouth every morning 1 in the AM     ( also takes 400 mg at bedtime), Disp: 90 tablet, Rfl: 3    QUEtiapine (SEROquel) 400 MG tablet, 1 at bedtime, Disp: , Rfl:     traMADol (ULTRAM) 50 mg tablet, Take 1 tablet (50 mg total) by mouth 2 (two) times a day, Disp: 20 tablet, Rfl: 0    amLODIPine
(NORVASC) 5 mg tablet, , Disp: , Rfl:     linaCLOtide (LINZESS) 290 MCG CAPS, Take 1 capsule by mouth daily for 90 days, Disp: 90 capsule, Rfl: 3    Past Medical History:   Diagnosis Date    Ankle sprain     left    Anxiety disorder     Bipolar 2 disorder (HCC)     Chronic back pain     CKD (chronic kidney disease) stage 3, GFR 30-59 ml/min (HCC)     stage 4 (as per pt)    CVA (cerebral vascular accident) ([address] )     noted on MRI in the past    Depression     GERD (gastroesophageal reflux disease)     Hypercholesteremia     Hyperlipidemia     Hypernatremia     Hypertension     Hypokalemia     Intervertebral disc disorder with radiculopathy of lumbosacral region     resolved: 2015    Kidney disease     Panic attacks     Pericardial effusion     PONV (postoperative nausea and vomiting)     Radiculitis     resolved: 2015    Secondary renal hyperparathyroidism ([address] )     Vitamin D deficiency      Past Surgical History:   Procedure Laterality Date    BUNIONECTOMY      Left foot     COLON SURGERY      COLONOSCOPY  2018    DILATION AND CURETTAGE OF UTERUS      INDUCED       surgically induced    MT ANASTOMOSIS,AV,ANY SITE Left 2019    Procedure: CREATION FISTULA ARTERIOVENOUS (AV) left wrists possible left upper;  Surgeon: Zbigniew Branch MD;  [facility];  Service: Vascular    MT CORRJ HALLUX VALGUS W/SESMDC W/DIST METAR OSTEOT Left 2019    Procedure: Shanda Darling;  Surgeon: Tommie Lewis DPM;  [facility];  Service: Podiatry    MT ERCP DX COLLECTION SPECIMEN BRUSHING/WASHING N/A 2018    Procedure: ENDOSCOPIC RETROGRADE CHOLANGIOPANCREATOGRAPHY (ERCP);   Surgeon: Abrahan Go MD;  [facility];  Service: Gastroenterology    MT LAP, SURG PROCTOPEXY N/A 2018    Procedure: ROBOTIC SIGMOID RESECTION / RECTOPEXY;  Surgeon: Chin Carrera MD;  [facility];  Service: Colorectal    MT SIGMOIDOSCOPY FLX DX W/COLLJ
SPEC BR/WA IF PFRMD N/A 7/13/2018    Procedure: Tari Hyatt;  Surgeon: Jacklyn Ortega MD;  [facility];  Service: Colorectal    TUBAL LIGATION Bilateral 1997   [place] [id number] THYROID BIOPSY  7/30/2019     Family History   Problem Relation Age of Onset    Bipolar disorder Mother     Mental illness Mother         depression    Stroke Mother     Dementia Mother     Heart disease Father     Hypertension Father     Diabetes Father     Other Family         Back disorder    Diabetes Family     Heart disease Family     Hypertension Family     Breast cancer Family     Stroke Family     Thyroid disease Family     Breast cancer Paternal Grandmother     Breast cancer Paternal [de-identified]     Breast cancer Maternal Aunt     Mental illness Sister     Mental illness Sister     Heart disease Sister     No Known Problems Sister     No Known Problems Sister     Other Son         pituitary tumor    Hypertension Son     Obesity Son     No Known Problems Son     Substance Abuse Neg Hx         neg fam hx      reports that she has never smoked  She has never used smokeless tobacco  She reports that she has current or past drug history  Drug: Marijuana  She reports that she does not drink alcohol  Physical Exam:   Vitals:    12/26/19 1222   BP: 138/77   BP Location: Right arm   Patient Position: Sitting   Cuff Size: Large   Pulse: 92   Weight: 92 4 kg (203 lb 12 8 oz)   Height: 5' 8" (1 727 m)     Body mass index is 30 99 kg/m²      General: conscious, cooperative, in no acute distress  Eyes: conjunctivae pink, anicteric sclerae  ENT: lips and mucous membranes moist  Neck: supple, no JVD  Chest: clear breath sounds bilateral, no crackles, ronchus or wheezings  CVS: normal rate, regular rhythm  Abdomen: soft, non-tender, non-distended, normoactive bowel sounds  Extremities: no edema of both legs  Skin: no rash  Neuro: awake, alert, oriented  Psych:  Pleasant affect    Lab Results:    Results for
orders placed or performed in visit on 12/18/19   CBC and differential   Result Value Ref Range    WBC 6 31 4 31 - 10 16 Thousand/uL    RBC 4 10 3 81 - 5 12 Million/uL    Hemoglobin 13 0 11 5 - 15 4 g/dL    Hematocrit 41 8 34 8 - 46 1 %     (H) 82 - 98 fL    MCH 31 7 26 8 - 34 3 pg    MCHC 31 1 (L) 31 4 - 37 4 g/dL    RDW 13 1 11 6 - 15 1 %    MPV 10 9 8 9 - 12 7 fL    Platelets 127 494 - 718 Thousands/uL    nRBC 0 /100 WBCs    Neutrophils Relative 61 43 - 75 %    Immat GRANS % 0 0 - 2 %    Lymphocytes Relative 19 14 - 44 %    Monocytes Relative 7 4 - 12 %    Eosinophils Relative 11 (H) 0 - 6 %    Basophils Relative 2 (H) 0 - 1 %    Neutrophils Absolute 3 82 1 85 - 7 62 Thousands/µL    Immature Grans Absolute 0 01 0 00 - 0 20 Thousand/uL    Lymphocytes Absolute 1 20 0 60 - 4 47 Thousands/µL    Monocytes Absolute 0 46 0 17 - 1 22 Thousand/µL    Eosinophils Absolute 0 72 (H) 0 00 - 0 61 Thousand/µL    Basophils Absolute 0 10 0 00 - 0 10 Thousands/µL   Comprehensive metabolic panel   Result Value Ref Range    Sodium 142 136 - 145 mmol/L    Potassium 5 1 3 5 - 5 3 mmol/L    Chloride 111 (H) 100 - 108 mmol/L    CO2 25 21 - 32 mmol/L    ANION GAP 6 4 - 13 mmol/L    BUN 40 (H) 5 - 25 mg/dL    Creatinine 1 96 (H) 0 60 - 1 30 mg/dL    Glucose, Fasting 109 (H) 65 - 99 mg/dL    Calcium 10 4 (H) 8 3 - 10 1 mg/dL    Corrected Calcium 10 3 (H) 8 3 - 10 1 mg/dL    AST 17 5 - 45 U/L    ALT 27 12 - 78 U/L    Alkaline Phosphatase 241 (H) 46 - 116 U/L    Total Protein 7 8 6 4 - 8 2 g/dL    Albumin 4 1 3 5 - 5 0 g/dL    Total Bilirubin 0 30 0 20 - 1 00 mg/dL    eGFR 28 ml/min/1 73sq m   Magnesium   Result Value Ref Range    Magnesium 2 4 1 6 - 2 6 mg/dL   Phosphorus   Result Value Ref Range    Phosphorus 3 9 2 7 - 4 5 mg/dL   PTH, intact   Result Value Ref Range     0 (H) 18 4 - 80 1 pg/mL   Uric acid   Result Value Ref Range    Uric Acid 5 6 2 0 - 6 8 mg/dL       Portions of the record may have been created with voice
recognition software  Occasional wrong word or "sound a like" substitutions may have occurred due to the inherent limitations of voice recognition software  Read the chart carefully and recognize, using context, where substitutions have occurred  If you have any questions, please contact the dictating provider 
Crohn's disease
Rash

## 2020-01-09 NOTE — ED ADULT TRIAGE NOTE - CADM TRG TX PRIOR TO ARRIVAL
none Metronidazole Counseling:  I discussed with the patient the risks of metronidazole including but not limited to seizures, nausea/vomiting, a metallic taste in the mouth, nausea/vomiting and severe allergy.

## 2020-08-04 PROBLEM — J45.909 UNSPECIFIED ASTHMA, UNCOMPLICATED: Chronic | Status: ACTIVE | Noted: 2017-11-10

## 2020-08-04 PROBLEM — F42.9 OBSESSIVE-COMPULSIVE DISORDER, UNSPECIFIED: Chronic | Status: ACTIVE | Noted: 2017-11-10

## 2020-08-04 PROBLEM — K21.9 GASTRO-ESOPHAGEAL REFLUX DISEASE WITHOUT ESOPHAGITIS: Chronic | Status: ACTIVE | Noted: 2017-11-17

## 2020-08-05 DIAGNOSIS — Z20.828 CONTACT WITH AND (SUSPECTED) EXPOSURE TO OTHER VIRAL COMMUNICABLE DISEASES: ICD-10-CM

## 2020-08-09 DIAGNOSIS — Z01.818 ENCOUNTER FOR OTHER PREPROCEDURAL EXAMINATION: ICD-10-CM

## 2020-08-10 ENCOUNTER — APPOINTMENT (OUTPATIENT)
Dept: DISASTER EMERGENCY | Facility: CLINIC | Age: 45
End: 2020-08-10

## 2020-08-11 LAB — SARS-COV-2 N GENE NPH QL NAA+PROBE: NOT DETECTED

## 2020-08-14 ENCOUNTER — APPOINTMENT (OUTPATIENT)
Dept: PULMONOLOGY | Facility: CLINIC | Age: 45
End: 2020-08-14
Payer: COMMERCIAL

## 2020-08-14 VITALS
OXYGEN SATURATION: 99 % | TEMPERATURE: 98.2 F | SYSTOLIC BLOOD PRESSURE: 115 MMHG | BODY MASS INDEX: 31.34 KG/M2 | RESPIRATION RATE: 15 BRPM | DIASTOLIC BLOOD PRESSURE: 74 MMHG | HEIGHT: 66 IN | HEART RATE: 102 BPM | WEIGHT: 195 LBS

## 2020-08-14 DIAGNOSIS — Z82.49 FAMILY HISTORY OF ISCHEMIC HEART DISEASE AND OTHER DISEASES OF THE CIRCULATORY SYSTEM: ICD-10-CM

## 2020-08-14 DIAGNOSIS — Z83.49 FAMILY HISTORY OF OTHER ENDOCRINE, NUTRITIONAL AND METABOLIC DISEASES: ICD-10-CM

## 2020-08-14 DIAGNOSIS — Z83.79 FAMILY HISTORY OF OTHER DISEASES OF THE DIGESTIVE SYSTEM: ICD-10-CM

## 2020-08-14 DIAGNOSIS — Z80.8 FAMILY HISTORY OF MALIGNANT NEOPLASM OF OTHER ORGANS OR SYSTEMS: ICD-10-CM

## 2020-08-14 LAB
A1AT SERPL-MCNC: 154 MG/DL
IGA SER QL IEP: 239 MG/DL
POCT - HEMOGLOBIN (HGB), QUANTITATIVE, TRANSCUTANEOUS: 13.1

## 2020-08-14 PROCEDURE — 94729 DIFFUSING CAPACITY: CPT

## 2020-08-14 PROCEDURE — 88738 HGB QUANT TRANSCUTANEOUS: CPT

## 2020-08-14 PROCEDURE — 95012 NITRIC OXIDE EXP GAS DETER: CPT

## 2020-08-14 PROCEDURE — 94060 EVALUATION OF WHEEZING: CPT

## 2020-08-14 PROCEDURE — 71046 X-RAY EXAM CHEST 2 VIEWS: CPT

## 2020-08-14 PROCEDURE — 99205 OFFICE O/P NEW HI 60 MIN: CPT | Mod: 25

## 2020-08-14 PROCEDURE — 94618 PULMONARY STRESS TESTING: CPT

## 2020-08-14 PROCEDURE — 94727 GAS DIL/WSHOT DETER LNG VOL: CPT

## 2020-08-14 RX ORDER — OMEPRAZOLE 40 MG/1
40 CAPSULE, DELAYED RELEASE ORAL
Qty: 90 | Refills: 3 | Status: ACTIVE | COMMUNITY
Start: 2020-08-14

## 2020-08-14 RX ORDER — BUPROPION HYDROCHLORIDE 150 MG/1
150 TABLET, FILM COATED, EXTENDED RELEASE ORAL
Qty: 30 | Refills: 3 | Status: ACTIVE | COMMUNITY
Start: 2020-08-14

## 2020-08-14 RX ORDER — BUSPIRONE HYDROCHLORIDE 7.5 MG/1
7.5 TABLET ORAL
Refills: 0 | Status: ACTIVE | COMMUNITY
Start: 2020-08-14

## 2020-08-14 RX ORDER — DULOXETINE HYDROCHLORIDE 60 MG/1
60 CAPSULE, DELAYED RELEASE ORAL
Refills: 0 | Status: ACTIVE | COMMUNITY
Start: 2020-08-14

## 2020-08-14 NOTE — DISCUSSION/SUMMARY
[FreeTextEntry1] : Chronic persistent asthma\par Normal pulmonary physiology with normal NIOX and 6-minute walk test\par History of mucoid impaction\par Hx  prior negative Aspergillus titers\par Sinus tachycardia\par Crohn's colitis without treatment with biologic agent\par  component of anxiety depressive disorder\par \par Due to prior known history of copious secretions mucoid bronchial impaction will add Lama agent\par Work including asthma Food profile as well as alpha-1 antitrypsin titer IgA IgE G IgE and CF mutation study\par As per cardiology patient is having a beta-blocker added will need to monitor spirometric data and clinical symptoms\par Office follow-up 1 month\par Sample for 1 month provided Bevespi 2 puffs twice daily continue Symbicort monitor use of Ventolin\par Notify of any wheezing.  Notify if rescue inhaler is needed greater than 2-3 times in any week.  Avoid known triggers.\par \par \par

## 2020-08-14 NOTE — REVIEW OF SYSTEMS
[Cough] : cough [Chest Tightness] : chest tightness [Dyspnea] : dyspnea [Wheezing] : wheezing [SOB on Exertion] : sob on exertion [Palpitations] : palpitations [Seasonal Allergies] : seasonal allergies [Depression] : depression [Anxiety] : anxiety [Negative] : Neurologic [Frequent URIs] : no frequent URIs [Hemoptysis] : no hemoptysis [Sputum] : no sputum [Pleuritic Pain] : no pleuritic pain [A.M. Dry Mouth] : no a.m. dry mouth [Diabetes] : no diabetes [TextBox_30] : HPI [Thyroid Problem] : no thyroid problem [TextBox_44] : Sinus tachycardia [TextBox_69] : Crohn's colitis not actively on biologic agents

## 2020-08-14 NOTE — PROCEDURE
[FreeTextEntry1] : PFT August 14, 2020\par Normal flow rates\par No response to bronchodilator at the FEV1\par Borderline 17% spots to bronchodilator at the small airways\par Normal lung volumes\par Diffusion normal 104% of predicted.\par \par NIOX 13 ppb\par Pulmonary 6-minute walk stress test August 14, 2020\par Baseline room air O2 saturation 97% with a heart rate baseline 97 there immediately increased to 115\par Maximum heart rate was 130 at 5 minutes with room air O2 saturation 98%\par Impression sinus tachycardia\par Negative exertional hypoxemia or desaturation\par No indication for portable oxygen therapy\par \par Chest x-ray PA lateral August 14, 2020\par Normal cardiac size\par Lung fields are clear without parenchymal infiltrates pleural effusions or dominant pulmonary nodules\par Soft tissue bony structures grossly unremarkable\par Annalisa mediastinum grossly unremarkable\par Impression clear lungs\par Blood draw\par

## 2020-08-14 NOTE — HISTORY OF PRESENT ILLNESS
[Never] : never [TextBox_4] : 45-year-old female\par Pulmonary evaluation\par Chief complaint chronic chest congestion occasional wheeze with a history of asthma\par Patient states is a longstanding asthma diagnosed onset childhood\par She states that 2018 she was admitted and treated for bilateral pneumonia [facility]\par In addition at that time being treated with antibiotics bronchodilators she underwent a bronchoscopy and had friable airways with copious mucoid secretions\par States since then she feels like a long-term asthma status has been worse\par She describes occasional but chronic wheeze as well as chest tightness chest congestion\par There is some associated shortness of breath\par She also admits there is some component of anxiety\par She is also been monitoring her heart rate with a Fitbit type watch as well as has tachycardia and is undergone cardiac evaluation and was prescribed a beta-blocker for which she has yet picked up\par Denies any sputum production or hemoptysis\par No fevers chills or sweats\par Her other medical history is significant for Crohn's colitis with no treatment for biologic agents\par No reported history of chemical toxic or inhalational exposures\par She states this past week or 2 she is used her rescue inhaler Ventolin well greater than 2-3 times\par She is on Symbicort 160 just 4.52 puffs twice daily\par \par Seasonal allergies reported\par   3 para 3 menopausal\par \par Non-smoker\par No alcohol use\par \par Hospital data review 2018\par CT angiogram\par Negative for pulmonary embolism\par Ported that prior groundglass and tree-in-bud opacities resolved\par \par \par Additional data review 2017 lupus anticoagulant silica clotting time positive\par Prior d-dimer is negative\par 2017 ANCA negative atypical ANCA negative Aspergillus negative [place] antibody negative Mycoplasma IgG +2017 AFB bronchoscopy negative bronchoscopy special stains negative

## 2020-08-16 LAB
IGG SUBSET TOTAL IGG: 895 MG/DL
IGG1 SER-MCNC: 400 MG/DL
IGG2 SER-MCNC: 340 MG/DL
IGG3 SER-MCNC: 39 MG/DL
IGG4 SER-MCNC: 44 MG/DL

## 2020-08-24 LAB — CFTR MUT TESTED BLD/T: NEGATIVE

## 2020-09-06 ENCOUNTER — APPOINTMENT (OUTPATIENT)
Dept: DISASTER EMERGENCY | Facility: CLINIC | Age: 45
End: 2020-09-06

## 2020-09-07 LAB — SARS-COV-2 N GENE NPH QL NAA+PROBE: NOT DETECTED

## 2020-09-11 ENCOUNTER — APPOINTMENT (OUTPATIENT)
Dept: PULMONOLOGY | Facility: CLINIC | Age: 45
End: 2020-09-11
Payer: COMMERCIAL

## 2020-09-11 VITALS
HEART RATE: 104 BPM | SYSTOLIC BLOOD PRESSURE: 114 MMHG | RESPIRATION RATE: 14 BRPM | OXYGEN SATURATION: 97 % | DIASTOLIC BLOOD PRESSURE: 74 MMHG | TEMPERATURE: 98 F

## 2020-09-11 PROCEDURE — 94727 GAS DIL/WSHOT DETER LNG VOL: CPT

## 2020-09-11 PROCEDURE — 94060 EVALUATION OF WHEEZING: CPT

## 2020-09-11 PROCEDURE — 94729 DIFFUSING CAPACITY: CPT

## 2020-09-11 PROCEDURE — 95012 NITRIC OXIDE EXP GAS DETER: CPT

## 2020-09-11 PROCEDURE — 99214 OFFICE O/P EST MOD 30 MIN: CPT | Mod: 25

## 2020-09-11 NOTE — HISTORY OF PRESENT ILLNESS
[Never] : never [TextBox_4] : Sxs resp chest congestion improved\par  wheeze  better\par SOB overall much improved\par \par 45-year-old female\par Pulmonary evaluation\par Chief complaint chronic chest congestion occasional wheeze with a history of asthma\par Patient states is a longstanding asthma diagnosed onset childhood\par She states that 2018 she was admitted and treated for bilateral pneumonia [facility]\par In addition at that time being treated with antibiotics bronchodilators she underwent a bronchoscopy and had friable airways with copious mucoid secretions\par States since then she feels like a long-term asthma status has been worse\par She describes occasional but chronic wheeze as well as chest tightness chest congestion\par There is some associated shortness of breath\par She also admits there is some component of anxiety\par She is also been monitoring her heart rate with a Fitbit type watch as well as has tachycardia and is undergone cardiac evaluation and was prescribed a beta-blocker for which she has yet picked up\par Denies any sputum production or hemoptysis\par No fevers chills or sweats\par Her other medical history is significant for Crohn's colitis with no treatment for biologic agents\par No reported history of chemical toxic or inhalational exposures\par She states this past week or 2 she is used her rescue inhaler Ventolin well greater than 2-3 times\par She is on Symbicort 160 just 4.52 puffs twice daily\par \par Seasonal allergies reported\par   3 para 3 menopausal\par \par Non-smoker\par No alcohol use\par \par Hospital data review 2018\par CT angiogram\par Negative for pulmonary embolism\par Ported that prior groundglass and tree-in-bud opacities resolved\par \par \par Additional data review 2017 lupus anticoagulant silica clotting time positive\par Prior d-dimer is negative\par 2017 ANCA negative atypical ANCA negative Aspergillus negative Marion antibody negative Mycoplasma IgG +2017 AFB bronchoscopy negative bronchoscopy special stains negative

## 2020-09-11 NOTE — PROCEDURE
[FreeTextEntry1] : NIOX 14  ppb 9/11/20\par PFT 9/11/2020\par spirometry  normal flow  rates\par lung volumes normal\par normal DLCO\par  HGB 13.1\par \par PFT August 14, 2020\par Normal flow rates\par No response to bronchodilator at the FEV1\par Borderline 17% spots to bronchodilator at the small airways\par Normal lung volumes\par Diffusion normal 104% of predicted.\par \par NIOX 13 ppb\par Pulmonary 6-minute walk stress test August 14, 2020\par Baseline room air O2 saturation 97% with a heart rate baseline 97 there immediately increased to 115\par Maximum heart rate was 130 at 5 minutes with room air O2 saturation 98%\par Impression sinus tachycardia\par Negative exertional hypoxemia or desaturation\par No indication for portable oxygen therapy\par \par Chest x-ray PA lateral August 14, 2020\par Normal cardiac size\par Lung fields are clear without parenchymal infiltrates pleural effusions or dominant pulmonary nodules\par Soft tissue bony structures grossly unremarkable\par Annalisa mediastinum grossly unremarkable\par Impression clear lungs\par \par Blood draw\par

## 2020-09-11 NOTE — PHYSICAL EXAM
[No Acute Distress] : no acute distress [Normal Oropharynx] : normal oropharynx [Normal Appearance] : normal appearance [No Neck Mass] : no neck mass [Normal Rate/Rhythm] : normal rate/rhythm [Normal S1, S2] : normal s1, s2 [No Resp Distress] : no resp distress [No Murmurs] : no murmurs [Clear to Auscultation Bilaterally] : clear to auscultation bilaterally [No Abnormalities] : no abnormalities [Benign] : benign [Normal Gait] : normal gait [No Clubbing] : no clubbing [No Cyanosis] : no cyanosis [No Edema] : no edema [FROM] : FROM [Normal Color/ Pigmentation] : normal color/ pigmentation [Oriented x3] : oriented x3 [No Focal Deficits] : no focal deficits [Normal Affect] : normal affect

## 2020-09-11 NOTE — REVIEW OF SYSTEMS
[Cough] : cough [Chest Tightness] : chest tightness [Dyspnea] : dyspnea [Wheezing] : wheezing [Palpitations] : palpitations [SOB on Exertion] : sob on exertion [Seasonal Allergies] : seasonal allergies [Depression] : depression [Anxiety] : anxiety [Negative] : Hematologic [Hemoptysis] : no hemoptysis [Frequent URIs] : no frequent URIs [Sputum] : no sputum [Pleuritic Pain] : no pleuritic pain [Diabetes] : no diabetes [A.M. Dry Mouth] : no a.m. dry mouth [Thyroid Problem] : no thyroid problem [TextBox_30] : HPI [TextBox_44] : Sinus tachycardia [TextBox_69] : Crohn's colitis not actively on biologic agents

## 2020-09-11 NOTE — DISCUSSION/SUMMARY
[FreeTextEntry1] : Chronic persistent asthma\par Normal pulmonary physiology with normal NIOX and 6-minute walk test\par History of mucoid impaction\par Hx  prior negative Aspergillus titers\par Sinus tachycardia\par Crohn's colitis without treatment with biologic agent\par  component of anxiety depressive disorder\par \par Due to prior known history of copious secretions mucoid bronchial impaction will add Lama agent\par Work including asthma Food profile as well as alpha-1 antitrypsin titer IgA IgE G IgE and CF mutation study\par As per cardiology patient is having a beta-blocker added will need to monitor spirometric data and clinical symptoms\par Office follow-up 1 month\par Sample for 1 month provided Bevespi 2 puffs twice daily continue Symbicort monitor use of Ventolin\par Notify of any wheezing.  Notify if rescue inhaler is needed greater than 2-3 times in any week.  Avoid known triggers.\par D/C Bevespi\par change to Spiriva respimat 1.25 mcg QD\par \par \par

## 2020-10-10 DIAGNOSIS — Z01.818 ENCOUNTER FOR OTHER PREPROCEDURAL EXAMINATION: ICD-10-CM

## 2020-10-11 ENCOUNTER — APPOINTMENT (OUTPATIENT)
Dept: DISASTER EMERGENCY | Facility: CLINIC | Age: 45
End: 2020-10-11

## 2020-10-12 ENCOUNTER — RESULT REVIEW (OUTPATIENT)
Age: 45
End: 2020-10-12

## 2020-10-14 ENCOUNTER — APPOINTMENT (OUTPATIENT)
Dept: PULMONOLOGY | Facility: CLINIC | Age: 45
End: 2020-10-14
Payer: COMMERCIAL

## 2020-10-14 VITALS
TEMPERATURE: 98.1 F | DIASTOLIC BLOOD PRESSURE: 88 MMHG | HEIGHT: 66 IN | HEART RATE: 110 BPM | OXYGEN SATURATION: 94 % | BODY MASS INDEX: 31.02 KG/M2 | RESPIRATION RATE: 16 BRPM | SYSTOLIC BLOOD PRESSURE: 118 MMHG | WEIGHT: 193 LBS

## 2020-10-14 PROCEDURE — ZZZZZ: CPT

## 2020-10-14 PROCEDURE — 94727 GAS DIL/WSHOT DETER LNG VOL: CPT

## 2020-10-14 PROCEDURE — 94010 BREATHING CAPACITY TEST: CPT

## 2020-10-14 PROCEDURE — 99213 OFFICE O/P EST LOW 20 MIN: CPT | Mod: 25

## 2020-10-14 PROCEDURE — 94729 DIFFUSING CAPACITY: CPT

## 2020-10-14 NOTE — REVIEW OF SYSTEMS
[Cough] : cough [Chest Tightness] : chest tightness [Dyspnea] : dyspnea [Wheezing] : wheezing [SOB on Exertion] : sob on exertion [Palpitations] : palpitations [Depression] : depression [Anxiety] : anxiety [Seasonal Allergies] : seasonal allergies [Negative] : Neurologic [Hemoptysis] : no hemoptysis [Frequent URIs] : no frequent URIs [Sputum] : no sputum [A.M. Dry Mouth] : no a.m. dry mouth [Pleuritic Pain] : no pleuritic pain [Diabetes] : no diabetes [Thyroid Problem] : no thyroid problem [TextBox_44] : Sinus tachycardia [TextBox_30] : HPI [TextBox_69] : Crohn's colitis not actively on biologic agents

## 2020-10-14 NOTE — HISTORY OF PRESENT ILLNESS
[Never] : never [TextBox_4] : Sxs resp chest congestion improved\par  wheeze  better\par SOB overall much improved\par \par 45-year-old female\par Pulmonary evaluation\par Chief complaint chronic chest congestion occasional wheeze with a history of asthma\par Patient states is a longstanding asthma diagnosed onset childhood\par She states that 2018 she was admitted and treated for bilateral pneumonia [facility]\par In addition at that time being treated with antibiotics bronchodilators she underwent a bronchoscopy and had friable airways with copious mucoid secretions\par States since then she feels like a long-term asthma status has been worse\par She describes occasional but chronic wheeze as well as chest tightness chest congestion\par There is some associated shortness of breath\par She also admits there is some component of anxiety\par She is also been monitoring her heart rate with a Fitbit type watch as well as has tachycardia and is undergone cardiac evaluation and was prescribed a beta-blocker for which she has yet picked up\par Denies any sputum production or hemoptysis\par No fevers chills or sweats\par Her other medical history is significant for Crohn's colitis with no treatment for biologic agents\par No reported history of chemical toxic or inhalational exposures\par She states this past week or 2 she is used her rescue inhaler Ventolin well greater than 2-3 times\par She is on Symbicort 160 just 4.52 puffs twice daily\par \par Seasonal allergies reported\par   3 para 3 menopausal\par \par Non-smoker\par No alcohol use\par \par Hospital data review 2018\par CT angiogram\par Negative for pulmonary embolism\par Ported that prior groundglass and tree-in-bud opacities resolved\par \par \par Additional data review 2017 lupus anticoagulant silica clotting time positive\par Prior d-dimer is negative\par 2017 ANCA negative atypical ANCA negative Aspergillus negative Marion antibody negative Mycoplasma IgG +2017 AFB bronchoscopy negative bronchoscopy special stains negative

## 2020-10-14 NOTE — PROCEDURE
[FreeTextEntry1] : NIOX 14  ppb 9/11/20\par PFT 9/11/2020\par spirometry  normal flow  rates\par lung volumes normal\par normal DLCO\par  HGB 13.1\par \par PFT August 14, 2020\par Normal flow rates\par No response to bronchodilator at the FEV1\par Borderline 17% BD  to bronchodilator at the small airways\par Normal lung volumes\par Diffusion normal 104% of predicted.\par \par NIOX 13 ppb\par Pulmonary 6-minute walk stress test August 14, 2020\par Baseline room air O2 saturation 97% with a heart rate baseline 97 there immediately increased to 115\par Maximum heart rate was 130 at 5 minutes with room air O2 saturation 98%\par Impression sinus tachycardia\par Negative exertional hypoxemia or desaturation\par No indication for portable oxygen therapy\par \par Chest x-ray PA lateral August 14, 2020\par Normal cardiac size\par Lung fields are clear without parenchymal infiltrates pleural effusions or dominant pulmonary nodules\par Soft tissue bony structures grossly unremarkable\par Annalisa mediastinum grossly unremarkable\par Impression clear lungs\par \par \par

## 2020-10-14 NOTE — DISCUSSION/SUMMARY
[FreeTextEntry1] : Chronic persistent asthma\par Normal pulmonary physiology with normal NIOX and 6-minute walk test\par History of mucoid impaction\par Hx  prior negative Aspergillus titers\par Sinus tachycardia\par Crohn's colitis without treatment with biologic agent\par  component of anxiety depressive disorder\par \par Due to prior known history of copious secretions mucoid bronchial impaction will add Lama agent\par Work including asthma Food profile as well as alpha-1 antitrypsin titer IgA IgE G IgE and CF mutation study\par As per cardiology patient is having a beta-blocker added will need to monitor spirometric data and clinical symptoms\par Office follow-up 1 month\par  continue Symbicort monitor use of Ventolin\par Notify of any wheezing.  Notify if rescue inhaler is needed greater than 2-3 times in any week.  Avoid known triggers.\par change to Spiriva respimat 1.25 mcg QD\par \par \par

## 2020-12-16 ENCOUNTER — APPOINTMENT (OUTPATIENT)
Dept: PULMONOLOGY | Facility: CLINIC | Age: 45
End: 2020-12-16

## 2021-01-24 LAB — SARS-COV-2 N GENE NPH QL NAA+PROBE: NOT DETECTED

## 2021-02-01 ENCOUNTER — APPOINTMENT (OUTPATIENT)
Dept: PULMONOLOGY | Facility: CLINIC | Age: 46
End: 2021-02-01
Payer: COMMERCIAL

## 2021-02-01 PROCEDURE — 99213 OFFICE O/P EST LOW 20 MIN: CPT | Mod: 95

## 2021-02-01 RX ORDER — DIAZEPAM 10 MG/1
10 TABLET ORAL
Qty: 1 | Refills: 0 | Status: ACTIVE | COMMUNITY
Start: 2020-12-29

## 2021-02-01 RX ORDER — ROSUVASTATIN CALCIUM 10 MG/1
10 TABLET, FILM COATED ORAL
Qty: 30 | Refills: 0 | Status: ACTIVE | COMMUNITY
Start: 2020-09-25

## 2021-02-01 RX ORDER — BUPROPION HYDROCHLORIDE 150 MG/1
150 TABLET, EXTENDED RELEASE ORAL
Qty: 90 | Refills: 0 | Status: ACTIVE | COMMUNITY
Start: 2020-08-31

## 2021-02-01 NOTE — DISCUSSION/SUMMARY
[FreeTextEntry1] : Chronic persistent asthma\par Normal pulmonary physiology with normal NIOX and 6-minute walk test\par History of mucoid impaction\par Hx prior negative Aspergillus titers\par Sinus tachycardia\par Crohn's colitis without treatment with biologic agent\par  component of anxiety depressive disorder\par \par Due to prior known history of copious secretions mucoid bronchial impaction will add Lama agent\par As per cardiology patient is having a beta-blocker added will need to monitor spirometric data and clinical symptoms\par Office follow-up 1 month\par  continue Symbicort monitor use of Ventolin\par Notify of any wheezing. Notify if rescue inhaler is needed greater than 2-3 times in any week. Avoid known triggers.\par change to Spiriva respimat 1.25 mcg QD- on hold\par Notify of any wheezing.  Notify if rescue inhaler is needed greater than 2-3 times in any week.  Avoid known triggers.\par \par Did receive first dose of Pfizer vaccination without complications although felt little  chest  discomfort\par

## 2021-02-01 NOTE — HISTORY OF PRESENT ILLNESS
[Home] : at home, [unfilled] , at the time of the visit. [Medical Office: ([facility])___] : at the medical office located in  [Verbal consent obtained from patient] : the patient, [unfilled] [TextBox_4] : Sxs resp chest congestion improved\par  wheeze better\par SOB overall much improved\par < 3 x per OMARI\par \par 45-year-old female\par Pulmonary evaluation\par Chief complaint chronic chest congestion occasional wheeze with a history of asthma\par Patient states is a longstanding asthma diagnosed onset childhood\par She states that 2018 she was admitted and treated for bilateral pneumonia [facility]\par In addition at that time being treated with antibiotics bronchodilators she underwent a bronchoscopy and had friable airways with copious mucoid secretions\par States since then she feels like a long-term asthma status has been worse\par She describes occasional but chronic wheeze as well as chest tightness chest congestion\par There is some associated shortness of breath\par She also admits there is some component of anxiety\par She is also been monitoring her heart rate with a Fitbit type watch as well as has tachycardia and is undergone cardiac evaluation and was prescribed a beta-blocker for which she has yet picked up\par Denies any sputum production or hemoptysis\par No fevers chills or sweats\par Her other medical history is significant for Crohn's colitis with no treatment for biologic agents\par No reported history of chemical toxic or inhalational exposures\par She states this past week or 2 she is used her rescue inhaler Ventolin well greater than 2-3 times\par She is on Symbicort 160 just 4.52 puffs twice daily\par \par Seasonal allergies reported\par   3 para 3 menopausal\par \par Non-smoker\par No alcohol use\par \par Hospital data review 2018\par CT angiogram\par Negative for pulmonary embolism\par  prior groundglass and tree-in-bud opacities resolved\par \par \par Additional data review 2017 lupus anticoagulant silica clotting time positive\par Prior d-dimer is negative\par 2017 ANCA negative atypical ANCA negative Aspergillus negative Marion antibody negative Mycoplasma IgG +2017 AFB bronchoscopy negative bronchoscopy special stains negative. \par Smoking Status: never \par eCigarettes: never \par Marijuana use: never \par

## 2021-02-01 NOTE — REVIEW OF SYSTEMS
[Cough] : cough [Chest Tightness] : chest tightness [Dyspnea] : dyspnea [Wheezing] : wheezing [SOB on Exertion] : sob on exertion [Palpitations] : palpitations [Seasonal Allergies] : seasonal allergies [Depression] : depression [Anxiety] : anxiety [Negative] : Neurologic [Hemoptysis] : no hemoptysis [Frequent URIs] : no frequent URIs [Sputum] : no sputum [Pleuritic Pain] : no pleuritic pain [A.M. Dry Mouth] : no a.m. dry mouth [Diabetes] : no diabetes [Thyroid Problem] : no thyroid problem [TextBox_44] : Sinus tachycardia [TextBox_30] : HPI [TextBox_69] : Crohn's colitis not actively on biologic agents

## 2021-03-03 ENCOUNTER — APPOINTMENT (OUTPATIENT)
Dept: PULMONOLOGY | Facility: CLINIC | Age: 46
End: 2021-03-03
Payer: COMMERCIAL

## 2021-03-03 VITALS
HEART RATE: 105 BPM | SYSTOLIC BLOOD PRESSURE: 125 MMHG | TEMPERATURE: 97.8 F | WEIGHT: 195 LBS | OXYGEN SATURATION: 99 % | HEIGHT: 66 IN | DIASTOLIC BLOOD PRESSURE: 79 MMHG | BODY MASS INDEX: 31.34 KG/M2

## 2021-03-03 PROCEDURE — 99214 OFFICE O/P EST MOD 30 MIN: CPT | Mod: 25

## 2021-03-03 PROCEDURE — 99072 ADDL SUPL MATRL&STAF TM PHE: CPT

## 2021-03-03 PROCEDURE — 36415 COLL VENOUS BLD VENIPUNCTURE: CPT

## 2021-03-03 PROCEDURE — 71046 X-RAY EXAM CHEST 2 VIEWS: CPT

## 2021-03-03 RX ORDER — BUDESONIDE AND FORMOTEROL FUMARATE DIHYDRATE 160; 4.5 UG/1; UG/1
160-4.5 AEROSOL RESPIRATORY (INHALATION) TWICE DAILY
Qty: 1 | Refills: 3 | Status: DISCONTINUED | COMMUNITY
Start: 2020-08-14 | End: 2021-03-03

## 2021-03-03 NOTE — HISTORY OF PRESENT ILLNESS
[TextBox_4] : \par 45-year-old female\par Pulmonary evaluation\par Chief complaint chronic chest congestion occasional wheeze with a history of asthma\par Patient states is a longstanding asthma diagnosed onset childhood\par She states that 2018 she was admitted and treated for bilateral pneumonia [facility]\par In addition at that time being treated with antibiotics bronchodilators she underwent a bronchoscopy and had friable airways with copious mucoid secretions\par States since then she feels like a long-term asthma status has been worse\par She describes occasional but chronic wheeze as well as chest tightness chest congestion\par There is some associated shortness of breath\par She also admits there is some component of anxiety\par She is also been monitoring her heart rate with a Fitbit type watch as well as has tachycardia and is undergone cardiac evaluation and was prescribed a beta-blocker for which she has yet picked up\par Denies any sputum production or hemoptysis\par No fevers chills or sweats\par Her other medical history is significant for Crohn's colitis with no treatment for biologic agents\par No reported history of chemical toxic or inhalational exposures\par She states this past week or 2 she is used her rescue inhaler Ventolin well greater than 2-3 times\par She is on Symbicort 160 just 4.52 puffs twice daily\par \par Seasonal allergies reported\par   3 para 3 menopausal\par \par Non-smoker\par No alcohol use\par \par Hospital data review 2018\par CT angiogram\par Negative for pulmonary embolism\par  prior groundglass and tree-in-bud opacities resolved\par \par \par Additional data review 2017 lupus anticoagulant silica clotting time positive\par Prior d-dimer is negative\par 2017 ANCA negative atypical ANCA negative Aspergillus negative Marion antibody negative Mycoplasma IgG +2017 AFB bronchoscopy negative bronchoscopy special stains negative. \par Smoking Status: never \par eCigarettes: never \par Marijuana use: never \par

## 2021-03-03 NOTE — REVIEW OF SYSTEMS
[Cough] : cough [Chest Tightness] : chest tightness [Dyspnea] : dyspnea [Wheezing] : wheezing [SOB on Exertion] : sob on exertion [Palpitations] : palpitations [Seasonal Allergies] : seasonal allergies [Depression] : depression [Anxiety] : anxiety [Negative] : Neurologic [Hemoptysis] : no hemoptysis [Frequent URIs] : no frequent URIs [Sputum] : no sputum [Pleuritic Pain] : no pleuritic pain [A.M. Dry Mouth] : no a.m. dry mouth [Diabetes] : no diabetes [Thyroid Problem] : no thyroid problem [TextBox_30] : HPI [TextBox_44] : Sinus tachycardia [TextBox_69] : Crohn's colitis not actively on biologic agents

## 2021-03-03 NOTE — PROCEDURE
[FreeTextEntry1] : Chest x-ray PA lateral March 3, 2020\par Normal cardiac size\par Clear lungs\par No parenchymal infiltrates pleural effusions or dominant pulmonary nodules\par Soft tissue bony structures unremarkable\par Annalisa mediastinum unremarkable\par No gross interval change compared to chest x-ray orbits 14 2020\par Impression clear lungs\par \par NIOX 14  ppb 9/11/20\par PFT 9/11/2020\par spirometry  normal flow  rates\par lung volumes normal\par normal DLCO\par  HGB 13.1\par \par PFT August 14, 2020\par Normal flow rates\par No response to bronchodilator at the FEV1\par Borderline 17% BD  to bronchodilator at the small airways\par Normal lung volumes\par Diffusion normal 104% of predicted.\par \par NIOX 13 ppb\par Pulmonary 6-minute walk stress test August 14, 2020\par Baseline room air O2 saturation 97% with a heart rate baseline 97 there immediately increased to 115\par Maximum heart rate was 130 at 5 minutes with room air O2 saturation 98%\par Impression sinus tachycardia\par Negative exertional hypoxemia or desaturation\par No indication for portable oxygen therapy\par \par Chest x-ray PA lateral August 14, 2020\par Normal cardiac size\par Lung fields are clear without parenchymal infiltrates pleural effusions or dominant pulmonary nodules\par Soft tissue bony structures grossly unremarkable\par Annalisa mediastinum grossly unremarkable\par Impression clear lungs\par \par \par

## 2021-03-03 NOTE — DISCUSSION/SUMMARY
[FreeTextEntry1] : Asthma flare - prednisone Z MARCELINO\par Chronic persistent asthma\par Normal pulmonary physiology with normal NIOX and 6-minute walk test\par History of mucoid impaction\par Hx prior negative Aspergillus titers\par Sinus tachycardia\par Crohn's colitis without treatment with biologic agent\par  component of anxiety depressive disorder\par \par Due to prior known history of copious secretions mucoid bronchial impaction will add Lama agent\par As per cardiology patient is having a beta-blocker added will need to monitor spirometric data and clinical symptoms\par Office follow-up 1 month\par  continue Symbicort monitor use of Ventolin\par Notify of any wheezing. Notify if rescue inhaler is needed greater than 2-3 times in any week. Avoid known triggers.\par change to Spiriva respimat 1.25 mcg QD- on hold\par Notify of any wheezing.  Notify if rescue inhaler is needed greater than 2-3 times in any week.  Avoid known triggers.\par \par Did receive first dose of Pfizer vaccination without complications although felt little  chest  discomfort\par

## 2021-03-04 ENCOUNTER — NON-APPOINTMENT (OUTPATIENT)
Age: 46
End: 2021-03-04

## 2021-03-04 LAB
ANION GAP SERPL CALC-SCNC: 11 MMOL/L
BASOPHILS # BLD AUTO: 0.06 K/UL
BASOPHILS NFR BLD AUTO: 0.6 %
BUN SERPL-MCNC: 12 MG/DL
CALCIUM SERPL-MCNC: 8.7 MG/DL
CHLORIDE SERPL-SCNC: 104 MMOL/L
CO2 SERPL-SCNC: 23 MMOL/L
CREAT SERPL-MCNC: 0.97 MG/DL
DEPRECATED D DIMER PPP IA-ACNC: <150 NG/ML DDU
EOSINOPHIL # BLD AUTO: 0.16 K/UL
EOSINOPHIL NFR BLD AUTO: 1.5 %
GLUCOSE SERPL-MCNC: 110 MG/DL
HCT VFR BLD CALC: 39.8 %
HGB BLD-MCNC: 13.2 G/DL
IMM GRANULOCYTES NFR BLD AUTO: 0.3 %
LYMPHOCYTES # BLD AUTO: 2.4 K/UL
LYMPHOCYTES NFR BLD AUTO: 23 %
MAN DIFF?: NORMAL
MCHC RBC-ENTMCNC: 29.1 PG
MCHC RBC-ENTMCNC: 33.2 GM/DL
MCV RBC AUTO: 87.7 FL
MONOCYTES # BLD AUTO: 0.69 K/UL
MONOCYTES NFR BLD AUTO: 6.6 %
NEUTROPHILS # BLD AUTO: 7.08 K/UL
NEUTROPHILS NFR BLD AUTO: 68 %
PLATELET # BLD AUTO: 362 K/UL
POTASSIUM SERPL-SCNC: 4 MMOL/L
PROCALCITONIN SERPL-MCNC: 0.02 NG/ML
RBC # BLD: 4.54 M/UL
RBC # FLD: 15.1 %
SODIUM SERPL-SCNC: 138 MMOL/L
WBC # FLD AUTO: 10.42 K/UL

## 2021-03-22 ENCOUNTER — TRANSCRIPTION ENCOUNTER (OUTPATIENT)
Age: 46
End: 2021-03-22

## 2021-04-11 ENCOUNTER — APPOINTMENT (OUTPATIENT)
Dept: DISASTER EMERGENCY | Facility: CLINIC | Age: 46
End: 2021-04-11

## 2021-04-12 LAB — SARS-COV-2 N GENE NPH QL NAA+PROBE: NOT DETECTED

## 2021-04-14 ENCOUNTER — APPOINTMENT (OUTPATIENT)
Dept: PULMONOLOGY | Facility: CLINIC | Age: 46
End: 2021-04-14
Payer: COMMERCIAL

## 2021-04-14 VITALS
OXYGEN SATURATION: 97 % | HEART RATE: 101 BPM | TEMPERATURE: 98.2 F | HEIGHT: 66 IN | RESPIRATION RATE: 16 BRPM | SYSTOLIC BLOOD PRESSURE: 111 MMHG | WEIGHT: 195 LBS | BODY MASS INDEX: 31.34 KG/M2 | DIASTOLIC BLOOD PRESSURE: 77 MMHG

## 2021-04-14 PROCEDURE — 88738 HGB QUANT TRANSCUTANEOUS: CPT

## 2021-04-14 PROCEDURE — 99214 OFFICE O/P EST MOD 30 MIN: CPT | Mod: 25

## 2021-04-14 PROCEDURE — 94727 GAS DIL/WSHOT DETER LNG VOL: CPT

## 2021-04-14 PROCEDURE — 95012 NITRIC OXIDE EXP GAS DETER: CPT

## 2021-04-14 PROCEDURE — 94010 BREATHING CAPACITY TEST: CPT

## 2021-04-14 PROCEDURE — 99072 ADDL SUPL MATRL&STAF TM PHE: CPT

## 2021-04-14 PROCEDURE — 94729 DIFFUSING CAPACITY: CPT

## 2021-04-14 PROCEDURE — ZZZZZ: CPT

## 2021-04-14 RX ORDER — AZITHROMYCIN 250 MG/1
250 TABLET, FILM COATED ORAL
Qty: 1 | Refills: 0 | Status: DISCONTINUED | COMMUNITY
Start: 2021-03-03 | End: 2021-04-14

## 2021-04-14 RX ORDER — PREDNISONE 10 MG/1
10 TABLET ORAL
Qty: 30 | Refills: 1 | Status: DISCONTINUED | COMMUNITY
Start: 2021-03-03 | End: 2021-04-14

## 2021-04-14 NOTE — PROCEDURE
[FreeTextEntry1] : PFT 4/14/21\par Normal flow rates\par Normal lung Volumes Normal DLOCO 99 % pred\par  HGB 13.2\par NIOX 12 ppb 4/ 14 /21\par \par Chest x-ray PA lateral March 3, 2020\par Normal cardiac size\par Clear lungs\par No parenchymal infiltrates pleural effusions or dominant pulmonary nodules\par Soft tissue bony structures unremarkable\par Annalisa mediastinum unremarkable\par No gross interval change compared to chest x-ray orbits 14 2020\par Impression clear lungs\par \par NIOX 14  ppb 9/11/20\par PFT 9/11/2020\par spirometry  normal flow  rates\par lung volumes normal\par normal DLCO\par  HGB 13.1\par \par PFT August 14, 2020\par Normal flow rates\par No response to bronchodilator at the FEV1\par Borderline 17% BD  to bronchodilator at the small airways\par Normal lung volumes\par Diffusion normal 104% of predicted.\par \par NIOX 13 ppb\par Pulmonary 6-minute walk stress test August 14, 2020\par Baseline room air O2 saturation 97% with a heart rate baseline 97 there immediately increased to 115\par Maximum heart rate was 130 at 5 minutes with room air O2 saturation 98%\par Impression sinus tachycardia\par Negative exertional hypoxemia or desaturation\par No indication for portable oxygen therapy\par \par Chest x-ray PA lateral August 14, 2020\par Normal cardiac size\par Lung fields are clear without parenchymal infiltrates pleural effusions or dominant pulmonary nodules\par Soft tissue bony structures grossly unremarkable\par Annalisa mediastinum grossly unremarkable\par Impression clear lungs\par \par \par

## 2021-04-14 NOTE — REVIEW OF SYSTEMS
[Cough] : cough [Chest Tightness] : chest tightness [Dyspnea] : dyspnea [Wheezing] : wheezing [Palpitations] : palpitations [SOB on Exertion] : sob on exertion [Seasonal Allergies] : seasonal allergies [Depression] : depression [Anxiety] : anxiety [Negative] : Neurologic [Hemoptysis] : no hemoptysis [Frequent URIs] : no frequent URIs [Sputum] : no sputum [Pleuritic Pain] : no pleuritic pain [A.M. Dry Mouth] : no a.m. dry mouth [Diabetes] : no diabetes [Thyroid Problem] : no thyroid problem [TextBox_30] : HPI [TextBox_44] : Sinus tachycardia [TextBox_69] : Crohn's colitis not actively on biologic agents

## 2021-04-14 NOTE — HISTORY OF PRESENT ILLNESS
[TextBox_4] : \par 45-year-old female\par Pulmonary evaluation\par Chief complaint chronic chest congestion occasional wheeze with a history of asthma\par Patient states is a longstanding asthma diagnosed onset childhood\par She states that 2018 she was admitted and treated for bilateral pneumonia [facility]\par In addition at that time being treated with antibiotics bronchodilators she underwent a bronchoscopy and had friable airways with copious mucoid secretions\par States since then she feels like a long-term asthma status has been worse\par She describes occasional but chronic wheeze as well as chest tightness chest congestion\par There is some associated shortness of breath\par She also admits there is some component of anxiety\par She is also been monitoring her heart rate with a Fitbit type watch as well as has tachycardia and is undergone cardiac evaluation and was prescribed a beta-blocker for which she has yet picked up\par Denies any sputum production or hemoptysis\par No fevers chills or sweats\par Her other medical history is significant for Crohn's colitis with no treatment for biologic agents\par No reported history of chemical toxic or inhalational exposures\par She states this past week or 2 she is used her rescue inhaler Ventolin well greater than 2-3 times\par She is on Symbicort 160 just 4.52 puffs twice daily\par \par Seasonal allergies reported\par   3 para 3 menopausal\par \par Non-smoker\par No alcohol use\par \par Hospital data review 2018\par CT angiogram\par Negative for pulmonary embolism\par  prior groundglass and tree-in-bud opacities resolved\par \par \par Additional data review 2017 lupus anticoagulant silica clotting time positive\par Prior d-dimer is negative\par 2017 ANCA negative atypical ANCA negative Aspergillus negative Marion antibody negative Mycoplasma IgG +2017 AFB bronchoscopy negative bronchoscopy special stains negative. \par Smoking Status: never \par eCigarettes: never \par Marijuana use: never \par

## 2021-04-14 NOTE — DISCUSSION/SUMMARY
[FreeTextEntry1] : Asthma flare - prednisone Z MARCELINO- excellent response\par Chronic persistent asthma\par Normal pulmonary physiology with normal NIOX and 6-minute walk test\par History of mucoid impaction\par Hx prior negative Aspergillus titers\par Sinus tachycardia\par Crohn's colitis without treatment with biologic agent\par  component of anxiety depressive disorder\par \par As per cardiology patient is having a beta-blocker added will need to monitor spirometric data and clinical symptoms- OFF\par Office follow-up 1 month\par Advair Rx\par Notify of any wheezing. Notify if rescue inhaler is needed greater than 2-3 times in any week. Avoid known triggers.\par change to Spiriva respimat 1.25 mcg QD- on hold\par Notify of any wheezing.  Notify if rescue inhaler is needed greater than 2-3 times in any week.  Avoid known triggers.\par \par  Pfizer vaccination completed\par 1 monthf/u 3 mos PFT NIOX- make decision re any  change or  dose  adjustment to controller therapy\par

## 2021-06-30 ENCOUNTER — APPOINTMENT (OUTPATIENT)
Dept: PULMONOLOGY | Facility: CLINIC | Age: 46
End: 2021-06-30
Payer: COMMERCIAL

## 2021-06-30 VITALS
OXYGEN SATURATION: 97 % | DIASTOLIC BLOOD PRESSURE: 78 MMHG | HEART RATE: 102 BPM | SYSTOLIC BLOOD PRESSURE: 123 MMHG | TEMPERATURE: 97.8 F

## 2021-06-30 PROCEDURE — 99072 ADDL SUPL MATRL&STAF TM PHE: CPT

## 2021-06-30 PROCEDURE — 95012 NITRIC OXIDE EXP GAS DETER: CPT

## 2021-06-30 PROCEDURE — 94010 BREATHING CAPACITY TEST: CPT

## 2021-06-30 PROCEDURE — 99214 OFFICE O/P EST MOD 30 MIN: CPT | Mod: 25

## 2021-06-30 NOTE — HISTORY OF PRESENT ILLNESS
[TextBox_4] : \par 45-year-old female\par Pulmonary evaluation\par Chief complaint chronic chest congestion occasional wheeze with a history of asthma\par Patient states is a longstanding asthma diagnosed onset childhood\par She states that 2018 she was admitted and treated for bilateral pneumonia [facility]\par In addition at that time being treated with antibiotics bronchodilators she underwent a bronchoscopy and had friable airways with copious mucoid secretions\par States since then she feels like a long-term asthma status has been worse\par She describes occasional but chronic wheeze as well as chest tightness chest congestion\par There is some associated shortness of breath\par She also admits there is some component of anxiety\par She is also been monitoring her heart rate with a Fitbit type watch as well as has tachycardia and is undergone cardiac evaluation and was prescribed a beta-blocker for which she has yet picked up\par Denies any sputum production or hemoptysis\par No fevers chills or sweats\par Her other medical history is significant for Crohn's colitis with no treatment for biologic agents\par No reported history of chemical toxic or inhalational exposures\par She states this past week or 2 she is used her rescue inhaler Ventolin well greater than 2-3 times\par She is on Symbicort 160 just 4.52 puffs twice daily\par \par Seasonal allergies reported\par   3 para 3 menopausal\par \par Non-smoker\par No alcohol use\par \par Hospital data review 2018\par CT angiogram\par Negative for pulmonary embolism\par  prior groundglass and tree-in-bud opacities resolved\par \par \par Additional data review 2017 lupus anticoagulant silica clotting time positive\par Prior d-dimer is negative\par 2017 ANCA negative atypical ANCA negative Aspergillus negative Marion antibody negative Mycoplasma IgG +2017 AFB bronchoscopy negative bronchoscopy special stains negative. \par Smoking Status: never \par eCigarettes: never \par Marijuana use: never \par

## 2021-06-30 NOTE — PROCEDURE
[FreeTextEntry1] : NIOX 11 ppb\par  [place] No BD 6/30/21\par Normal flows\par \par \par PFT 4/14/21\par Normal flow rates\par Normal lung Volumes Normal DLCO 99 % pred\par  HGB 13.2\par NIOX 12 ppb 4/ 14 /21\par \par Chest x-ray PA lateral March 3, 2020\par Normal cardiac size\par Clear lungs\par No parenchymal infiltrates pleural effusions or dominant pulmonary nodules\par Soft tissue bony structures unremarkable\par Annalisa mediastinum unremarkable\par No gross interval change compared to chest x-ray orbits 14 2020\par Impression clear lungs\par \par NIOX 14  ppb 9/11/20\par PFT 9/11/2020\par spirometry  normal flow  rates\par lung volumes normal\par normal DLCO\par  HGB 13.1\par \par PFT August 14, 2020\par Normal flow rates\par No response to bronchodilator at the FEV1\par Borderline 17% BD  to bronchodilator at the small airways\par Normal lung volumes\par Diffusion normal 104% of predicted.\par \par NIOX 13 ppb\par Pulmonary 6-minute walk stress test August 14, 2020\par Baseline room air O2 saturation 97% with a heart rate baseline 97 there immediately increased to 115\par Maximum heart rate was 130 at 5 minutes with room air O2 saturation 98%\par Impression sinus tachycardia\par Negative exertional hypoxemia or desaturation\par No indication for portable oxygen therapy\par \par Chest x-ray PA lateral August 14, 2020\par Normal cardiac size\par Lung fields are clear without parenchymal infiltrates pleural effusions or dominant pulmonary nodules\par Soft tissue bony structures grossly unremarkable\par Annalisa mediastinum grossly unremarkable\par Impression clear lungs\par \par \par

## 2021-06-30 NOTE — REVIEW OF SYSTEMS
[Cough] : cough [Chest Tightness] : chest tightness [Dyspnea] : dyspnea [Wheezing] : wheezing [SOB on Exertion] : sob on exertion [Palpitations] : palpitations [Seasonal Allergies] : seasonal allergies [Depression] : depression [Anxiety] : anxiety [Negative] : Neurologic [Hemoptysis] : no hemoptysis [Sputum] : no sputum [Frequent URIs] : no frequent URIs [Pleuritic Pain] : no pleuritic pain [A.M. Dry Mouth] : no a.m. dry mouth [Diabetes] : no diabetes [Thyroid Problem] : no thyroid problem [TextBox_30] : HPI [TextBox_44] : Sinus tachycardia [TextBox_69] : Crohn's colitis not actively on biologic agents

## 2021-06-30 NOTE — DISCUSSION/SUMMARY
[FreeTextEntry1] : Asthma flare -  clinically - Z Raúl / medrol raúl\par Chronic persistent asthma\par Normal pulmonary physiology with normal NIOX and 6-minute walk test\par History of mucoid impaction\par Hx prior negative Aspergillus titers\par Sinus tachycardia\par Crohn's colitis without treatment with biologic agent\par  component of anxiety depressive disorder\par \par As per cardiology patient is having a beta-blocker added will need to monitor spirometric data and clinical symptoms- OFF\par Office follow-up 1 month\par Advair Rx\par Notify of any wheezing. Notify if rescue inhaler is needed greater than 2-3 times in any week. Avoid known triggers.\par change to Spiriva respimat 1.25 mcg QD- on hold\par Notify of any wheezing.  Notify if rescue inhaler is needed greater than 2-3 times in any week.  Avoid known triggers.\par \par  Pfizer vaccination completed\par 1 monthf/u 3 mos PFT NIOX- make decision re any  change or  dose  adjustment to controller therapy\par

## 2021-07-14 ENCOUNTER — APPOINTMENT (OUTPATIENT)
Dept: PULMONOLOGY | Facility: CLINIC | Age: 46
End: 2021-07-14
Payer: COMMERCIAL

## 2021-07-14 VITALS
SYSTOLIC BLOOD PRESSURE: 124 MMHG | DIASTOLIC BLOOD PRESSURE: 80 MMHG | OXYGEN SATURATION: 98 % | HEART RATE: 102 BPM | TEMPERATURE: 98 F

## 2021-07-14 PROCEDURE — 99072 ADDL SUPL MATRL&STAF TM PHE: CPT

## 2021-07-14 PROCEDURE — 94729 DIFFUSING CAPACITY: CPT

## 2021-07-14 PROCEDURE — 88738 HGB QUANT TRANSCUTANEOUS: CPT

## 2021-07-14 PROCEDURE — 94010 BREATHING CAPACITY TEST: CPT

## 2021-07-14 PROCEDURE — 99213 OFFICE O/P EST LOW 20 MIN: CPT | Mod: 25

## 2021-07-14 PROCEDURE — ZZZZZ: CPT

## 2021-07-14 PROCEDURE — 94727 GAS DIL/WSHOT DETER LNG VOL: CPT

## 2021-07-14 RX ORDER — AZITHROMYCIN 250 MG/1
250 TABLET, FILM COATED ORAL
Qty: 1 | Refills: 0 | Status: DISCONTINUED | COMMUNITY
Start: 2021-06-30 | End: 2021-07-14

## 2021-07-14 RX ORDER — METHYLPREDNISOLONE 4 MG/1
4 TABLET ORAL
Qty: 1 | Refills: 0 | Status: DISCONTINUED | COMMUNITY
Start: 2021-06-30 | End: 2021-07-14

## 2021-07-14 NOTE — DISCUSSION/SUMMARY
[FreeTextEntry1] : Asthma flare -  clinically improved- Z Raúl / medrol raúl\par Chronic persistent asthma\par Normal pulmonary physiology with normal NIOX and 6-minute walk test\par History of mucoid impaction\par Hx prior negative Aspergillus titers\par Sinus tachycardia\par Crohn's colitis without treatment with biologic agent\par  component of anxiety depressive disorder\par \par As per cardiology patient is having a beta-blocker added will need to monitor spirometric data and clinical symptoms- OFF\par Office follow-up 1 month\par Advair Rx\par Notify of any wheezing. Notify if rescue inhaler is needed greater than 2-3 times in any week. Avoid known triggers.\par change to Spiriva respimat 1.25 mcg QD- on hold\par Notify of any wheezing.  Notify if rescue inhaler is needed greater than 2-3 times in any week.  Avoid known triggers.\par \par  Pfizer vaccination completed\par 1 monthf/u 3 mos PFT NIOX- make decision re any  change or  dose  adjustment to controller therapy\par

## 2021-07-14 NOTE — PROCEDURE
[FreeTextEntry1] : Zoila bronchodilator July 14, 2021\par Fluids normal\par Metabolic normal\par Diffusion normal\par Interval improvement at flow rates\par \par NIOX 11 ppb\par  [place]rence No BD 6/30/21\par Normal flows\par \par \par PFT 4/14/21\par Normal flow rates\par Normal lung Volumes Normal DLCO 99 % pred\par  HGB 13.2\par NIOX 12 ppb 4/ 14 /21\par \par Chest x-ray PA lateral March 3, 2020\par Normal cardiac size\par Clear lungs\par No parenchymal infiltrates pleural effusions or dominant pulmonary nodules\par Soft tissue bony structures unremarkable\par Annalisa mediastinum unremarkable\par No gross interval change compared to chest x-ray orbits 14 2020\par Impression clear lungs\par \par NIOX 14  ppb 9/11/20\par PFT 9/11/2020\par spirometry  normal flow  rates\par lung volumes normal\par normal DLCO\par  HGB 13.1\par \par PFT August 14, 2020\par Normal flow rates\par No response to bronchodilator at the FEV1\par Borderline 17% BD  to bronchodilator at the small airways\par Normal lung volumes\par Diffusion normal 104% of predicted.\par \par NIOX 13 ppb\par Pulmonary 6-minute walk stress test August 14, 2020\par Baseline room air O2 saturation 97% with a heart rate baseline 97 there immediately increased to 115\par Maximum heart rate was 130 at 5 minutes with room air O2 saturation 98%\par Impression sinus tachycardia\par Negative exertional hypoxemia or desaturation\par No indication for portable oxygen therapy\par \par Chest x-ray PA lateral August 14, 2020\par Normal cardiac size\par Lung fields are clear without parenchymal infiltrates pleural effusions or dominant pulmonary nodules\par Soft tissue bony structures grossly unremarkable\par Annalisa mediastinum grossly unremarkable\par Impression clear lungs\par \par \par

## 2021-10-09 ENCOUNTER — APPOINTMENT (OUTPATIENT)
Dept: DISASTER EMERGENCY | Facility: CLINIC | Age: 46
End: 2021-10-09

## 2021-10-09 LAB — SARS-COV-2 N GENE NPH QL NAA+PROBE: NOT DETECTED

## 2021-10-13 ENCOUNTER — APPOINTMENT (OUTPATIENT)
Dept: PULMONOLOGY | Facility: CLINIC | Age: 46
End: 2021-10-13
Payer: COMMERCIAL

## 2021-10-13 VITALS
TEMPERATURE: 98.2 F | BODY MASS INDEX: 32.14 KG/M2 | HEART RATE: 98 BPM | SYSTOLIC BLOOD PRESSURE: 135 MMHG | RESPIRATION RATE: 14 BRPM | OXYGEN SATURATION: 99 % | WEIGHT: 200 LBS | HEIGHT: 66 IN | DIASTOLIC BLOOD PRESSURE: 80 MMHG

## 2021-10-13 PROCEDURE — 88738 HGB QUANT TRANSCUTANEOUS: CPT

## 2021-10-13 PROCEDURE — 94727 GAS DIL/WSHOT DETER LNG VOL: CPT

## 2021-10-13 PROCEDURE — 94010 BREATHING CAPACITY TEST: CPT

## 2021-10-13 PROCEDURE — 94729 DIFFUSING CAPACITY: CPT

## 2021-10-18 LAB — POCT - HEMOGLOBIN (HGB), QUANTITATIVE, TRANSCUTANEOUS: 13.1

## 2021-10-25 ENCOUNTER — APPOINTMENT (OUTPATIENT)
Dept: PULMONOLOGY | Facility: CLINIC | Age: 46
End: 2021-10-25
Payer: COMMERCIAL

## 2021-10-25 VITALS — TEMPERATURE: 98.2 F | DIASTOLIC BLOOD PRESSURE: 84 MMHG | HEART RATE: 94 BPM | SYSTOLIC BLOOD PRESSURE: 115 MMHG

## 2021-10-25 DIAGNOSIS — Z23 ENCOUNTER FOR IMMUNIZATION: ICD-10-CM

## 2021-10-25 PROCEDURE — G0008: CPT

## 2021-10-25 PROCEDURE — 90686 IIV4 VACC NO PRSV 0.5 ML IM: CPT

## 2021-10-25 PROCEDURE — 99214 OFFICE O/P EST MOD 30 MIN: CPT | Mod: 25

## 2021-10-25 NOTE — HISTORY OF PRESENT ILLNESS
[TextBox_4] : mild inc asthma sxs nocturnal with minimal relief from inhaler\par  told  severe reflux\par \par 45-year-old female\par Pulmonary evaluation\par Chief complaint chronic chest congestion occasional wheeze with a history of asthma\par Patient states is a longstanding asthma diagnosed onset childhood\par She states that 2018 she was admitted and treated for bilateral pneumonia [facility]\par In addition at that time being treated with antibiotics bronchodilators she underwent a bronchoscopy and had friable airways with copious mucoid secretions\par States since then she feels like a long-term asthma status has been worse\par She describes occasional but chronic wheeze as well as chest tightness chest congestion\par There is some associated shortness of breath\par She also admits there is some component of anxiety\par She is also been monitoring her heart rate with a Fitbit type watch as well as has tachycardia and is undergone cardiac evaluation and was prescribed a beta-blocker for which she has yet picked up\par Denies any sputum production or hemoptysis\par No fevers chills or sweats\par Her other medical history is significant for Crohn's colitis with no treatment for biologic agents\par No reported history of chemical toxic or inhalational exposures\par She states this past week or 2 she is used her rescue inhaler Ventolin well greater than 2-3 times\par She is on Symbicort 160 just 4.52 puffs twice daily\par \par Seasonal allergies reported\par   3 para 3 menopausal\par \par Non-smoker\par No alcohol use\par \par Hospital data review 2018\par CT angiogram\par Negative for pulmonary embolism\par  prior groundglass and tree-in-bud opacities resolved\par \par \par Additional data review 2017 lupus anticoagulant silica clotting time positive\par Prior d-dimer is negative\par 2017 ANCA negative atypical ANCA negative Aspergillus negative Marion antibody negative Mycoplasma IgG +2017 AFB bronchoscopy negative bronchoscopy special stains negative. \par Smoking Status: never \par eCigarettes: never \par Marijuana use: never \par

## 2021-10-25 NOTE — PROCEDURE
[FreeTextEntry1] : PFT Oct 13 2021\par Flow rates normal\par Lung volumes normal\par Diffusion normal \par HGB 13.1\par Noted mild decline at DLCO and FVC and FEV1\par \par NIOX 11 ppb\par  Alphonso No BD 6/30/21\par Normal flows\par \par \par PFT 4/14/21\par Normal flow rates\par Normal lung Volumes Normal DLCO 99 % pred\par  HGB 13.2\par NIOX 12 ppb 4/ 14 /21\par \par Chest x-ray PA lateral March 3, 2020\par Normal cardiac size\par Clear lungs\par No parenchymal infiltrates pleural effusions or dominant pulmonary nodules\par Soft tissue bony structures unremarkable\par Annalisa mediastinum unremarkable\par No gross interval change compared to chest x-ray orbits 14 2020\par Impression clear lungs\par \par NIOX 14  ppb 9/11/20\par PFT 9/11/2020\par spirometry  normal flow  rates\par lung volumes normal\par normal DLCO\par  HGB 13.1\par \par PFT August 14, 2020\par Normal flow rates\par No response to bronchodilator at the FEV1\par Borderline 17% BD  to bronchodilator at the small airways\par Normal lung volumes\par Diffusion normal 104% of predicted.\par \par NIOX 13 ppb\par Pulmonary 6-minute walk stress test August 14, 2020\par Baseline room air O2 saturation 97% with a heart rate baseline 97 there immediately increased to 115\par Maximum heart rate was 130 at 5 minutes with room air O2 saturation 98%\par Impression sinus tachycardia\par Negative exertional hypoxemia or desaturation\par No indication for portable oxygen therapy\par \par Chest x-ray PA lateral August 14, 2020\par Normal cardiac size\par Lung fields are clear without parenchymal infiltrates pleural effusions or dominant pulmonary nodules\par Soft tissue bony structures grossly unremarkable\par Annalisa mediastinum grossly unremarkable\par Impression clear lungs\par \par \par

## 2021-10-25 NOTE — DISCUSSION/SUMMARY
[FreeTextEntry1] : Asthma flare -  clinically improved- Z Raúl / medrol raúl- trial BREXTRI with sample  consider singulair if nec\par discuss re GERD prilosec + Pepcid\par Chronic persistent asthma\par Normal pulmonary physiology with normal NIOX and 6-minute walk test\par History of mucoid impaction\par Hx prior negative Aspergillus titers\par Sinus tachycardia\par Crohn's colitis without treatment with biologic agent\par  component of anxiety depressive disorder\par \par As per cardiology patient is having a beta-blocker added will need to monitor spirometric data and clinical symptoms- OFF\par Office follow-up 1 month\par Advair Rx- HOLD Trial BREZTRI 2 puffs BID\par Notify of any wheezing. Notify if rescue inhaler is needed greater than 2-3 times in any week. Avoid known triggers.\par change to Spiriva respimat 1.25 mcg QD- on hold\par Notify of any wheezing.  Notify if rescue inhaler is needed greater than 2-3 times in any week.  Avoid known triggers.\par \par  Pfizer vaccination completed\par 1 monthf/u 3 mos PFT NIOX- make decision re any  change or  dose  adjustment to controller therapy\par

## 2021-12-28 ENCOUNTER — OUTPATIENT (OUTPATIENT)
Dept: OUTPATIENT SERVICES | Facility: HOSPITAL | Age: 46
LOS: 1 days | End: 2021-12-28
Payer: COMMERCIAL

## 2021-12-28 ENCOUNTER — RESULT REVIEW (OUTPATIENT)
Age: 46
End: 2021-12-28

## 2021-12-28 VITALS
OXYGEN SATURATION: 97 % | HEART RATE: 105 BPM | TEMPERATURE: 98 F | WEIGHT: 197.98 LBS | HEIGHT: 66 IN | SYSTOLIC BLOOD PRESSURE: 122 MMHG | DIASTOLIC BLOOD PRESSURE: 86 MMHG | RESPIRATION RATE: 16 BRPM

## 2021-12-28 DIAGNOSIS — Z86.79 PERSONAL HISTORY OF OTHER DISEASES OF THE CIRCULATORY SYSTEM: ICD-10-CM

## 2021-12-28 DIAGNOSIS — N84.0 POLYP OF CORPUS UTERI: ICD-10-CM

## 2021-12-28 DIAGNOSIS — Z98.890 OTHER SPECIFIED POSTPROCEDURAL STATES: Chronic | ICD-10-CM

## 2021-12-28 LAB
ANION GAP SERPL CALC-SCNC: 14 MMOL/L — SIGNIFICANT CHANGE UP (ref 5–17)
BLD GP AB SCN SERPL QL: NEGATIVE — SIGNIFICANT CHANGE UP
BUN SERPL-MCNC: 10 MG/DL — SIGNIFICANT CHANGE UP (ref 7–23)
CALCIUM SERPL-MCNC: 8.9 MG/DL — SIGNIFICANT CHANGE UP (ref 8.4–10.5)
CHLORIDE SERPL-SCNC: 103 MMOL/L — SIGNIFICANT CHANGE UP (ref 96–108)
CO2 SERPL-SCNC: 21 MMOL/L — LOW (ref 22–31)
CREAT SERPL-MCNC: 0.94 MG/DL — SIGNIFICANT CHANGE UP (ref 0.5–1.3)
GLUCOSE SERPL-MCNC: 82 MG/DL — SIGNIFICANT CHANGE UP (ref 70–99)
HCT VFR BLD CALC: 40.1 % — SIGNIFICANT CHANGE UP (ref 34.5–45)
HGB BLD-MCNC: 13 G/DL — SIGNIFICANT CHANGE UP (ref 11.5–15.5)
MCHC RBC-ENTMCNC: 28.8 PG — SIGNIFICANT CHANGE UP (ref 27–34)
MCHC RBC-ENTMCNC: 32.4 GM/DL — SIGNIFICANT CHANGE UP (ref 32–36)
MCV RBC AUTO: 88.7 FL — SIGNIFICANT CHANGE UP (ref 80–100)
NRBC # BLD: 0 /100 WBCS — SIGNIFICANT CHANGE UP (ref 0–0)
PLATELET # BLD AUTO: 338 K/UL — SIGNIFICANT CHANGE UP (ref 150–400)
POTASSIUM SERPL-MCNC: 3.8 MMOL/L — SIGNIFICANT CHANGE UP (ref 3.5–5.3)
POTASSIUM SERPL-SCNC: 3.8 MMOL/L — SIGNIFICANT CHANGE UP (ref 3.5–5.3)
RBC # BLD: 4.52 M/UL — SIGNIFICANT CHANGE UP (ref 3.8–5.2)
RBC # FLD: 13.9 % — SIGNIFICANT CHANGE UP (ref 10.3–14.5)
RH IG SCN BLD-IMP: NEGATIVE — SIGNIFICANT CHANGE UP
SODIUM SERPL-SCNC: 138 MMOL/L — SIGNIFICANT CHANGE UP (ref 135–145)
WBC # BLD: 8.18 K/UL — SIGNIFICANT CHANGE UP (ref 3.8–10.5)
WBC # FLD AUTO: 8.18 K/UL — SIGNIFICANT CHANGE UP (ref 3.8–10.5)

## 2021-12-28 PROCEDURE — 80048 BASIC METABOLIC PNL TOTAL CA: CPT

## 2021-12-28 PROCEDURE — 86850 RBC ANTIBODY SCREEN: CPT

## 2021-12-28 PROCEDURE — G0463: CPT

## 2021-12-28 PROCEDURE — 86900 BLOOD TYPING SEROLOGIC ABO: CPT

## 2021-12-28 PROCEDURE — 85027 COMPLETE CBC AUTOMATED: CPT

## 2021-12-28 PROCEDURE — 86901 BLOOD TYPING SEROLOGIC RH(D): CPT

## 2021-12-28 RX ORDER — OMEPRAZOLE 10 MG/1
1 CAPSULE, DELAYED RELEASE ORAL
Qty: 0 | Refills: 0 | DISCHARGE

## 2021-12-28 RX ORDER — LIDOCAINE HCL 20 MG/ML
0.2 VIAL (ML) INJECTION ONCE
Refills: 0 | Status: DISCONTINUED | OUTPATIENT
Start: 2022-01-07 | End: 2022-01-21

## 2021-12-28 RX ORDER — POLYETHYLENE GLYCOL 3350 17 G/17G
1 POWDER, FOR SOLUTION ORAL
Qty: 0 | Refills: 0 | DISCHARGE

## 2021-12-28 RX ORDER — MERCAPTOPURINE 50 MG/1
1.5 TABLET ORAL
Qty: 0 | Refills: 0 | DISCHARGE

## 2021-12-28 RX ORDER — SODIUM CHLORIDE 9 MG/ML
3 INJECTION INTRAMUSCULAR; INTRAVENOUS; SUBCUTANEOUS EVERY 8 HOURS
Refills: 0 | Status: DISCONTINUED | OUTPATIENT
Start: 2022-01-07 | End: 2022-01-21

## 2021-12-28 NOTE — H&P PST ADULT - OTHER CARE PROVIDERS
Dr. LAUREEN Brar [phone] (pulm), Dr. Jw Garay [phone] (cardio) - clearance appt 12/29/21

## 2021-12-28 NOTE — H&P PST ADULT - NSICDXPASTMEDICALHX_GEN_ALL_CORE_FT
PAST MEDICAL HISTORY:  Anxiety with depression     Asthma     Crohn's disease - in remission, no meds    GERD (gastroesophageal reflux disease)     OCD (obsessive compulsive disorder)     Pneumonia -  b/l, 2017    Polyp of corpus uteri      PAST MEDICAL HISTORY:  Anxiety with depression     Asthma     Crohn's disease - in remission, no meds    GERD (gastroesophageal reflux disease)     OCD (obsessive compulsive disorder)     TAMMIE (obstructive sleep apnea) -  mild, no treatment    Pneumonia -  b/l, 2017    Polyp of corpus uteri

## 2021-12-28 NOTE — H&P PST ADULT - HISTORY OF PRESENT ILLNESS
46 y.o.  with h/o depression, anxiety, GERD, tachycardia c/o heavy periods with clots for the past 2 months. Pelvic US revealed uterine polyp. She is scheduled for D&C, Diagnostic Hysteroscopy, Polypectomy on 22.     Covid testing 22, denies recent infection or exposure.

## 2021-12-28 NOTE — H&P PST ADULT - ANESTHESIA, PREVIOUS REACTION, PROFILE
Pt states she stopped breathing at home after she took demerol s/p wisdom tooth extraction several years ago (lost consciousness in the bathroom), not sure that  was from Demerol.

## 2021-12-28 NOTE — H&P PST ADULT - DOES THIS PATIENT HAVE A HISTORY OF OR HAS BEEN DX WITH HEART FAILURE?
"Patietn refusing discharge stating "you cannot send me home in the same amount of pain as when I got here". LUAN Hernandez notified.  " no

## 2022-01-04 ENCOUNTER — OUTPATIENT (OUTPATIENT)
Dept: OUTPATIENT SERVICES | Facility: HOSPITAL | Age: 47
LOS: 1 days | End: 2022-01-04
Payer: COMMERCIAL

## 2022-01-04 DIAGNOSIS — Z11.52 ENCOUNTER FOR SCREENING FOR COVID-19: ICD-10-CM

## 2022-01-04 DIAGNOSIS — Z98.890 OTHER SPECIFIED POSTPROCEDURAL STATES: Chronic | ICD-10-CM

## 2022-01-04 LAB — SARS-COV-2 RNA SPEC QL NAA+PROBE: SIGNIFICANT CHANGE UP

## 2022-01-04 PROCEDURE — U0005: CPT

## 2022-01-04 PROCEDURE — C9803: CPT

## 2022-01-04 PROCEDURE — U0003: CPT

## 2022-01-06 ENCOUNTER — TRANSCRIPTION ENCOUNTER (OUTPATIENT)
Age: 47
End: 2022-01-06

## 2022-01-07 ENCOUNTER — OUTPATIENT (OUTPATIENT)
Dept: OUTPATIENT SERVICES | Facility: HOSPITAL | Age: 47
LOS: 1 days | End: 2022-01-07
Payer: COMMERCIAL

## 2022-01-07 ENCOUNTER — RESULT REVIEW (OUTPATIENT)
Age: 47
End: 2022-01-07

## 2022-01-07 VITALS
HEART RATE: 92 BPM | SYSTOLIC BLOOD PRESSURE: 120 MMHG | TEMPERATURE: 98 F | DIASTOLIC BLOOD PRESSURE: 64 MMHG | RESPIRATION RATE: 16 BRPM | OXYGEN SATURATION: 98 %

## 2022-01-07 VITALS
OXYGEN SATURATION: 98 % | HEART RATE: 104 BPM | SYSTOLIC BLOOD PRESSURE: 141 MMHG | DIASTOLIC BLOOD PRESSURE: 84 MMHG | RESPIRATION RATE: 14 BRPM | HEIGHT: 66 IN | TEMPERATURE: 99 F | WEIGHT: 197.98 LBS

## 2022-01-07 DIAGNOSIS — N84.0 POLYP OF CORPUS UTERI: ICD-10-CM

## 2022-01-07 DIAGNOSIS — Z98.890 OTHER SPECIFIED POSTPROCEDURAL STATES: Chronic | ICD-10-CM

## 2022-01-07 LAB — RH IG SCN BLD-IMP: NEGATIVE — SIGNIFICANT CHANGE UP

## 2022-01-07 PROCEDURE — 88305 TISSUE EXAM BY PATHOLOGIST: CPT | Mod: 26

## 2022-01-07 PROCEDURE — 88305 TISSUE EXAM BY PATHOLOGIST: CPT

## 2022-01-07 PROCEDURE — 58558 HYSTEROSCOPY BIOPSY: CPT

## 2022-01-07 RX ORDER — DILTIAZEM HCL 120 MG
1 CAPSULE, EXT RELEASE 24 HR ORAL
Qty: 0 | Refills: 0 | DISCHARGE

## 2022-01-07 RX ORDER — FENTANYL CITRATE 50 UG/ML
25 INJECTION INTRAVENOUS
Refills: 0 | Status: DISCONTINUED | OUTPATIENT
Start: 2022-01-07 | End: 2022-01-07

## 2022-01-07 RX ORDER — ONDANSETRON 8 MG/1
4 TABLET, FILM COATED ORAL ONCE
Refills: 0 | Status: DISCONTINUED | OUTPATIENT
Start: 2022-01-07 | End: 2022-01-21

## 2022-01-07 RX ORDER — ROSUVASTATIN CALCIUM 5 MG/1
1 TABLET ORAL
Qty: 0 | Refills: 0 | DISCHARGE

## 2022-01-07 RX ORDER — BUPROPION HYDROCHLORIDE 150 MG/1
1 TABLET, EXTENDED RELEASE ORAL
Qty: 0 | Refills: 0 | DISCHARGE

## 2022-01-07 RX ORDER — IBUPROFEN 200 MG
1 TABLET ORAL
Qty: 0 | Refills: 0 | DISCHARGE

## 2022-01-07 RX ORDER — OXYCODONE HYDROCHLORIDE 5 MG/1
5 TABLET ORAL ONCE
Refills: 0 | Status: DISCONTINUED | OUTPATIENT
Start: 2022-01-07 | End: 2022-01-07

## 2022-01-07 RX ORDER — OMEPRAZOLE 10 MG/1
1 CAPSULE, DELAYED RELEASE ORAL
Qty: 0 | Refills: 0 | DISCHARGE

## 2022-01-07 RX ORDER — ACETAMINOPHEN 500 MG
3 TABLET ORAL
Qty: 0 | Refills: 0 | DISCHARGE

## 2022-01-07 RX ORDER — BUDESONIDE, GLYCOPYRROLATE, AND FORMOTEROL FUMARATE 160; 9; 4.8 UG/1; UG/1; UG/1
2 AEROSOL, METERED RESPIRATORY (INHALATION)
Qty: 0 | Refills: 0 | DISCHARGE

## 2022-01-07 NOTE — ASU DISCHARGE PLAN (ADULT/PEDIATRIC) - NURSING INSTRUCTIONS
Next dose of Tylenol will be on or after ________3:54 PM___ ,today/tonight and every 6 hours afterwards as needed for pain management, do not take any Tylenol containing products until this time. Your first dose of Tylenol was given at ____9:54 AM_______. Do not exceed more than 4000mg of Tylenol in one 24 hour setting. If no contraindications, you may alternate with Ibuprofen 3 hours after dose of Tylenol. Ibuprofen can be taken every 6 hours.

## 2022-01-07 NOTE — BRIEF OPERATIVE NOTE - OPERATION/FINDINGS
EUA: Exam under anesthesia revealed a small anteverted uterus approximately 8 weeks in size. No masses palpated in the adnexa bilaterally. Cervix dilated to 5.5mm for hysteroscope and then to 8.5mm for curettage  Hysteroscopy: Hysteroscopic exam revealed a small uterine cavity with polypoid tissue. Ostia located bilaterally

## 2022-01-07 NOTE — ASU DISCHARGE PLAN (ADULT/PEDIATRIC) - CARE PROVIDER_API CALL
Jeni García)  Obstetrics and Gynecology  [address], [address]  [place] [address]  Phone: [phone]  Fax: [phone]  Follow Up Time: Routine

## 2022-01-07 NOTE — ASU PATIENT PROFILE, ADULT - NSICDXPASTMEDICALHX_GEN_ALL_CORE_FT
PAST MEDICAL HISTORY:  Anxiety with depression     Asthma     Crohn's disease - in remission, no meds    GERD (gastroesophageal reflux disease)     OCD (obsessive compulsive disorder)     TAMMIE (obstructive sleep apnea) -  mild, no treatment    Pneumonia -  b/l, 2017    Polyp of corpus uteri

## 2022-01-07 NOTE — ASU DISCHARGE PLAN (ADULT/PEDIATRIC) - NS MD DC FALL RISK RISK
For information on Fall & Injury Prevention, visit: [email or web address] OR  [email or web address] OR  https://www.cdc.gov/steadi/patient.html

## 2022-01-07 NOTE — ASU PATIENT PROFILE, ADULT - WILL THE PATIENT ACCEPT THE PFIZER COVID-19 VACCINE IF ELIGIBLE AND IT IS AVAILABLE?
[facility] Consultation / Initial Visit      Today's Date: 12/2/2021    PCP: Baldomero Oneil CNP  Referring Provider: Mike Madsen MD    INDICATION FOR CONSULTATION: pre-op risk assessment, known acute dvt on heparin gtt    PATIENT IS POOR HISTORIAN. ADDITIONAL HISTORY OBTAINED FROM CHART REVIEW  History of Present Illness:       89 year old male with chief complaint of leg weakness and history of recurrent falls, hypertension, diabetes, cataract. Patient is a poor historian and additional information was taken from chart review.       ROS: 12point ROS negative other than as mentioned in HPI    Relevant Past Medical History:  Past Medical History:   Diagnosis Date   • Arthritis    • Cataract    • Diabetes mellitus (CMS/HCC)    • Encounter for attention to other artificial openings of digestive tract (CMS/HCC) 3/20/2021   • Essential (primary) hypertension    • Gall stones 8/9/2016      Past Surgical History:   Procedure Laterality Date   • Prostate surgery          Social History:  Social History     Tobacco Use   Smoking Status Never Smoker   Smokeless Tobacco Never Used     Social History     Substance and Sexual Activity   Alcohol Use No     History   Drug Use Unknown       Family History:   Family History   Problem Relation Age of Onset   • Cancer, Prostate Neg Hx        Inpatient Medications  • aspirin  81 mg Oral Daily   • atorvastatin  20 mg Oral Nightly   • famotidine  20 mg Intravenous Daily   • Potassium Standard Replacement Protocol   Does not apply See Admin Instructions   • Magnesium Standard Replacement Protocol   Does not apply See Admin Instructions   • insulin lispro   Subcutaneous TID WC   • insulin lispro   Subcutaneous Nightly   • pantoprazole  40 mg Oral QAM AC   • lisinopril  2.5 mg Oral Daily      • heparin (porcine) 25,000 units/250 mL in dextrose 5 % infusion 11 Units/kg/hr (12/02/21 0109)      morphine injection, heparin (porcine), heparin (porcine), LORazepam, melatonin,  hydrALAZINE, acetaminophen **OR** acetaminophen, labetalol, ondansetron **OR** ondansetron, prochlorperazine **OR** prochlorperazine, polyethylene glycol, docusate sodium-sennosides, aluminum-magnesium hydroxide-simethicone, sodium chloride, dextrose, dextrose, glucagon, dextrose, dextrose     Allergy   ALLERGIES:  No Known Allergies         Examination:      Vital Last Value 24 Hour Range   Temperature 98.1 °F (36.7 °C) (12/02/21 1140) Temp  Min: 97.5 °F (36.4 °C)  Max: 98.8 °F (37.1 °C)   Pulse 66 (12/02/21 1155) Pulse  Min: 62  Max: 84   Respiratory 19 (12/02/21 1155) Resp  Min: 16  Max: 20   Non-Invasive  Blood Pressure 116/71 (12/02/21 1155) BP  Min: 102/69  Max: 151/86   Pulse Oximetry 100 % (12/02/21 1155) SpO2  Min: 97 %  Max: 100 %   Arterial   Blood Pressure   No data recorded           No intake or output data in the 24 hours ending 12/02/21 1209     Weight    11/29/21 1700 11/29/21 1818 11/30/21 0733   Weight: 82.5 kg (181 lb 14.4 oz) 84.5 kg (186 lb 4.6 oz) 84.4 kg (186 lb)       Neuro: Awake and alert.  Nonfocal examination.  Psych: Elderly man slightly combative  Constitutional: No apparent distress. Normocephalic.  Eyes: PERRL, EOMI.   EENT: Oral mucosa pink and moist.  Supple neck. There is no goiter.  CVS:  Regular rate and rhythm.  S1S2  Resp: Clear to auscultation bilaterally.   GI: Soft. Nontender, nondistended.    Musculoskeletal: Normal range of motion, no deformitieis   Hem/Lymph/Imm: No cyanosis, clubbing or edema. Both lower extremities are warm and well perfused.    Integumentary: Warm and Dry      MEDICAL DECISION MAKING:       The following were personally ordered, visualized and interpreted by me:  Tele: sinus 1st degree heart block    LABS:    CBC  Recent Labs   Lab 12/02/21  0716 12/01/21  0806 11/30/21  0638   WBC 7.9 8.9 8.1   HCT 39.3 39.9 40.2   HGB 12.7* 12.7* 12.9*    187 201       CMP  Recent Labs   Lab 12/02/21  0716 12/01/21  0806 11/30/21  0638 11/29/21  1918  11/29/21  1917   SODIUM 140 140 139   < > 140   POTASSIUM 4.0 4.1 3.8   < > 3.8   CHLORIDE 104 104 105   < > 105   CO2 29 25 28   < > 28   GLUCOSE 137* 161* 150*   < > 143*   BUN 17 16 15   < > 19   CREATININE 0.95 0.97 0.90   < > 0.85   CALCIUM 8.4 8.9 8.6   < > 8.6   TOTPROTEIN  --   --   --   --  7.5   ALBUMIN  --   --   --   --  2.6*   BILIRUBIN  --   --   --   --  0.7   AST  --   --   --   --  46*   GPT  --   --   --   --  23   ALKPT  --   --   --   --  87    < > = values in this interval not displayed.       Cardiac Labs  Recent Labs   Lab 11/30/21  0638   TSH 0.691       Lipid Panel  Recent Labs   Lab 11/30/21  0638   CHOLESTEROL 111   HDL 44   CALCLDL 59   TRIGLYCERIDE 42       Coags  Recent Labs   Lab 12/02/21  0716 12/01/21  2336 12/01/21  1732 11/30/21  2308 11/30/21  1544   INR  --   --   --   --  1.1   PTT 55* 67* 65*   < > 27    < > = values in this interval not displayed.       ABG  No results found    IMAGING:    ECG  No results found for this or any previous visit (from the past 4464 hour(s)).     TRANSTHORACIC ECHO (TTE) COMPLETE W/ W/O IMAGING AGENT      Patient: Shan Qiu   Study Date/Time:        Nov 30 2021 9:02AM  -----------------------------------  INDICATIONS:   Cerebrovascular accident.    --------------------------------------------------------------------------  STUDY CONCLUSIONS  SUMMARY:    1. Left ventricle: The cavity size is normal. Wall thickness is mildly  increased. The ejection fraction was measured by 3D assessment. Doppler  parameters are consistent with abnormal left ventricular relaxation  (grade 1 diastolic dysfunction). The ejection fraction is 60%.  2. Aortic valve: Mild regurgitation.  3. Ascending aorta: The ascending aorta is mildly dilated.  4. Right ventricle: Systolic function is normal.  5. Atrial septum: Agitated saline contrast study shows no shunt.    --------------------------------------------------------------------------    Imaging Last 24hrs   CT  ABDOMEN PELVIS WO CONTRAST    Result Date: 12/1/2021  EXAM: CT ABDOMEN PELVIS WO CONTRAST CLINICAL INDICATION: Abdominal distention. COMPARISON: CT abdomen/pelvis on 11/30/2021 TECHNIQUE:  CT of the abdomen and pelvis was performed utilizing standard protocol without administration of intravenous contrast material.   Axial, sagittal and coronal imaging planes were utilized.  CONTRAST:  No IV contrast was administered. Oral contrast: Was given FINDINGS: The lack of intravenous contrast makes the evaluation of visceral organ suboptimal, respectively. Lung bases: Calcified granulomas in the lungs Interval development of diffuse dilatation of the colonic loops with transition point identified at the sigmoid colon with decompressed distal sigmoid colon.  There is a somewhat bird's peak appearance of the sigmoid colon (series 2, images 62-68).  The colon appears diffusely redundant. There is also possible transition seen at the descending colon.  Dilatation is also seen proximally in the ascending colon, cecum, and the transverse colon.  Contrast is still seen in the proximal colon.  Please note transverse colon is dilated up to 7.0 cm in the ascending colon is dilated up to 6.3 cm. Liver: Unremarkable Pancreas: Unremarkable Spleen: Unremarkable Adrenal Glands: Unremarkable Gallbladder:Present. Appendix is visualized and unremarkable.  Kidneys: No obstructive uropathy. Hypodense lesions seen in the lower pole of the right kidney. Urinary bladder: Grossly unremarkable OSSEOUS STRUCTURES: Diffuse heterogeneous or mottled appearance of the spine with multiple lytic areas identified most pronounced at L3, L4 and L5. Other findings: Atherosclerosis of the abdominal aorta through the peripheral vasculature..  Other findings: Patulous appearance of the distal esophagus which can be seen in reflux.     1.   Limited examination without intravenous contrast. 2.   Interval development of diffuse dilatation of large portions of the  colon with transition seen at the sigmoid colon and a second possible area of transition also seen at the descending colon.  Findings are concerning for developing or early colonic obstruction with developing or partially obstructed sigmoid volvulus in the differential (i.e. there is a somewhat bird's peak appearance of the sigmoid colon (please refer to series 2, images 62-68)).  Contrast is still seen in the proximal colon.  Findings notified via perfect serve message to Dr. Ant Leigh and KEVIN Mendoza at 11:24 PM on 12/01/2021. 3.   Diffuse heterogeneous or mottled appearance of the spine.  Underlying infiltrative process cannot be excluded.  Recommend further evaluation with bone scan examination. 4.   Findings may represent renal cysts in the right kidney.  Recommend nonemergent renal ultrasound for confirmation and to exclude other etiologies. 5.   Otherwise no significant change. FOR PHYSICIAN USE ONLY - Please note that this report was generated using voice recognition software.  If you require clarification or feel that there has been an error in this report please contact me through Perfect Serve.  Thank you very much for allowing me to participate in the care of your patient. Electronically Signed by: ROMEL QUIGLEY Signed on: 12/1/2021 11:37 PM          Assessment/Plans:     DVT  -started on heparin  -plan for proceudre  -would hold heparin in periprocedural time and resume heparin with transition to NOAC    Weakness  -patient would benefit from PT/OT consult and evaluation    Atherosclerotic Disease of Abdominal Aorta  -continue medical therapy with asa and statin      Dispo  Cardiovascular status stable post procedure  Will sign off at this time but remain available  Please do not hesitate to call with any questions or concerns should clinical cirumstances change.    Juan Miguel Lux MD   Not applicable

## 2022-01-07 NOTE — ASU PREOP CHECKLIST - HAIR REMOVAL
Hemalatha Duncan is a 69 y.o. female here this morning for a follow up on her HTN. She says sometimes she feels anxious and dizzy. She denies chest pain or SOB but says sometimes her breathing changes due to feeling anxious. Learning assessment previously completed. 1. Have you been to the ER, urgent care clinic or hospitalized since your last visit? no    2. Have you seen or consulted any other health care providers outside of the [address] [place] [address] since your last visit (Include any pap smears or colon screening)? Dr. Bia Dawson in November for cervical spine injections     Do you have an Advanced Directive? no    Would you like information on Advanced Directives?  Has paper work hair removal not indicated

## 2022-01-07 NOTE — ASU PATIENT PROFILE, ADULT - FALL HARM RISK - UNIVERSAL INTERVENTIONS
Bed in lowest position, wheels locked, appropriate side rails in place/Call bell, personal items and telephone in reach/Instruct patient to call for assistance before getting out of bed or chair/Non-slip footwear when patient is out of bed/[place] to call system/Physically safe environment - no spills, clutter or unnecessary equipment/Purposeful Proactive Rounding/Room/bathroom lighting operational, light cord in reach

## 2022-01-07 NOTE — ASU DISCHARGE PLAN (ADULT/PEDIATRIC) - CALL YOUR DOCTOR IF YOU HAVE ANY OF THE FOLLOWING:
Bleeding that does not stop/Swelling that gets worse/Pain not relieved by Medications/Fever greater than (need to indicate Fahrenheit or Celsius)/Nausea and vomiting that does not stop/Unable to urinate/Excessive diarrhea/Inability to tolerate liquids or foods/Increased irritability or sluggishness Bleeding that does not stop/Swelling that gets worse/Pain not relieved by Medications/Fever greater than (need to indicate Fahrenheit or Celsius)/Wound/Surgical Site with redness, or foul smelling discharge or pus/Nausea and vomiting that does not stop/Unable to urinate/Excessive diarrhea/Inability to tolerate liquids or foods/Increased irritability or sluggishness

## 2022-01-11 LAB — SURGICAL PATHOLOGY STUDY: SIGNIFICANT CHANGE UP

## 2022-02-02 ENCOUNTER — APPOINTMENT (OUTPATIENT)
Dept: PULMONOLOGY | Facility: CLINIC | Age: 47
End: 2022-02-02
Payer: COMMERCIAL

## 2022-02-02 VITALS
HEART RATE: 107 BPM | DIASTOLIC BLOOD PRESSURE: 86 MMHG | TEMPERATURE: 97.6 F | SYSTOLIC BLOOD PRESSURE: 143 MMHG | OXYGEN SATURATION: 99 %

## 2022-02-02 DIAGNOSIS — Z01.812 ENCOUNTER FOR PREPROCEDURAL LABORATORY EXAMINATION: ICD-10-CM

## 2022-02-02 PROCEDURE — 99213 OFFICE O/P EST LOW 20 MIN: CPT

## 2022-02-02 RX ORDER — FLUTICASONE PROPIONATE AND SALMETEROL XINAFOATE 115; 21 UG/1; UG/1
115-21 AEROSOL, METERED RESPIRATORY (INHALATION)
Qty: 1 | Refills: 3 | Status: DISCONTINUED | COMMUNITY
Start: 2021-02-01 | End: 2022-02-02

## 2022-02-02 NOTE — HISTORY OF PRESENT ILLNESS
[TextBox_4] : mild inc asthma sxs nocturnal wityh improvement with BREZTRI 2  puffs BID\par  told  severe reflux\par \par 45-year-old female\par Pulmonary evaluation\par Chief complaint chronic chest congestion occasional wheeze with a history of asthma\par Patient states is a longstanding asthma diagnosed onset childhood\par She states that 2018 she was admitted and treated for bilateral pneumonia [facility]\par In addition at that time being treated with antibiotics bronchodilators she underwent a bronchoscopy and had friable airways with copious mucoid secretions\par States since then she feels like a long-term asthma status has been worse\par She describes occasional but chronic wheeze as well as chest tightness chest congestion\par There is some associated shortness of breath\par She also admits there is some component of anxiety\par She is also been monitoring her heart rate with a Fitbit type watch as well as has tachycardia and is undergone cardiac evaluation and was prescribed a beta-blocker for which she has yet picked up\par Denies any sputum production or hemoptysis\par No fevers chills or sweats\par Her other medical history is significant for Crohn's colitis with no treatment for biologic agents\par No reported history of chemical toxic or inhalational exposures\par She states this past week or 2 she is used her rescue inhaler Ventolin well greater than 2-3 times\par She is on Symbicort 160 just 4.52 puffs twice daily\par \par Seasonal allergies reported\par   3 para 3 menopausal\par \par Non-smoker\par No alcohol use\par \par Hospital data review 2018\par CT angiogram\par Negative for pulmonary embolism\par  prior groundglass and tree-in-bud opacities resolved\par \par \par Additional data review 2017 lupus anticoagulant silica clotting time positive\par Prior d-dimer is negative\par 2017 ANCA negative atypical ANCA negative Aspergillus negative Marion antibody negative Mycoplasma IgG +2017 AFB bronchoscopy negative bronchoscopy special stains negative. \par Smoking Status: never \par eCigarettes: never \par Marijuana use: never \par

## 2022-02-02 NOTE — PROCEDURE
[FreeTextEntry1] : PFT Oct 13 2021\par Flow rates normal\par Lung volumes normal\par Diffusion normal \par HGB 13.1\par Noted mild decline at DLCO and FVC and FEV1\par \par NIOX 11 ppb\par  [place] No BD 6/30/21\par Normal flows\par \par \par PFT 4/14/21\par Normal flow rates\par Normal lung Volumes Normal DLCO 99 % pred\par  HGB 13.2\par NIOX 12 ppb 4/ 14 /21\par \par Chest x-ray PA lateral March 3, 2020\par Normal cardiac size\par Clear lungs\par No parenchymal infiltrates pleural effusions or dominant pulmonary nodules\par Soft tissue bony structures unremarkable\par Annalsia mediastinum unremarkable\par No gross interval change compared to chest x-ray orbits 14 2020\par Impression clear lungs\par \par NIOX 14  ppb 9/11/20\par PFT 9/11/2020\par spirometry  normal flow  rates\par lung volumes normal\par normal DLCO\par  HGB 13.1\par \par PFT August 14, 2020\par Normal flow rates\par No response to bronchodilator at the FEV1\par Borderline 17% BD  to bronchodilator at the small airways\par Normal lung volumes\par Diffusion normal 104% of predicted.\par \par NIOX 13 ppb\par Pulmonary 6-minute walk stress test August 14, 2020\par Baseline room air O2 saturation 97% with a heart rate baseline 97 there immediately increased to 115\par Maximum heart rate was 130 at 5 minutes with room air O2 saturation 98%\par Impression sinus tachycardia\par Negative exertional hypoxemia or desaturation\par No indication for portable oxygen therapy\par \par Chest x-ray PA lateral August 14, 2020\par Normal cardiac size\par Lung fields are clear without parenchymal infiltrates pleural effusions or dominant pulmonary nodules\par Soft tissue bony structures grossly unremarkable\par Annalisa mediastinum grossly unremarkable\par Impression clear lungs\par \par \par

## 2022-02-03 PROBLEM — N84.0 POLYP OF CORPUS UTERI: Chronic | Status: ACTIVE | Noted: 2021-12-28

## 2022-02-03 PROBLEM — J18.9 PNEUMONIA, UNSPECIFIED ORGANISM: Chronic | Status: ACTIVE | Noted: 2021-12-28

## 2022-02-03 PROBLEM — K50.90 CROHN'S DISEASE, UNSPECIFIED, WITHOUT COMPLICATIONS: Chronic | Status: ACTIVE | Noted: 2017-11-10

## 2022-02-03 PROBLEM — F41.8 OTHER SPECIFIED ANXIETY DISORDERS: Chronic | Status: ACTIVE | Noted: 2021-12-28

## 2022-02-03 PROBLEM — G47.33 OBSTRUCTIVE SLEEP APNEA (ADULT) (PEDIATRIC): Chronic | Status: ACTIVE | Noted: 2021-12-28

## 2022-03-13 LAB — SARS-COV-2 N GENE NPH QL NAA+PROBE: NOT DETECTED

## 2022-03-16 ENCOUNTER — APPOINTMENT (OUTPATIENT)
Dept: PULMONOLOGY | Facility: CLINIC | Age: 47
End: 2022-03-16
Payer: COMMERCIAL

## 2022-03-16 VITALS
HEIGHT: 66 IN | BODY MASS INDEX: 31.66 KG/M2 | SYSTOLIC BLOOD PRESSURE: 107 MMHG | DIASTOLIC BLOOD PRESSURE: 72 MMHG | HEART RATE: 92 BPM | TEMPERATURE: 98.3 F | RESPIRATION RATE: 14 BRPM | WEIGHT: 197 LBS | OXYGEN SATURATION: 97 %

## 2022-03-16 PROCEDURE — 94727 GAS DIL/WSHOT DETER LNG VOL: CPT

## 2022-03-16 PROCEDURE — 99213 OFFICE O/P EST LOW 20 MIN: CPT | Mod: 25

## 2022-03-16 PROCEDURE — 94010 BREATHING CAPACITY TEST: CPT

## 2022-03-16 PROCEDURE — 94729 DIFFUSING CAPACITY: CPT

## 2022-03-16 PROCEDURE — ZZZZZ: CPT

## 2022-03-16 NOTE — DISCUSSION/SUMMARY
[FreeTextEntry1] : Asthma flare -  clinically improved- Z Raúl / medrol raúl- trial BREXTRI with sample  consider singulair if nec\par discuss re GERD prilosec + Pepcid\par Chronic persistent asthma\par Normal pulmonary physiology with normal NIOX and 6-minute walk test\par History of mucoid impaction\par Hx prior negative Aspergillus titers\par Sinus tachycardia\par Crohn's colitis without treatment with biologic agent\par  component of anxiety depressive disorder\par \par As per cardiology patient is having a beta-blocker added will need to monitor spirometric data and clinical symptoms- OFF\par Advair Rx- HOLD Trial BREZTRI 2 puffs BID\par Notify of any wheezing. Notify if rescue inhaler is needed greater than 2-3 times in any week. Avoid known triggers.\par Notify of any wheezing.  Notify if rescue inhaler is needed greater than 2-3 times in any week.  Avoid known triggers.\par \par  Pfizer vaccination completed P:us Sept booster\par \par

## 2022-03-16 NOTE — HISTORY OF PRESENT ILLNESS
[TextBox_4] : mild inc asthma sxs nocturnal with improvement with BREZTRI 2  puffs BID\par  told  severe reflux\par \par 45-year-old female\par Pulmonary evaluation\par Chief complaint chronic chest congestion occasional wheeze with a history of asthma\par Patient states is a longstanding asthma diagnosed onset childhood\par She states that 2018 she was admitted and treated for bilateral pneumonia [facility]\par In addition at that time being treated with antibiotics bronchodilators she underwent a bronchoscopy and had friable airways with copious mucoid secretions\par States since then she feels like a long-term asthma status has been worse\par She describes occasional but chronic wheeze as well as chest tightness chest congestion\par There is some associated shortness of breath\par She also admits there is some component of anxiety\par She is also been monitoring her heart rate with a Fitbit type watch as well as has tachycardia and is undergone cardiac evaluation and was prescribed a beta-blocker for which she has yet picked up\par Denies any sputum production or hemoptysis\par No fevers chills or sweats\par Her other medical history is significant for Crohn's colitis with no treatment for biologic agents\par No reported history of chemical toxic or inhalational exposures\par She states this past week or 2 she is used her rescue inhaler Ventolin well greater than 2-3 times\par She is on Symbicort 160 just 4.52 puffs twice daily\par \par Seasonal allergies reported\par   3 para 3 menopausal\par \par Non-smoker\par No alcohol use\par \par Hospital data review 2018\par CT angiogram\par Negative for pulmonary embolism\par  prior groundglass and tree-in-bud opacities resolved\par \par \par Additional data review 2017 lupus anticoagulant silica clotting time positive\par Prior d-dimer is negative\par 2017 ANCA negative atypical ANCA negative Aspergillus negative Marion antibody negative Mycoplasma IgG +2017 AFB bronchoscopy negative bronchoscopy special stains negative. \par Smoking Status: never \par eCigarettes: never \par Marijuana use: never \par

## 2022-03-16 NOTE — PHYSICAL EXAM
[No Acute Distress] : no acute distress [No Resp Distress] : no resp distress [Normal Oropharynx] : normal oropharynx [II] : Mallampati Class: II [Normal Appearance] : normal appearance [Supple] : supple [No Neck Mass] : no neck mass [No JVD] : no jvd [Normal Rate/Rhythm] : normal rate/rhythm [Normal S1, S2] : normal s1, s2 [No Edema] : no edema [No Murmurs] : no murmurs [No Rubs] : no rubs [No Gallops] : no gallops [No Acc Muscle Use] : no acc muscle use [Normal Palpation] : normal palpation [No Abnormalities] : no abnormalities [Benign] : benign [Not Tender] : not tender [Soft] : soft [No HSM] : no hsm [Normal Bowel Sounds] : normal bowel sounds [Normal Gait] : normal gait [Normal Color/ Pigmentation] : normal color/ pigmentation [No Focal Deficits] : no focal deficits [Oriented x3] : oriented x3 [Normal Affect] : normal affect

## 2022-03-16 NOTE — PROCEDURE
[FreeTextEntry1] : PFT 3/16/22\par Flow rates nl\par Lung Volume nl\par DLCO 98 %\par HGB13.0\par \par PFT Oct 13 2021\par Flow rates normal\par Lung volumes normal\par Diffusion normal \par HGB 13.1\par Noted mild decline at DLCO and FVC and FEV1\par \par NIOX 11 ppb\par  [place] No BD 6/30/21\par Normal flows\par \par PFT 4/14/21\par Normal flow rates\par Normal lung Volumes Normal DLCO 99 % pred\par  HGB 13.2\par NIOX 12 ppb 4/ 14 /21\par \par Chest x-ray PA lateral March 3, 2020\par Normal cardiac size\par Clear lungs\par No parenchymal infiltrates pleural effusions or dominant pulmonary nodules\par Soft tissue bony structures unremarkable\par Annalisa mediastinum unremarkable\par No gross interval change compared to chest x-ray orbits 14 2020\par Impression clear lungs\par \par NIOX 14  ppb 9/11/20\par PFT 9/11/2020\par spirometry  normal flow  rates\par lung volumes normal\par normal DLCO\par  HGB 13.1\par \par PFT August 14, 2020\par Normal flow rates\par No response to bronchodilator at the FEV1\par Borderline 17% BD  to bronchodilator at the small airways\par Normal lung volumes\par Diffusion normal 104% of predicted.\par \par NIOX 13 ppb\par Pulmonary 6-minute walk stress test August 14, 2020\par Baseline room air O2 saturation 97% with a heart rate baseline 97 there immediately increased to 115\par Maximum heart rate was 130 at 5 minutes with room air O2 saturation 98%\par Impression sinus tachycardia\par Negative exertional hypoxemia or desaturation\par No indication for portable oxygen therapy\par \par Chest x-ray PA lateral August 14, 2020\par Normal cardiac size\par Lung fields are clear without parenchymal infiltrates pleural effusions or dominant pulmonary nodules\par Soft tissue bony structures grossly unremarkable\par Annalisa mediastinum grossly unremarkable\par Impression clear lungs\par \par \par

## 2022-08-03 ENCOUNTER — APPOINTMENT (OUTPATIENT)
Dept: PULMONOLOGY | Facility: CLINIC | Age: 47
End: 2022-08-03

## 2022-08-03 PROCEDURE — 99214 OFFICE O/P EST MOD 30 MIN: CPT | Mod: 95

## 2022-08-03 RX ORDER — ALPRAZOLAM 0.5 MG/1
0.5 TABLET ORAL
Qty: 30 | Refills: 0 | Status: ACTIVE | COMMUNITY
Start: 2022-06-23

## 2022-08-03 RX ORDER — METRONIDAZOLE 7.5 MG/G
0.75 GEL VAGINAL
Qty: 70 | Refills: 0 | Status: DISCONTINUED | COMMUNITY
Start: 2022-05-05

## 2022-08-03 RX ORDER — TIZANIDINE 4 MG/1
4 TABLET ORAL
Qty: 30 | Refills: 0 | Status: DISCONTINUED | COMMUNITY
Start: 2022-03-30

## 2022-08-03 NOTE — HISTORY OF PRESENT ILLNESS
[Home] : at home, [unfilled] , at the time of the visit. [Medical Office: ([facility])___] : at the medical office located in  [Verbal consent obtained from patient] : the patient, [unfilled] [Never] : never [TextBox_4] : History asthma\par History of Crohn's disease\par \par Depression\par Patient developed a headache with 7 nasal congestion may be a little fatigue\par Rapid COVID antigen positive\par Positive COVID-vaccine up-to-date\par No fevers chills or sweats\par O2 saturations greater than 95%\par Skin rashes chest pain chest tightness shortness of breath\par

## 2022-08-03 NOTE — PHYSICAL EXAM
no fever/no chills/no weight loss [No Acute Distress] : no acute distress [Normal Oropharynx] : normal oropharynx [Normal Appearance] : normal appearance [No Neck Mass] : no neck mass [Normal Rate/Rhythm] : normal rate/rhythm [Normal S1, S2] : normal s1, s2 [No Murmurs] : no murmurs [No Resp Distress] : no resp distress [Benign] : benign [Clear to Auscultation Bilaterally] : clear to auscultation bilaterally [No Abnormalities] : no abnormalities [No Clubbing] : no clubbing [Normal Gait] : normal gait [No Edema] : no edema [FROM] : FROM [No Cyanosis] : no cyanosis [No Focal Deficits] : no focal deficits [Normal Color/ Pigmentation] : normal color/ pigmentation [Normal Affect] : normal affect [Oriented x3] : oriented x3

## 2022-08-03 NOTE — REVIEW OF SYSTEMS
[Fatigue] : fatigue [Cough] : cough [Chest Discomfort] : chest discomfort [Headache] : headache [Negative] : Hematologic [Fever] : no fever [Chills] : no chills [Nasal Congestion] : no nasal congestion [Palpitations] : no palpitations [Myalgias] : no myalgias [Diabetes] : no diabetes [Thyroid Problem] : no thyroid problem

## 2022-08-03 NOTE — PROCEDURE
[FreeTextEntry1] : PFT 3/16/22\par Flow rates nl\par Lung Volume nl\par DLCO 98 %\par HGB13.0\par \par PFT Oct 13 2021\par Flow rates normal\par Lung volumes normal\par Diffusion normal \par HGB 13.1\par Noted mild decline at DLCO and FVC and FEV1\par \par NIOX 11 ppb\par  [place] No BD 6/30/21\par Normal flows\par \par PFT 4/14/21\par Normal flow rates\par Normal lung Volumes Normal DLCO 99 % pred\par  HGB 13.2\par NIOX 12 ppb 4/ 14 /21\par \par Chest x-ray PA lateral March 3, 2020\par Normal cardiac size\par Clear lungs\par No parenchymal infiltrates pleural effusions or dominant pulmonary nodules\par Soft tissue bony structures unremarkable\par Annalisa mediastinum unremarkable\par No gross interval change compared to chest x-ray orbits 14 2020\par Impression clear lungs\par \par NIOX 14  ppb 9/11/20\par PFT 9/11/2020\par spirometry  normal flow  rates\par lung volumes normal\par normal DLCO\par  HGB 13.1\par \par PFT August 14, 2020\par Normal flow rates\par No response to bronchodilator at the FEV1\par Borderline 17% BD  to bronchodilator at the small airways\par Normal lung volumes\par Diffusion normal 104% of predicted.\par \par NIOX 13 ppb\par Pulmonary 6-minute walk stress test August 14, 2020\par Baseline room air O2 saturation 97% with a heart rate baseline 97 there immediately increased to 115\par Maximum heart rate was 130 at 5 minutes with room air O2 saturation 98%\par Impression sinus tachycardia\par Negative exertional hypoxemia or desaturation\par No indication for portable oxygen therapy\par \par Chest x-ray PA lateral August 14, 2020\par Normal cardiac size\par Lung fields are clear without parenchymal infiltrates pleural effusions or dominant pulmonary nodules\par Soft tissue bony structures grossly unremarkable\par Annalisa mediastinum grossly unremarkable\par Impression clear lungs\par \par \par

## 2022-08-03 NOTE — DISCUSSION/SUMMARY
[FreeTextEntry1] : COVID 19 infection\par Medications\par COVID tract\par OralPaxlovid\par Reviewed all medications will hold statin\par Maintain O2 saturations greater than 93% otherwise notify immediately\par Any progression of symptoms notify office immediately\par Reschedule office follow-up\par \par Asthma \par discuss re GERD prilosec + Pepcid\par Chronic persistent asthma\par Normal pulmonary physiology with normal NIOX and 6-minute walk test\par History of mucoid impaction\par Hx prior negative Aspergillus titers\par Sinus tachycardia\par Crohn's colitis without treatment with biologic agent\par  component of anxiety depressive disorder\par \par As per cardiology patient is having a beta-blocker added will need to monitor spirometric data and clinical symptoms- OFF\par Advair Rx- HOLD Trial BREZTRI 2 puffs BID\par Notify of any wheezing. Notify if rescue inhaler is needed greater than 2-3 times in any week. Avoid known triggers.\par Notify of any wheezing.  Notify if rescue inhaler is needed greater than 2-3 times in any week.  Avoid known triggers.\par \par  Pfizer vaccination completed P:us Sept booster\par \par

## 2022-08-05 ENCOUNTER — NON-APPOINTMENT (OUTPATIENT)
Age: 47
End: 2022-08-05

## 2022-08-05 LAB
RAPID RVP RESULT: NOT DETECTED
SARS-COV-2 RNA PNL RESP NAA+PROBE: NOT DETECTED

## 2022-08-08 ENCOUNTER — NON-APPOINTMENT (OUTPATIENT)
Age: 47
End: 2022-08-08

## 2022-08-10 ENCOUNTER — NON-APPOINTMENT (OUTPATIENT)
Age: 47
End: 2022-08-10

## 2022-08-31 ENCOUNTER — APPOINTMENT (OUTPATIENT)
Dept: PULMONOLOGY | Facility: CLINIC | Age: 47
End: 2022-08-31

## 2022-09-08 ENCOUNTER — APPOINTMENT (OUTPATIENT)
Dept: PULMONOLOGY | Facility: CLINIC | Age: 47
End: 2022-09-08

## 2022-09-08 VITALS
OXYGEN SATURATION: 97 % | RESPIRATION RATE: 16 BRPM | HEIGHT: 66 IN | HEART RATE: 97 BPM | DIASTOLIC BLOOD PRESSURE: 80 MMHG | TEMPERATURE: 97.2 F | BODY MASS INDEX: 31.66 KG/M2 | SYSTOLIC BLOOD PRESSURE: 116 MMHG | WEIGHT: 197 LBS

## 2022-09-08 PROCEDURE — 94010 BREATHING CAPACITY TEST: CPT

## 2022-09-08 PROCEDURE — 71046 X-RAY EXAM CHEST 2 VIEWS: CPT

## 2022-09-08 PROCEDURE — 94729 DIFFUSING CAPACITY: CPT

## 2022-09-08 PROCEDURE — ZZZZZ: CPT

## 2022-09-08 PROCEDURE — 95012 NITRIC OXIDE EXP GAS DETER: CPT

## 2022-09-08 PROCEDURE — 99214 OFFICE O/P EST MOD 30 MIN: CPT | Mod: 25

## 2022-09-08 PROCEDURE — 94727 GAS DIL/WSHOT DETER LNG VOL: CPT

## 2022-09-08 RX ORDER — FAMOTIDINE 40 MG/1
40 TABLET, FILM COATED ORAL
Qty: 30 | Refills: 0 | Status: DISCONTINUED | COMMUNITY
Start: 2022-08-03 | End: 2022-09-08

## 2022-09-08 NOTE — HISTORY OF PRESENT ILLNESS
[TextBox_4] : COVID PCR neg for suspected COVID\par feels  inability to take deep  breath but no pain\par feels hard to mexpand\par \par mild inc asthma sxs nocturnal with improvement with BREZTRI 2  puffs BID\par  told  severe reflux\par \par 45-year-old female\par Pulmonary evaluation\par Chief complaint chronic chest congestion occasional wheeze with a history of asthma\par Patient states is a longstanding asthma diagnosed onset childhood\par She states that 2018 she was admitted and treated for bilateral pneumonia [facility]\par In addition at that time being treated with antibiotics bronchodilators she underwent a bronchoscopy and had friable airways with copious mucoid secretions\par States since then she feels like a long-term asthma status has been worse\par She describes occasional but chronic wheeze as well as chest tightness chest congestion\par There is some associated shortness of breath\par She also admits there is some component of anxiety\par She is also been monitoring her heart rate with a Fitbit type watch as well as has tachycardia and is undergone cardiac evaluation and was prescribed a beta-blocker for which she has yet picked up\par Denies any sputum production or hemoptysis\par No fevers chills or sweats\par Her other medical history is significant for Crohn's colitis with no treatment for biologic agents\par No reported history of chemical toxic or inhalational exposures\par She states this past week or 2 she is used her rescue inhaler Ventolin well greater than 2-3 times\par She is on Symbicort 160 just 4.52 puffs twice daily\par \par Seasonal allergies reported\par   3 para 3 menopausal\par \par Non-smoker\par No alcohol use\par \par Hospital data review 2018\par CT angiogram\par Negative for pulmonary embolism\par  prior groundglass and tree-in-bud opacities resolved\par \par \par Additional data review 2017 lupus anticoagulant silica clotting time positive\par Prior d-dimer is negative\par 2017 ANCA negative atypical ANCA negative Aspergillus negative Marion antibody negative Mycoplasma IgG +2017 AFB bronchoscopy negative bronchoscopy special stains negative. \par Smoking Status: never \par eCigarettes: never \par Marijuana use: never \par

## 2022-09-08 NOTE — PROCEDURE
[FreeTextEntry1] : PFT September 8, 2022\par Flow rates normal 115 normal\par Diffusion normal 93% predicted\par Data comparison to March 2022 demonstrates overall stable pulmonary physiology\par NIOX  13  ppb WNL 9/8/22\par \par Chest x-ray PA lateral\par September 2022\par Respiratory disorder\par Cardiac size is normal\par Significant atelectasis left lower lung zone\par Infiltrates worse\par \par PFT 3/16/22\par Flow rates nl\par Lung Volume nl\par DLCO 98 %\par HGB13.0\par \par PFT Oct 13 2021\par Flow rates normal\par Lung volumes normal\par Diffusion normal \par HGB 13.1\par Noted mild decline at DLCO and FVC and FEV1\par \par NIOX 11 ppb\par  [place] No BD 6/30/21\par Normal flows\par \par PFT 4/14/21\par Normal flow rates\par Normal lung Volumes Normal DLCO 99 % pred\par  HGB 13.2\par NIOX 12 ppb 4/ 14 /21\par \par Chest x-ray PA lateral March 3, 2020\par Normal cardiac size\par Clear lungs\par No parenchymal infiltrates pleural effusions or dominant pulmonary nodules\par Soft tissue bony structures unremarkable\par Annalisa mediastinum unremarkable\par No gross interval change compared to chest x-ray orbits 14 2020\par Impression clear lungs\par \par NIOX 14  ppb 9/11/20\par PFT 9/11/2020\par spirometry  normal flow  rates\par lung volumes normal\par normal DLCO\par  HGB 13.1\par \par PFT August 14, 2020\par Normal flow rates\par No response to bronchodilator at the FEV1\par Borderline 17% BD  to bronchodilator at the small airways\par Normal lung volumes\par Diffusion normal 104% of predicted.\par \par NIOX 13 ppb\par Pulmonary 6-minute walk stress test August 14, 2020\par Baseline room air O2 saturation 97% with a heart rate baseline 97 there immediately increased to 115\par Maximum heart rate was 130 at 5 minutes with room air O2 saturation 98%\par Impression sinus tachycardia\par Negative exertional hypoxemia or desaturation\par No indication for portable oxygen therapy\par \par Chest x-ray PA lateral August 14, 2020\par Normal cardiac size\par Lung fields are clear without parenchymal infiltrates pleural effusions or dominant pulmonary nodules\par Soft tissue bony structures grossly unremarkable\par Annalisa mediastinum grossly unremarkable\par Impression clear lungs\par \par \par

## 2022-09-08 NOTE — PHYSICAL EXAM
[No Acute Distress] : no acute distress [Normal Oropharynx] : normal oropharynx [II] : Mallampati Class: II [Normal Appearance] : normal appearance [Supple] : supple [No Neck Mass] : no neck mass [No JVD] : no jvd [Normal Rate/Rhythm] : normal rate/rhythm [Normal S1, S2] : normal s1, s2 [No Edema] : no edema [No Murmurs] : no murmurs [No Rubs] : no rubs [No Gallops] : no gallops [No Resp Distress] : no resp distress [No Acc Muscle Use] : no acc muscle use [Normal Palpation] : normal palpation [No Abnormalities] : no abnormalities [Benign] : benign [Not Tender] : not tender [Soft] : soft [No HSM] : no hsm [Normal Bowel Sounds] : normal bowel sounds [Normal Gait] : normal gait [Normal Color/ Pigmentation] : normal color/ pigmentation [No Focal Deficits] : no focal deficits [Oriented x3] : oriented x3 [Normal Affect] : normal affect

## 2022-09-08 NOTE — DISCUSSION/SUMMARY
[FreeTextEntry1] : Stress likely Including sensation of inability to recover good respiratory breath-no medication change indicated\par Asthma flare -  clinically improved- Z Raúl / medrol raúl- trial BREXTRI with sample  consider singulair if nec\par discuss re GERD prilosec + Pepcid\par Chronic persistent asthma\par Normal pulmonary physiology with normal NIOX and 6-minute walk test\par History of mucoid impaction\par Hx prior negative Aspergillus titers\par Sinus tachycardia\par Crohn's colitis without treatment with biologic agent\par  component of anxiety depressive disorder\par \par As per cardiology patient is having a beta-blocker added will need to monitor spirometric data and clinical symptoms- OFF\par Advair Rx- HOLD Trial BREZTRI 2 puffs BID\par Notify of any wheezing. Notify if rescue inhaler is needed greater than 2-3 times in any week. Avoid known triggers.\par Notify of any wheezing.  Notify if rescue inhaler is needed greater than 2-3 times in any week.  Avoid known triggers.\par \par  Pfizer vaccination completed P:us Sept booster\par \par

## 2023-01-25 ENCOUNTER — APPOINTMENT (OUTPATIENT)
Dept: PULMONOLOGY | Facility: CLINIC | Age: 48
End: 2023-01-25
Payer: COMMERCIAL

## 2023-01-25 VITALS — SYSTOLIC BLOOD PRESSURE: 115 MMHG | OXYGEN SATURATION: 98 % | DIASTOLIC BLOOD PRESSURE: 76 MMHG | HEART RATE: 89 BPM

## 2023-01-25 PROCEDURE — 94010 BREATHING CAPACITY TEST: CPT

## 2023-01-25 PROCEDURE — 99213 OFFICE O/P EST LOW 20 MIN: CPT | Mod: 25

## 2023-01-25 RX ORDER — ALBUTEROL SULFATE 90 UG/1
108 (90 BASE) INHALANT RESPIRATORY (INHALATION)
Qty: 1 | Refills: 3 | Status: ACTIVE | COMMUNITY
Start: 2020-08-14 | End: 1900-01-01

## 2023-01-25 NOTE — DISCUSSION/SUMMARY
[FreeTextEntry1] : Present stable asthma no active symptoms\par Asthma flare -  clinically improved- Z Raúl / medrol raúl- trial BREXTRI with sample  consider singulair if nec NOT Active\par discuss re GERD prilosec + Pepcid\par Chronic persistent asthma\par Normal pulmonary physiology with normal NIOX and 6-minute walk test\par History of mucoid impaction\par Hx prior negative Aspergillus titers\par Sinus tachycardia\par Crohn's colitis without treatment with biologic agent\par  component of anxiety depressive disorder\par \par As per cardiology patient is having a beta-blocker added will need to monitor spirometric data and clinical symptoms- OFF\par Advair Rx- HOLD Trial BREZTRI 2 puffs BID\par Notify of any wheezing. Notify if rescue inhaler is needed greater than 2-3 times in any week. Avoid known triggers.\par Notify of any wheezing.  Notify if rescue inhaler is needed greater than 2-3 times in any week.  Avoid known triggers.\par \par  Pfizer vaccination completed P:us Sept booster\par \par

## 2023-01-25 NOTE — HISTORY OF PRESENT ILLNESS
[TextBox_4] : COVID PCR neg for suspected COVID\par feels  inability to take deep  breath but no pain\par feels hard to mexpand\par \par mild inc asthma sxs nocturnal with improvement with BREZTRI 2  puffs BID\par  told  severe reflux\par \par 45-year-old female\par Pulmonary evaluation\par Chief complaint chronic chest congestion occasional wheeze with a history of asthma\par Patient states is a longstanding asthma diagnosed onset childhood\par She states that 2018 she was admitted and treated for bilateral pneumonia [facility]\par In addition at that time being treated with antibiotics bronchodilators she underwent a bronchoscopy and had friable airways with copious mucoid secretions\par States since then she feels like a long-term asthma status has been worse\par She describes occasional but chronic wheeze as well as chest tightness chest congestion\par There is some associated shortness of breath\par She also admits there is some component of anxiety\par She is also been monitoring her heart rate with a Fitbit type watch as well as has tachycardia and is undergone cardiac evaluation and was prescribed a beta-blocker for which she has yet picked up\par Denies any sputum production or hemoptysis\par No fevers chills or sweats\par Her other medical history is significant for Crohn's colitis with no treatment for biologic agents\par No reported history of chemical toxic or inhalational exposures\par She states this past week or 2 she is used her rescue inhaler Ventolin well greater than 2-3 times\par She is on Symbicort 160 just 4.52 puffs twice daily\par \par Seasonal allergies reported\par   3 para 3 menopausal\par \par Non-smoker\par No alcohol use\par \par Hospital data review 2018\par CT angiogram\par Negative for pulmonary embolism\par  prior groundglass and tree-in-bud opacities resolved\par \par \par Additional data review 2017 lupus anticoagulant silica clotting time positive\par Prior d-dimer is negative\par 2017 ANCA negative atypical ANCA negative Aspergillus negative Marion antibody negative Mycoplasma IgG +2017 AFB bronchoscopy negative bronchoscopy special stains negative. \par Smoking Status: never \par eCigarettes: never \par Marijuana use: never \par

## 2023-01-25 NOTE — PROCEDURE
[FreeTextEntry1] : Rhonchi no bronchodilator\par Normal for age\par Ratio 79\par Although data comparison to study September 8 with PFT demonstrates some decline in flow rates but patient asymptomatic\par  NIOX deferred secondary to insurance coverage\par \par PFT September 8, 2022\par Flow rates normal 115 normal\par Diffusion normal 93% predicted\par Data comparison to March 2022 demonstrates overall stable pulmonary physiology\par NIOX  13  ppb WNL 9/8/22\par \par Chest x-ray PA lateral\par September 2022\par Respiratory disorder\par Cardiac size is normal\par Significant atelectasis left lower lung zone\par Infiltrates worse\par \par PFT 3/16/22\par Flow rates nl\par Lung Volume nl\par DLCO 98 %\par HGB13.0\par \par PFT Oct 13 2021\par Flow rates normal\par Lung volumes normal\par Diffusion normal \par HGB 13.1\par Noted mild decline at DLCO and FVC and FEV1\par \par NIOX 11 ppb\par  [place] No BD 6/30/21\par Normal flows\par \par PFT 4/14/21\par Normal flow rates\par Normal lung Volumes Normal DLCO 99 % pred\par  HGB 13.2\par NIOX 12 ppb 4/ 14 /21\par \par Chest x-ray PA lateral March 3, 2020\par Normal cardiac size\par Clear lungs\par No parenchymal infiltrates pleural effusions or dominant pulmonary nodules\par Soft tissue bony structures unremarkable\par Annalisa mediastinum unremarkable\par No gross interval change compared to chest x-ray orbits 14 2020\par Impression clear lungs\par \par NIOX 14  ppb 9/11/20\par PFT 9/11/2020\par spirometry  normal flow  rates\par lung volumes normal\par normal DLCO\par  HGB 13.1\par \par PFT August 14, 2020\par Normal flow rates\par No response to bronchodilator at the FEV1\par Borderline 17% BD  to bronchodilator at the small airways\par Normal lung volumes\par Diffusion normal 104% of predicted.\par \par NIOX 13 ppb\par Pulmonary 6-minute walk stress test August 14, 2020\par Baseline room air O2 saturation 97% with a heart rate baseline 97 there immediately increased to 115\par Maximum heart rate was 130 at 5 minutes with room air O2 saturation 98%\par Impression sinus tachycardia\par Negative exertional hypoxemia or desaturation\par No indication for portable oxygen therapy\par \par Chest x-ray PA lateral August 14, 2020\par Normal cardiac size\par Lung fields are clear without parenchymal infiltrates pleural effusions or dominant pulmonary nodules\par Soft tissue bony structures grossly unremarkable\par Annalisa mediastinum grossly unremarkable\par Impression clear lungs\par \par \par

## 2023-03-03 NOTE — PROGRESS NOTE ADULT - ASSESSMENT
Patient is progressing and involved with plan of care, communicating needs throughout shift. Up with stand by assist. Poor appetite, voiding without difficulty. Pain well controlled with PCA. Abx continued as ordered. CBG checked at HS. SSI given as needed. All vitals stable; no acute events this shift. Pt. Remaining free from falls or injury throughout shift; bed in lowest position; side rails up X2; call light within reach; pt instructed to call for assistance as needed - verbalized understanding. Q2h rounding on patient. Will continue to monitor.         42F with PMH anxiety, Crohn's disease on 6-MP, asthma presents with cough/SOB since October 19.  Chronic cough. Chest pain/chest tightness/back pain, atypical. Not likely cardiac.  Hx of normal CCTA last year, with Ca score 0. Echo ordered, and can be done after bronchoscopy.    RADIOLOGY/EKG: NSR, no ST-T changes    No cardiac contraindication to bronchoscopy

## 2023-04-26 ENCOUNTER — APPOINTMENT (OUTPATIENT)
Dept: PULMONOLOGY | Facility: CLINIC | Age: 48
End: 2023-04-26

## 2023-05-03 ENCOUNTER — APPOINTMENT (OUTPATIENT)
Dept: PULMONOLOGY | Facility: CLINIC | Age: 48
End: 2023-05-03
Payer: COMMERCIAL

## 2023-05-03 ENCOUNTER — LABORATORY RESULT (OUTPATIENT)
Age: 48
End: 2023-05-03

## 2023-05-03 VITALS — SYSTOLIC BLOOD PRESSURE: 120 MMHG | HEART RATE: 104 BPM | DIASTOLIC BLOOD PRESSURE: 78 MMHG | OXYGEN SATURATION: 99 %

## 2023-05-03 DIAGNOSIS — R29.818 OTHER SYMPTOMS AND SIGNS INVOLVING THE NERVOUS SYSTEM: ICD-10-CM

## 2023-05-03 DIAGNOSIS — R06.00 DYSPNEA, UNSPECIFIED: ICD-10-CM

## 2023-05-03 LAB — POCT - HEMOGLOBIN (HGB), QUANTITATIVE, TRANSCUTANEOUS: 12.9

## 2023-05-03 PROCEDURE — 71046 X-RAY EXAM CHEST 2 VIEWS: CPT

## 2023-05-03 PROCEDURE — 88738 HGB QUANT TRANSCUTANEOUS: CPT

## 2023-05-03 PROCEDURE — 36415 COLL VENOUS BLD VENIPUNCTURE: CPT

## 2023-05-03 PROCEDURE — 99214 OFFICE O/P EST MOD 30 MIN: CPT | Mod: 25

## 2023-05-03 PROCEDURE — 94727 GAS DIL/WSHOT DETER LNG VOL: CPT

## 2023-05-03 PROCEDURE — 94010 BREATHING CAPACITY TEST: CPT

## 2023-05-03 PROCEDURE — ZZZZZ: CPT

## 2023-05-03 PROCEDURE — 94729 DIFFUSING CAPACITY: CPT

## 2023-05-03 NOTE — PROCEDURE
[FreeTextEntry1] : PFT 5/3/23\par  Flow  rates nl\par  Lung Volumes nl\par  DLCO 107 % pred WNL\par  no decline \par HGB 12.9\par sawtooth pattern r/o TAMMIE\par \par Chest x-ray PA lateral May 3, 2023\par Indication tachycardia chest congestion chest tightness\par Cardiac size is normal\par Lung fields are clear\par No parenchymal infiltrates pleural effusions dominant pulmonary nodules\par Soft tissue bony structures unremarkable\par Mediastinum unremarkable\par Impression clear lungs\par \par PFT September 8, 2022\par Flow rates normal 115 normal\par Diffusion normal 93% predicted\par Data comparison to March 2022 demonstrates overall stable pulmonary physiology\par NIOX  13  ppb WNL 9/8/22\par \par \par PFT 3/16/22\par Flow rates nl\par Lung Volume nl\par DLCO 98 %\par HGB13.0\par \par PFT Oct 13 2021\par Flow rates normal\par Lung volumes normal\par Diffusion normal \par HGB 13.1\par Noted mild decline at DLCO and FVC and FEV1\par \par NIOX 11 ppb\par  [place] No BD 6/30/21\par Normal flows\par \par PFT 4/14/21\par Normal flow rates\par Normal lung Volumes Normal DLCO 99 % pred\par  HGB 13.2\par NIOX 12 ppb 4/ 14 /21\par \par Chest x-ray PA lateral March 3, 2020\par Normal cardiac size\par Clear lungs\par No parenchymal infiltrates pleural effusions or dominant pulmonary nodules\par Soft tissue bony structures unremarkable\par Annalisa mediastinum unremarkable\par No gross interval change compared to chest x-ray orbits 14 2020\par Impression clear lungs\par \par NIOX 14  ppb 9/11/20\par PFT 9/11/2020\par spirometry  normal flow  rates\par lung volumes normal\par normal DLCO\par  HGB 13.1\par \par PFT August 14, 2020\par Normal flow rates\par No response to bronchodilator at the FEV1\par Borderline 17% BD  to bronchodilator at the small airways\par Normal lung volumes\par Diffusion normal 104% of predicted.\par \par NIOX 13 ppb\par Pulmonary 6-minute walk stress test August 14, 2020\par Baseline room air O2 saturation 97% with a heart rate baseline 97 there immediately increased to 115\par Maximum heart rate was 130 at 5 minutes with room air O2 saturation 98%\par Impression sinus tachycardia\par Negative exertional hypoxemia or desaturation\par No indication for portable oxygen therapy\par \par Chest x-ray PA lateral August 14, 2020\par Normal cardiac size\par Lung fields are clear without parenchymal infiltrates pleural effusions or dominant pulmonary nodules\par Soft tissue bony structures grossly unremarkable\par Annalisa mediastinum grossly unremarkable\par Impression clear lungs\par \par Blood draw\par \par

## 2023-05-03 NOTE — HISTORY OF PRESENT ILLNESS
[Never] : never [Daytime Somnolence] : daytime somnolence [Snoring] : snoring [Unintentional Sleep while Inactive] : unintentional sleep while inactive [TextBox_4] : nooted inc HR / SOB  self txed xanax\par mild inc asthma sxs nocturnal with improvement with BREZTRI 2  puffs BID\par added inhaler spacer\par chest tightness/ pressure\par feels inability to take deep  breath\par states cardiology about 2 years ago re inc HR ? prn use BB\par \par  told  severe reflux\par \par 45-year-old female\par Pulmonary evaluation\par Chief complaint chronic chest congestion occasional wheeze with a history of asthma\par Patient states is a longstanding asthma diagnosed onset childhood\par She states that 2018 she was admitted and treated for bilateral pneumonia [facility]\par In addition at that time being treated with antibiotics bronchodilators she underwent a bronchoscopy and had friable airways with copious mucoid secretions\par States since then she feels like a long-term asthma status has been worse\par She describes occasional but chronic wheeze as well as chest tightness chest congestion\par There is some associated shortness of breath\par She also admits there is some component of anxiety\par She is also been monitoring her heart rate with a Fitbit type watch as well as has tachycardia and is undergone cardiac evaluation and was prescribed a beta-blocker for which she has yet picked up\par Denies any sputum production or hemoptysis\par No fevers chills or sweats\par Her other medical history is significant for Crohn's colitis with no treatment for biologic agents\par No reported history of chemical toxic or inhalational exposures\par She states this past week or 2 she is used her rescue inhaler Ventolin well greater than 2-3 times\par She is on Symbicort 160 just 4.52 puffs twice daily\par \par Seasonal allergies reported\par   3 para 3 menopausal\par \par Non-smoker\par No alcohol use\par \par Hospital data review 2018\par CT angiogram\par Negative for pulmonary embolism\par  prior groundglass and tree-in-bud opacities resolved\par \par \par Additional data review 2017 lupus anticoagulant silica clotting time positive\par Prior d-dimer is negative\par 2017 ANCA negative atypical ANCA negative Aspergillus negative Marion antibody negative Mycoplasma IgG +2017 AFB bronchoscopy negative bronchoscopy special stains negative. \par Smoking Status: never \par eCigarettes: never \par Marijuana use: never \par

## 2023-05-03 NOTE — DISCUSSION/SUMMARY
[FreeTextEntry1] : Present stable asthma no active symptoms\par Asthma flare -  clinically improved- Z Raúl / medrol raúl- trial BREXTRI with sample  consider singulair if nec NOT Active\par discuss re GERD prilosec + Pepcid\par Chronic persistent asthma\par Normal pulmonary physiology with normal NIOX and 6-minute walk test\par History of mucoid impaction\par Hx prior negative Aspergillus titers\par Sinus tachycardia\par Crohn's colitis without treatment with biologic agent\par  component of anxiety depressive disorder\par \par As per cardiology patient is having a beta-blocker added will need to monitor spirometric data and clinical symptoms- OFF\par Advair Rx- HOLD Trial BREZTRI 2 puffs BID\par Notify of any wheezing. Notify if rescue inhaler is needed greater than 2-3 times in any week. Avoid known triggers.\par Notify of any wheezing.  Notify if rescue inhaler is needed greater than 2-3 times in any week.  Avoid known triggers.\par \par  Pfizer vaccination completed P:us Sept booster\par check TFT sec inc HR\par  r/o TAMMIE  HSS\par Blood work pending\par Asthma panel\par Thyroid panel\par DVT panel\par Office follow-up 1 month following the study

## 2023-05-04 ENCOUNTER — NON-APPOINTMENT (OUTPATIENT)
Age: 48
End: 2023-05-04

## 2023-05-04 LAB
ANION GAP SERPL CALC-SCNC: 15 MMOL/L
BASOPHILS # BLD AUTO: 0.04 K/UL
BASOPHILS NFR BLD AUTO: 0.5 %
BUN SERPL-MCNC: 14 MG/DL
CALCIUM SERPL-MCNC: 9.5 MG/DL
CHLORIDE SERPL-SCNC: 106 MMOL/L
CO2 SERPL-SCNC: 21 MMOL/L
CREAT SERPL-MCNC: 1.05 MG/DL
DEPRECATED D DIMER PPP IA-ACNC: 170 NG/ML DDU
EGFR: 67 ML/MIN/1.73M2
EOSINOPHIL # BLD AUTO: 0.05 K/UL
EOSINOPHIL NFR BLD AUTO: 0.7 %
GLUCOSE SERPL-MCNC: 85 MG/DL
HCT VFR BLD CALC: 39.7 %
HGB BLD-MCNC: 12.9 G/DL
IMM GRANULOCYTES NFR BLD AUTO: 0.3 %
LYMPHOCYTES # BLD AUTO: 1.79 K/UL
LYMPHOCYTES NFR BLD AUTO: 23.6 %
MAN DIFF?: NORMAL
MCHC RBC-ENTMCNC: 29.1 PG
MCHC RBC-ENTMCNC: 32.5 GM/DL
MCV RBC AUTO: 89.4 FL
MONOCYTES # BLD AUTO: 0.55 K/UL
MONOCYTES NFR BLD AUTO: 7.2 %
NEUTROPHILS # BLD AUTO: 5.14 K/UL
NEUTROPHILS NFR BLD AUTO: 67.7 %
PLATELET # BLD AUTO: 330 K/UL
POTASSIUM SERPL-SCNC: 4.3 MMOL/L
RBC # BLD: 4.44 M/UL
RBC # FLD: 14.7 %
SODIUM SERPL-SCNC: 142 MMOL/L
T3 SERPL-MCNC: 121 NG/DL
T3RU NFR SERPL: 1 TBI
T4 FREE SERPL-MCNC: 1 NG/DL
T4 SERPL-MCNC: 6.9 UG/DL
THYROGLOB AB SERPL-ACNC: <20 IU/ML
THYROPEROXIDASE AB SERPL IA-ACNC: <10 IU/ML
TSH SERPL-ACNC: 1 UIU/ML
WBC # FLD AUTO: 7.59 K/UL

## 2023-05-05 LAB
A ALTERNATA IGE QN: <0.1 KUA/L
A FUMIGATUS IGE QN: <0.1 KUA/L
C ALBICANS IGE QN: <0.1 KUA/L
C HERBARUM IGE QN: <0.1 KUA/L
CAT DANDER IGE QN: 0.15 KUA/L
CLAM IGE QN: <0.1 KUA/L
CODFISH IGE QN: <0.1 KUA/L
COMMON RAGWEED IGE QN: <0.1 KUA/L
CORN IGE QN: <0.1 KUA/L
COW MILK IGE QN: <0.1 KUA/L
D FARINAE IGE QN: 1.2 KUA/L
D PTERONYSS IGE QN: 0.6 KUA/L
DEPRECATED A ALTERNATA IGE RAST QL: 0
DEPRECATED A FUMIGATUS IGE RAST QL: 0
DEPRECATED C ALBICANS IGE RAST QL: 0
DEPRECATED C HERBARUM IGE RAST QL: 0
DEPRECATED CAT DANDER IGE RAST QL: NORMAL
DEPRECATED CLAM IGE RAST QL: 0
DEPRECATED CODFISH IGE RAST QL: 0
DEPRECATED COMMON RAGWEED IGE RAST QL: 0
DEPRECATED CORN IGE RAST QL: 0
DEPRECATED COW MILK IGE RAST QL: 0
DEPRECATED D FARINAE IGE RAST QL: 2
DEPRECATED D PTERONYSS IGE RAST QL: 1
DEPRECATED DOG DANDER IGE RAST QL: 0
DEPRECATED EGG WHITE IGE RAST QL: 0
DEPRECATED M RACEMOSUS IGE RAST QL: 0
DEPRECATED PEANUT IGE RAST QL: 0
DEPRECATED ROACH IGE RAST QL: 0
DEPRECATED SCALLOP IGE RAST QL: <0.1 KUA/L
DEPRECATED SESAME SEED IGE RAST QL: 0
DEPRECATED SHRIMP IGE RAST QL: 0
DEPRECATED SOYBEAN IGE RAST QL: 0
DEPRECATED TIMOTHY IGE RAST QL: 0
DEPRECATED WALNUT IGE RAST QL: 0
DEPRECATED WHEAT IGE RAST QL: 0
DEPRECATED WHITE OAK IGE RAST QL: 0
DOG DANDER IGE QN: <0.1 KUA/L
EGG WHITE IGE QN: <0.1 KUA/L
M RACEMOSUS IGE QN: <0.1 KUA/L
PEANUT IGE QN: <0.1 KUA/L
ROACH IGE QN: <0.1 KUA/L
SCALLOP IGE QN: 0
SCALLOP IGE QN: <0.1 KUA/L
SESAME SEED IGE QN: <0.1 KUA/L
SOYBEAN IGE QN: <0.1 KUA/L
TIMOTHY IGE QN: <0.1 KUA/L
WALNUT IGE QN: <0.1 KUA/L
WHEAT IGE QN: <0.1 KUA/L
WHITE OAK IGE QN: <0.1 KUA/L

## 2023-05-11 NOTE — ASU PREOP CHECKLIST - HOW ADMINISTERED
24-year-old male with a history of PVCs on diltiazem presents with 4 days of cough, congestion, tonsillitis.  Patient notes his cough is getting worse over the last couple days and presented to the emergency department.  Patient has not had any increase shortness of breath, chest pain, N/V/D/abdominal pain, dysuria, peripheral edema, fever/chills. See MAR for last dose taken

## 2023-05-15 ENCOUNTER — APPOINTMENT (OUTPATIENT)
Dept: PULMONOLOGY | Facility: CLINIC | Age: 48
End: 2023-05-15
Payer: COMMERCIAL

## 2023-05-15 ENCOUNTER — NON-APPOINTMENT (OUTPATIENT)
Age: 48
End: 2023-05-15

## 2023-05-15 PROCEDURE — 99213 OFFICE O/P EST LOW 20 MIN: CPT | Mod: 95

## 2023-05-15 RX ORDER — NIRMATRELVIR AND RITONAVIR 300-100 MG
20 X 150 MG & KIT ORAL
Qty: 30 | Refills: 0 | Status: ACTIVE | COMMUNITY
Start: 2022-08-03 | End: 1900-01-01

## 2023-05-17 ENCOUNTER — NON-APPOINTMENT (OUTPATIENT)
Age: 48
End: 2023-05-17

## 2023-05-17 NOTE — REVIEW OF SYSTEMS
[Hemoptysis] : no hemoptysis [Frequent URIs] : no frequent URIs [Sputum] : no sputum [Pleuritic Pain] : no pleuritic pain [A.M. Dry Mouth] : no a.m. dry mouth [Diabetes] : no diabetes [Thyroid Problem] : no thyroid problem [TextBox_30] : HPI [TextBox_44] : Sinus tachycardia [TextBox_69] : Crohn's colitis not actively on biologic agents

## 2023-05-17 NOTE — HISTORY OF PRESENT ILLNESS
[TextBox_4] : 47-year-old female\par Positive exposure to patient's son in school who is now COVID-positive\par Patient developed over the past 24 hours headache nasal congestion fatigue\par Hot and cold sweats chills\par No documented fever\par Not having any active chest congestion\par Remains on Breztri\par History of component of immunosuppressive based on Crohn's colitis on biologic agent\par

## 2023-05-17 NOTE — PROCEDURE
[FreeTextEntry1] : PFT 5/3/23\par  Flow  rates nl\par  Lung Volumes nl\par  DLCO 107 % pred WNL\par  no decline \par HGB 12.9\par sawtooth pattern r/o TAMMIE\par \par Chest x-ray PA lateral May 3, 2023\par Indication tachycardia chest congestion chest tightness\par Cardiac size is normal\par Lung fields are clear\par No parenchymal infiltrates pleural effusions dominant pulmonary nodules\par Soft tissue bony structures unremarkable\par Mediastinum unremarkable\par Impression clear lungs\par \par PFT September 8, 2022\par Flow rates normal 115 normal\par Diffusion normal 93% predicted\par Data comparison to March 2022 demonstrates overall stable pulmonary physiology\par NIOX  13  ppb WNL 9/8/22\par \par \par PFT 3/16/22\par Flow rates nl\par Lung Volume nl\par DLCO 98 %\par HGB13.0\par \par PFT Oct 13 2021\par Flow rates normal\par Lung volumes normal\par Diffusion normal \par HGB 13.1\par Noted mild decline at DLCO and FVC and FEV1\par \par NIOX 11 ppb\par  [place] No BD 6/30/21\par Normal flows\par \par PFT 4/14/21\par Normal flow rates\par Normal lung Volumes Normal DLCO 99 % pred\par  HGB 13.2\par NIOX 12 ppb 4/ 14 /21\par \par Chest x-ray PA lateral March 3, 2020\par Normal cardiac size\par Clear lungs\par No parenchymal infiltrates pleural effusions or dominant pulmonary nodules\par Soft tissue bony structures unremarkable\par Annalisa mediastinum unremarkable\par No gross interval change compared to chest x-ray orbits 14 2020\par Impression clear lungs\par \par NIOX 14  ppb 9/11/20\par PFT 9/11/2020\par spirometry  normal flow  rates\par lung volumes normal\par normal DLCO\par  HGB 13.1\par \par PFT August 14, 2020\par Normal flow rates\par No response to bronchodilator at the FEV1\par Borderline 17% BD  to bronchodilator at the small airways\par Normal lung volumes\par Diffusion normal 104% of predicted.\par \par NIOX 13 ppb\par Pulmonary 6-minute walk stress test August 14, 2020\par Baseline room air O2 saturation 97% with a heart rate baseline 97 there immediately increased to 115\par Maximum heart rate was 130 at 5 minutes with room air O2 saturation 98%\par Impression sinus tachycardia\par Negative exertional hypoxemia or desaturation\par No indication for portable oxygen therapy\par \par Chest x-ray PA lateral August 14, 2020\par Normal cardiac size\par Lung fields are clear without parenchymal infiltrates pleural effusions or dominant pulmonary nodules\par Soft tissue bony structures grossly unremarkable\par Annalisa mediastinum grossly unremarkable\par Impression clear lungs\par \par Blood draw\par \par

## 2023-05-17 NOTE — DISCUSSION/SUMMARY
[FreeTextEntry1] : COVID 19 infection pos Home Rapid Ag \par Medications\par Oral Paxlovid\par Reviewed all medications will hold statin\par Maintain O2 saturations greater than 93% otherwise notify immediately\par Any progression of symptoms notify office immediately\par Reschedule office follow-up\par \par Asthma \par discuss re GERD prilosec + Pepcid\par Chronic persistent asthma\par Normal pulmonary physiology with normal NIOX and 6-minute walk test\par History of mucoid impaction\par Hx prior negative Aspergillus titers\par Sinus tachycardia\par Crohn's colitis without treatment with biologic agent\par  component of anxiety depressive disorder\par \par As per cardiology patient is having a beta-blocker added will need to monitor spirometric data and clinical symptoms- OFF\par Advair Rx- HOLD Trial BREZTRI 2 puffs BID\par Notify of any wheezing. Notify if rescue inhaler is needed greater than 2-3 times in any week. Avoid known triggers.\par Notify of any wheezing.  Notify if rescue inhaler is needed greater than 2-3 times in any week.  Avoid known triggers.\par \par  Pfizer vaccination completed P:us Sept booster\par \par

## 2023-05-22 ENCOUNTER — NON-APPOINTMENT (OUTPATIENT)
Age: 48
End: 2023-05-22

## 2023-05-24 ENCOUNTER — EMERGENCY (EMERGENCY)
Facility: HOSPITAL | Age: 48
LOS: 1 days | Discharge: HOME | End: 2023-05-24
Attending: EMERGENCY MEDICINE | Admitting: EMERGENCY MEDICINE
Payer: COMMERCIAL

## 2023-05-24 ENCOUNTER — NON-APPOINTMENT (OUTPATIENT)
Age: 48
End: 2023-05-24

## 2023-05-24 VITALS
TEMPERATURE: 98 F | SYSTOLIC BLOOD PRESSURE: 113 MMHG | HEART RATE: 105 BPM | HEIGHT: 66 IN | RESPIRATION RATE: 18 BRPM | WEIGHT: 195.11 LBS | OXYGEN SATURATION: 100 % | DIASTOLIC BLOOD PRESSURE: 60 MMHG

## 2023-05-24 VITALS
HEART RATE: 92 BPM | DIASTOLIC BLOOD PRESSURE: 80 MMHG | OXYGEN SATURATION: 100 % | RESPIRATION RATE: 18 BRPM | TEMPERATURE: 98 F | SYSTOLIC BLOOD PRESSURE: 146 MMHG

## 2023-05-24 DIAGNOSIS — Z98.890 OTHER SPECIFIED POSTPROCEDURAL STATES: Chronic | ICD-10-CM

## 2023-05-24 LAB
ALBUMIN SERPL ELPH-MCNC: 3.5 G/DL — SIGNIFICANT CHANGE UP (ref 3.3–5)
ALP SERPL-CCNC: 83 U/L — SIGNIFICANT CHANGE UP (ref 40–120)
ALT FLD-CCNC: 45 U/L — SIGNIFICANT CHANGE UP (ref 12–78)
ANION GAP SERPL CALC-SCNC: 9 MMOL/L — SIGNIFICANT CHANGE UP (ref 5–17)
AST SERPL-CCNC: 25 U/L — SIGNIFICANT CHANGE UP (ref 15–37)
BASOPHILS # BLD AUTO: 0.02 K/UL — SIGNIFICANT CHANGE UP (ref 0–0.2)
BASOPHILS NFR BLD AUTO: 0.2 % — SIGNIFICANT CHANGE UP (ref 0–2)
BILIRUB SERPL-MCNC: 0.2 MG/DL — SIGNIFICANT CHANGE UP (ref 0.2–1.2)
BUN SERPL-MCNC: 10 MG/DL — SIGNIFICANT CHANGE UP (ref 7–23)
CALCIUM SERPL-MCNC: 8.7 MG/DL — SIGNIFICANT CHANGE UP (ref 8.5–10.1)
CHLORIDE SERPL-SCNC: 107 MMOL/L — SIGNIFICANT CHANGE UP (ref 96–108)
CO2 SERPL-SCNC: 23 MMOL/L — SIGNIFICANT CHANGE UP (ref 22–31)
CREAT SERPL-MCNC: 1.1 MG/DL — SIGNIFICANT CHANGE UP (ref 0.5–1.3)
EGFR: 63 ML/MIN/1.73M2 — SIGNIFICANT CHANGE UP
EOSINOPHIL # BLD AUTO: 0.03 K/UL — SIGNIFICANT CHANGE UP (ref 0–0.5)
EOSINOPHIL NFR BLD AUTO: 0.4 % — SIGNIFICANT CHANGE UP (ref 0–6)
GLUCOSE SERPL-MCNC: 90 MG/DL — SIGNIFICANT CHANGE UP (ref 70–99)
HCT VFR BLD CALC: 38.8 % — SIGNIFICANT CHANGE UP (ref 34.5–45)
HGB BLD-MCNC: 13.3 G/DL — SIGNIFICANT CHANGE UP (ref 11.5–15.5)
IMM GRANULOCYTES NFR BLD AUTO: 0.4 % — SIGNIFICANT CHANGE UP (ref 0–0.9)
LYMPHOCYTES # BLD AUTO: 1.21 K/UL — SIGNIFICANT CHANGE UP (ref 1–3.3)
LYMPHOCYTES # BLD AUTO: 15.1 % — SIGNIFICANT CHANGE UP (ref 13–44)
MCHC RBC-ENTMCNC: 29.3 PG — SIGNIFICANT CHANGE UP (ref 27–34)
MCHC RBC-ENTMCNC: 34.3 GM/DL — SIGNIFICANT CHANGE UP (ref 32–36)
MCV RBC AUTO: 85.5 FL — SIGNIFICANT CHANGE UP (ref 80–100)
MONOCYTES # BLD AUTO: 0.84 K/UL — SIGNIFICANT CHANGE UP (ref 0–0.9)
MONOCYTES NFR BLD AUTO: 10.5 % — SIGNIFICANT CHANGE UP (ref 2–14)
NEUTROPHILS # BLD AUTO: 5.89 K/UL — SIGNIFICANT CHANGE UP (ref 1.8–7.4)
NEUTROPHILS NFR BLD AUTO: 73.4 % — SIGNIFICANT CHANGE UP (ref 43–77)
NRBC # BLD: 0 /100 WBCS — SIGNIFICANT CHANGE UP (ref 0–0)
PLATELET # BLD AUTO: 296 K/UL — SIGNIFICANT CHANGE UP (ref 150–400)
POTASSIUM SERPL-MCNC: 3.5 MMOL/L — SIGNIFICANT CHANGE UP (ref 3.5–5.3)
POTASSIUM SERPL-SCNC: 3.5 MMOL/L — SIGNIFICANT CHANGE UP (ref 3.5–5.3)
PROT SERPL-MCNC: 7 G/DL — SIGNIFICANT CHANGE UP (ref 6–8.3)
RBC # BLD: 4.54 M/UL — SIGNIFICANT CHANGE UP (ref 3.8–5.2)
RBC # FLD: 14.4 % — SIGNIFICANT CHANGE UP (ref 10.3–14.5)
SODIUM SERPL-SCNC: 139 MMOL/L — SIGNIFICANT CHANGE UP (ref 135–145)
TROPONIN I, HIGH SENSITIVITY RESULT: <3 NG/L — SIGNIFICANT CHANGE UP
WBC # BLD: 8.02 K/UL — SIGNIFICANT CHANGE UP (ref 3.8–10.5)
WBC # FLD AUTO: 8.02 K/UL — SIGNIFICANT CHANGE UP (ref 3.8–10.5)

## 2023-05-24 PROCEDURE — 36415 COLL VENOUS BLD VENIPUNCTURE: CPT

## 2023-05-24 PROCEDURE — 84484 ASSAY OF TROPONIN QUANT: CPT

## 2023-05-24 PROCEDURE — 71045 X-RAY EXAM CHEST 1 VIEW: CPT

## 2023-05-24 PROCEDURE — 99285 EMERGENCY DEPT VISIT HI MDM: CPT

## 2023-05-24 PROCEDURE — 99285 EMERGENCY DEPT VISIT HI MDM: CPT | Mod: 25

## 2023-05-24 PROCEDURE — 93005 ELECTROCARDIOGRAM TRACING: CPT

## 2023-05-24 PROCEDURE — 93010 ELECTROCARDIOGRAM REPORT: CPT

## 2023-05-24 PROCEDURE — 85025 COMPLETE CBC W/AUTO DIFF WBC: CPT

## 2023-05-24 PROCEDURE — 71045 X-RAY EXAM CHEST 1 VIEW: CPT | Mod: 26

## 2023-05-24 PROCEDURE — 80053 COMPREHEN METABOLIC PANEL: CPT

## 2023-05-24 RX ORDER — SODIUM CHLORIDE 9 MG/ML
1000 INJECTION INTRAMUSCULAR; INTRAVENOUS; SUBCUTANEOUS ONCE
Refills: 0 | Status: COMPLETED | OUTPATIENT
Start: 2023-05-24 | End: 2023-05-24

## 2023-05-24 RX ADMIN — SODIUM CHLORIDE 1000 MILLILITER(S): 9 INJECTION INTRAMUSCULAR; INTRAVENOUS; SUBCUTANEOUS at 11:32

## 2023-05-24 NOTE — ED PROVIDER NOTE - NSFOLLOWUPINSTRUCTIONS_ED_ALL_ED_FT
-- You should update your primary care physician on your Emergency Department visit and follow up with them.  If you do not have a physician or have difficulty following up, please call: [phone] to obtain a [facility] doctor or specialist who can provide follow up.    -- Return to the ER for worsening or persistent symptoms, and/or ANY NEW OR CONCERNING SYMPTOMS.

## 2023-05-24 NOTE — ED ADULT TRIAGE NOTE - AS PAIN REST
Continue Regimen: triamcinolone acetonide 0.1 % topical ointment : Apply twice per day to the rash on the hands. Only use as needed for itching or irritation.
Detail Level: Zone
Continue Regimen: Derma-Smoothe/FS Scalp Oil 0.01 % BID prn hair loss, itching or irritation
5 (moderate pain)
Otc Regimen: T gel shampoo
Continue Regimen: ketoconazole 2 % shampoo : Shampoo scalp and lather on face 2-3 times weekly, work into a lather, and leave on for 5-10 minutes before rinsing with water.\\n\\nDerma-Smoothe/FS Scalp Oil 0.01 % : Apply to affected areas twice a day for up to two weeks then only use as needed for itching or irritation.

## 2023-05-24 NOTE — ED PROVIDER NOTE - PATIENT PORTAL LINK FT
You can access the FollowMyHealth Patient Portal offered by [facility] by registering at the following website: [email or web address]. By joining [facility]’s FollowMyHealth portal, you will also be able to view your health information using other applications (apps) compatible with our system.

## 2023-05-24 NOTE — ED PROVIDER NOTE - CLINICAL SUMMARY MEDICAL DECISION MAKING FREE TEXT BOX
pt with covid now on day 10, comes in for palpitations pt with covid now on day 10, comes in for palpitations, ED work up unremarkable, sx possibly due to combination of anxiety /fever, stable for dc

## 2023-05-24 NOTE — ED ADULT NURSE NOTE - OBJECTIVE STATEMENT
Pt received in bed alert and oriented and resting in bed with the c/o left sided chest pain side that started since last night. Pt stated that she has been COVID+ since 5/15/23. As per MD's orders IV mathew placed blood specimen obtained and sent to the lab. Pt stable and nursing care ongoing and safety maintained

## 2023-05-24 NOTE — ED ADULT TRIAGE NOTE - CHIEF COMPLAINT QUOTE
c/o chest pain. tested positive for covid on 5/15, took 3 doses of paxlovid, had relief of symptoms and then started having chest pain last night. denies dizziness, lightheadedness, sob, n/v. brought directly to rm 1.

## 2023-05-24 NOTE — ED PROVIDER NOTE - CARE PLAN
Principal Discharge DX:	2019 novel coronavirus disease (COVID-19)  Secondary Diagnosis:	Palpitations   1

## 2023-05-24 NOTE — ED ADULT NURSE NOTE - NSFALLRISKINTERV_ED_ALL_ED
Assistance OOB with selected safe patient handling equipment if applicable/Communicate fall risk and risk factors to all staff, patient, and family/Provide visual cue: yellow wristband, yellow gown, etc/Reinforce activity limits and safety measures with patient and family/Use of alarms - bed, stretcher, chair and/or video monitoring/Call bell, personal items and telephone in reach/Instruct patient to call for assistance before getting out of bed/chair/stretcher/Non-slip footwear applied when patient is off stretcher/[place] to call system/Physically safe environment - no spills, clutter or unnecessary equipment/Purposeful Proactive Rounding/Room/bathroom lighting operational, light cord in reach

## 2023-05-24 NOTE — ED ADULT NURSE NOTE - SUICIDE SCREENING QUESTION 1
Patient was seen yesterday, but today she now presents with body aches.     She is concerned she has the flu. Per Dr. Hooper, patient is scheduled for a nursing visit to be tested for Flu.    Informed patient and she scheduled appt for this afternoon.    Patient verbalized understanding and all questions answered at this time.   No

## 2023-05-24 NOTE — ED PROVIDER NOTE - OBJECTIVE STATEMENT
pt states that she is now on her 10th day of COVID infection, took 3 days of paxlovid last week, been getting better but then last night had another fever, this morning felt some palpitations and took her HR on her pulse ox and it red 150s so her PMD told her to come to the ER. pt states that she is feeling better now, denies any cp or sob. pt has h/o asthma and took her advair this morning.  h/o crohns but in remission. h/o anxiety on medications for same. pt states that her HR is always over 100 and she is scheduled to fu with cardiology.

## 2023-06-07 NOTE — H&P ADULT - PROBLEM/PLAN-8
Patient arrives complaining of intermittent shortness of breath and left sided chest pain described as pressure since 0400. Patient also has left hand numbness and tingling. BEFAST negative. Patient denies lightheadedness, dizziness, palpitations.   DISPLAY PLAN FREE TEXT

## 2023-06-09 ENCOUNTER — APPOINTMENT (OUTPATIENT)
Dept: PULMONOLOGY | Facility: CLINIC | Age: 48
End: 2023-06-09
Payer: COMMERCIAL

## 2023-06-09 VITALS
OXYGEN SATURATION: 99 % | DIASTOLIC BLOOD PRESSURE: 79 MMHG | SYSTOLIC BLOOD PRESSURE: 113 MMHG | HEART RATE: 94 BPM | RESPIRATION RATE: 16 BRPM

## 2023-06-09 DIAGNOSIS — R00.0 TACHYCARDIA, UNSPECIFIED: ICD-10-CM

## 2023-06-09 PROCEDURE — 95800 SLP STDY UNATTENDED: CPT

## 2023-06-09 PROCEDURE — 99214 OFFICE O/P EST MOD 30 MIN: CPT | Mod: 25

## 2023-06-09 PROCEDURE — 94727 GAS DIL/WSHOT DETER LNG VOL: CPT

## 2023-06-09 PROCEDURE — 94729 DIFFUSING CAPACITY: CPT

## 2023-06-09 PROCEDURE — 94010 BREATHING CAPACITY TEST: CPT

## 2023-06-09 PROCEDURE — ZZZZZ: CPT

## 2023-06-09 NOTE — PROCEDURE
[FreeTextEntry1] : PFT post COVID-19 infection July 2023\par Spirometry normal\par Lung dimes normal\par % predicted\par Diffusion is normal range 92% predicted but there is a significant decline compared to May 30, 2023\par Clinical correlation rule out post COVID-19 infection\par Clinical exam did not suggest COVID-pneumonia\par \par PFT 5/3/23\par  Flow  rates nl\par  Lung Volumes nl\par  DLCO 107 % pred WNL\par  no decline \par HGB 12.9\par sawtooth pattern r/o TAMMIE\par \par Chest x-ray PA lateral May 3, 2023\par Indication tachycardia chest congestion chest tightness\par Cardiac size is normal\par Lung fields are clear\par No parenchymal infiltrates pleural effusions dominant pulmonary nodules\par Soft tissue bony structures unremarkable\par Mediastinum unremarkable\par Impression clear lungs\par \par PFT September 8, 2022\par Flow rates normal 115 normal\par Diffusion normal 93% predicted\par Data comparison to March 2022 demonstrates overall stable pulmonary physiology\par NIOX  13  ppb WNL 9/8/22\par \par \par PFT 3/16/22\par Flow rates nl\par Lung Volume nl\par DLCO 98 %\par HGB13.0\par \par PFT Oct 13 2021\par Flow rates normal\par Lung volumes normal\par Diffusion normal \par HGB 13.1\par Noted mild decline at DLCO and FVC and FEV1\par \par NIOX 11 ppb\par  [place] No BD 6/30/21\par Normal flows\par \par PFT 4/14/21\par Normal flow rates\par Normal lung Volumes Normal DLCO 99 % pred\par  HGB 13.2\par NIOX 12 ppb 4/ 14 /21\par \par Chest x-ray PA lateral March 3, 2020\par Normal cardiac size\par Clear lungs\par No parenchymal infiltrates pleural effusions or dominant pulmonary nodules\par Soft tissue bony structures unremarkable\par Annalisa mediastinum unremarkable\par No gross interval change compared to chest x-ray orbits 14 2020\par Impression clear lungs\par \par NIOX 14  ppb 9/11/20\par PFT 9/11/2020\par spirometry  normal flow  rates\par lung volumes normal\par normal DLCO\par  HGB 13.1\par \par PFT August 14, 2020\par Normal flow rates\par No response to bronchodilator at the FEV1\par Borderline 17% BD  to bronchodilator at the small airways\par Normal lung volumes\par Diffusion normal 104% of predicted.\par \par NIOX 13 ppb\par Pulmonary 6-minute walk stress test August 14, 2020\par Baseline room air O2 saturation 97% with a heart rate baseline 97 there immediately increased to 115\par Maximum heart rate was 130 at 5 minutes with room air O2 saturation 98%\par Impression sinus tachycardia\par Negative exertional hypoxemia or desaturation\par No indication for portable oxygen therapy\par \par Chest x-ray PA lateral August 14, 2020\par Normal cardiac size\par Lung fields are clear without parenchymal infiltrates pleural effusions or dominant pulmonary nodules\par Soft tissue bony structures grossly unremarkable\par Annalisa mediastinum grossly unremarkable\par Impression clear lungs\par \par Blood draw\par \par

## 2023-06-09 NOTE — HISTORY OF PRESENT ILLNESS
[Never] : never [Daytime Somnolence] : daytime somnolence [Snoring] : snoring [Unintentional Sleep while Inactive] : unintentional sleep while inactive [TextBox_4] : COVID 19 infection pos Home Rapid Ag \par Medications\par Oral Paxlovid\par \par Cardiology [facility] cardiology impression\par Chest pain\par Shortness of breath\par Palpitations\par Hyperlipidemia\par Stress echo with Doppler loop monitor\par Echocardiogram\par If testing is normal per cardiology consider [place] to slow the inappropriate sinus tachycardia\par Hyperlipidemia\par \par Review of emergency department physician note\par [place] [facility]\par As noted date of the 10th post COVID infection\par Only completed 3 days Paxlovid 1 week prior\par Has been getting better but then had fever last night this morning with complaint of palpitations stated heart rate was 150 and referred to the emergency department\par History asthma did use Advair this morning\par History Crohn's in the remission\par Exam respiratory negative\par Cardiovascular regular\par EKG sinus tachycardia\par Was discharged to follow-up cardiology\par Troponin comprehensive metabolic profile CBC normal range May 24, 2023 chest x-ray\par Clear lung no evidence for COVID-pneumonia\par Noted that she did not have a D-dimer\par Patient to be notified for office follow-up cannot exclude component of COVID \par \par noted inc HR / SOB  self txed xanax\par mild inc asthma sxs nocturnal with improvement with BREZTRI 2  puffs BID\par added inhaler spacer\par chest tightness/ pressure\par feels inability to take deep  breath\par states cardiology about 2 years ago re inc HR ? prn use BB\par \par  told  severe reflux\par \par 45-year-old female\par Pulmonary evaluation\par Chief complaint chronic chest congestion occasional wheeze with a history of asthma\par Patient states is a longstanding asthma diagnosed onset childhood\par She states that 2018 she was admitted and treated for bilateral pneumonia [facility]\par In addition at that time being treated with antibiotics bronchodilators she underwent a bronchoscopy and had friable airways with copious mucoid secretions\par States since then she feels like a long-term asthma status has been worse\par She describes occasional but chronic wheeze as well as chest tightness chest congestion\par There is some associated shortness of breath\par She also admits there is some component of anxiety\par She is also been monitoring her heart rate with a Fitbit type watch as well as has tachycardia and is undergone cardiac evaluation and was prescribed a beta-blocker for which she has yet picked up\par Denies any sputum production or hemoptysis\par No fevers chills or sweats\par Her other medical history is significant for Crohn's colitis with no treatment for biologic agents\par No reported history of chemical toxic or inhalational exposures\par She states this past week or 2 she is used her rescue inhaler Ventolin well greater than 2-3 times\par She is on Symbicort 160 just 4.52 puffs twice daily\par \par Seasonal allergies reported\par   3 para 3 menopausal\par \par Non-smoker\par No alcohol use\par \par Hospital data review 2018\par CT angiogram\par Negative for pulmonary embolism\par  prior groundglass and tree-in-bud opacities resolved\par \par \par Additional data review 2017 lupus anticoagulant silica clotting time positive\par Prior d-dimer is negative\par 2017 ANCA negative atypical ANCA negative Aspergillus negative [place] antibody negative Mycoplasma IgG +2017 AFB bronchoscopy negative bronchoscopy special stains negative. \par Smoking Status: never \par eCigarettes: never \par Marijuana use: never \par

## 2023-06-09 NOTE — DISCUSSION/SUMMARY
[FreeTextEntry1] : COVID-19 infection\par Sinus tachycardia cardiology note reviewed\par Rule out obstructive sleep apnea-obstructive sleep apnea treatment protocol discussed pending formal sleep study\par \par Present stable asthma no active symptoms\par Asthma flare -  clinically improved- Z Raúl / medrol raúl- trial BREXTRI with sample  consider singulair if nec NOT Active\par discuss re GERD prilosec + Pepcid\par Chronic persistent asthma\par Normal pulmonary physiology with normal NIOX and 6-minute walk test\par History of mucoid impaction\par Hx prior negative Aspergillus titers\par Sinus tachycardia\par Crohn's colitis without treatment with biologic agent\par  component of anxiety depressive disorder\par \par As per cardiology patient is having a beta-blocker added will need to monitor spirometric data and clinical symptoms- OFF\par Advair Rx- HOLD Trial BREZTRI 2 puffs BID\par Notify of any wheezing. Notify if rescue inhaler is needed greater than 2-3 times in any week. Avoid known triggers.\par Notify of any wheezing.  Notify if rescue inhaler is needed greater than 2-3 times in any week.  Avoid known triggers.\par \par  Pfizer vaccination completed P:us Sept booster\par check TFT sec inc HR\par  r/o TAMMIE  HSS\par \par Asthma panel noted\par Thyroid panel noted\par Office follow-up 1 month following the study

## 2023-06-12 PROCEDURE — 95800 SLP STDY UNATTENDED: CPT

## 2023-06-24 NOTE — ED PROVIDER NOTE - TOBACCO USE
1. Urine dipstick  2. Urine cx  3. Start trial of D-mannos and cranberry pills for bladder health.   4. Follow-up in 3 months.     
Never smoker
Davy (Konrad) Sacha is a 24? 33? yo M, single, unclear living situation, with psych history of polysubstance misuse (per chart has used meth, MJ, opioids), substance-induced mood disorder vs schizoaffective disorder, per PSYCKES is a high utilizer of inpatient and ER services, including multiple ED visits in which he initially expresses active SI and then later retracts, numerous psych admissions, per records has h/o 2 remote SA by hanging and drinking bleach, PMH significant for HIV but has a documented h/o poor adherence with care, as well as suspected hyperthyroidism, hx of having ACT/AOT. To note, patient has previously been filed under Konrad Sacha MRN [id number].    Patient denies any current passive or active suicidal ideation, intent or plan and denies any homicidal ideation. Patient denies any persecutory delusions and denies any auditory or visual hallucinations. Patient was able to complete safety plan. As patient is not currently manic, suicidal, homicidal, psychotic, he will not benefit from inpatient psychiatric hospitalization. Patient is clear for discharge from psychiatric standpoint.

## 2023-06-28 ENCOUNTER — APPOINTMENT (OUTPATIENT)
Dept: PULMONOLOGY | Facility: CLINIC | Age: 48
End: 2023-06-28
Payer: COMMERCIAL

## 2023-06-28 DIAGNOSIS — G47.33 OBSTRUCTIVE SLEEP APNEA (ADULT) (PEDIATRIC): ICD-10-CM

## 2023-06-28 PROCEDURE — 99442: CPT

## 2023-06-30 PROBLEM — G47.33 OSA (OBSTRUCTIVE SLEEP APNEA): Status: ACTIVE | Noted: 2023-06-14

## 2023-06-30 NOTE — DISCUSSION/SUMMARY
[FreeTextEntry1] : The pathophysiology as well as medical implications of untreated obstructive sleep apnea syndrome were discussed with this patient. Treatment options including CPAP therapy, Inspire,  mandibular advancement device and weight loss were also discussed; patient would like to attempt to consult for an oral appliance \par \par Patient has scheduled for Dr Fuentes on 07/06/2023

## 2023-06-30 NOTE — HISTORY OF PRESENT ILLNESS
[TextBox_4] : Followup for sleep apnea\par Patient here to review results of home sleep study.\par No change in history as reported on initial evaluation

## 2023-12-26 ENCOUNTER — APPOINTMENT (OUTPATIENT)
Dept: NEUROLOGY | Facility: CLINIC | Age: 48
End: 2023-12-26
Payer: COMMERCIAL

## 2023-12-26 ENCOUNTER — LABORATORY RESULT (OUTPATIENT)
Age: 48
End: 2023-12-26

## 2023-12-26 ENCOUNTER — NON-APPOINTMENT (OUTPATIENT)
Age: 48
End: 2023-12-26

## 2023-12-26 VITALS
SYSTOLIC BLOOD PRESSURE: 130 MMHG | HEART RATE: 120 BPM | HEIGHT: 66 IN | BODY MASS INDEX: 32.14 KG/M2 | WEIGHT: 200 LBS | DIASTOLIC BLOOD PRESSURE: 88 MMHG

## 2023-12-26 DIAGNOSIS — R51.9 HEADACHE, UNSPECIFIED: ICD-10-CM

## 2023-12-26 DIAGNOSIS — R41.89 OTHER SYMPTOMS AND SIGNS INVOLVING COGNITIVE FUNCTIONS AND AWARENESS: ICD-10-CM

## 2023-12-26 LAB
25(OH)D3 SERPL-MCNC: 33.5 NG/ML
CRP SERPL-MCNC: <3 MG/L
FOLATE SERPL-MCNC: >20 NG/ML
HCYS SERPL-MCNC: 8.9 UMOL/L
HIV1+2 AB SPEC QL IA.RAPID: NONREACTIVE
VIT B12 SERPL-MCNC: 485 PG/ML

## 2023-12-26 PROCEDURE — 99204 OFFICE O/P NEW MOD 45 MIN: CPT

## 2023-12-26 NOTE — REVIEW OF SYSTEMS
[Memory Lapses or Loss] : memory loss [Decr. Concentrating Ability] : decreased concentrating ability [Difficulty with Language] : ~M difficulty with language [Migraine Headache] : migraine headaches [Eyesight Problems] : eyesight problems [Negative] : Heme/Lymph

## 2023-12-26 NOTE — ASSESSMENT
[FreeTextEntry1] : Mrs. Melgar is a 48-year-old with probable common migraine which has evolved into a chronic daily headache syndrome.  There may be an element of rebound headache given her frequent use of acetaminophen.  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  She is being treated with duloxetine, buspirone and bupropion for depression.  I suggested that she undergo an updated MRI of the brain with and without contrast.  She will undergo a comprehensive serologic evaluation.  I suggested that she begin treatment for chronic daily headache/rebound headache.  She is incapable of taking beta-blockers due to asthma, topiramate due to already existing brain fog and Depakote would be counterproductive given her weight issues.  I suggested a trial of either an injectable or oral CGRP antagonist.  She wishes to take Qulipta.  Further management will depend upon the results of these studies and her clinical course.

## 2023-12-26 NOTE — HISTORY OF PRESENT ILLNESS
[FreeTextEntry1] : Mrs. Argenis Melagr is a 48-year-old right-handed patient who was referred for neurologic evaluation at your kind suggestion.  Mrs. Melgar is  with 3 children aged 17, 18 and 21.  She works for [employer].  She has a history of depression for which she is currently on duloxetine 60 mg daily, buspirone and bupropion.  She complains of episodic headaches in her youth.  Since age 32, she has been experiencing more frequent headaches.  These are typically dull and aching but occasionally throbbing and involve her forehead, occiput and neck.  On occasion, they are accompanied by nausea.  There are no visual auras or environmental or food triggers.  Her headaches have become more frequent over the past year.  They occur at least 4 times a week.  She has been taking acetaminophen 4 or 5 times weekly.  She was evaluated by neurologist in the past.  She underwent MRI of the brain in 2019 which was unremarkable.  A cervical MRI performed in 2022 revealed straightening of the normal cervical lordosis but no other abnormalities.  Another MRI of the brain apparently revealed an arachnoid cyst.  She was prescribed medications but never took them.  She also complains of brain fog.  On 1 occasion, she had difficulty walking up a flight of stairs and another occasion problems speaking.  She has mild short-term memory loss.  She has a tremor in her hands just like her father.  Past surgical history is notable for removal of a benign mass from her foot.  She suffers from Crohn's disease since age 15 which has been in remission.  She was treated in the past with prednisone, Asacol and mercaptopurine.  She suffers from hyperlipidemia, asthma, GERD and headaches.  There is no history of hypertension, diabetes, coronary, arrhythmia, renal, hepatic, thyroid, hematologic or cerebrovascular disease.  She has an intolerance to Demerol.  Medications include duloxetine, buspirone, bupropion, omeprazole and rosuvastatin.  She is a non-smoker and nondrinker.  She is .  Family history is notable for a mother with seizures, depression and thyroid cancer.  Her father suffers from dementia and Crohn's disease.  Her brother suffers from thyroid cancer.

## 2023-12-26 NOTE — PHYSICAL EXAM
[FreeTextEntry1] : Constitutional:  Patient was well-developed, well-nourished and in no acute distress.   Head:  Normocephalic, atraumatic. Tympanic membranes were clear.   Neck:  Supple with full range of motion.   Cardiovascular:  Cardiac rhythm was regular without murmur. There were no carotid bruits. Peripheral pulses were full and symmetric.   Respiratory:  Lungs were clear.   Abdomen:  Soft and nontender.   Spine:  Nontender.   Skin:  There were no rashes.   NEUROLOGICAL EXAMINATION:  Mental Status: Patient was alert and oriented. Speech was fluent. There was no dysarthria.  She scored 29 out of 30 on MMSE making 1 error in orientation stating that it was Wednesday rather than Tuesday.  Cranial Nerves:   II: Visual acuity was 20/25 in the right eye and 20/25-1 in the left eye with near card. Pupils were equal and reactive. Visual fields were full. Funduscopic examination was normal.   III, IV, VI:  Eye movements were full without nystagmus.   V: Facial sensation was intact.   VII: Facial strength was normal.   VIII: Hearing was equal.   IX, X: Palatal movement was normal. Phonation was normal.   XI: Sternocleidomastoids and trapezii were normal.   XII: Tongue was midline and movements normal. There was no lingual atrophy or fasciculations.   Motor Examination: Muscle bulk, tone and strength were normal.  There is a mild postural tremor in her outstretched hands.  Sensory Examination: Pinprick, vibration and joint position sense were intact.   Reflexes: DTRs were 2+ throughout except for the ankles where there were a few beats of clonus.   Plantar Responses: Plantar responses were flexor.   Coordination/Cerebellar Function: There was no dysmetria on finger to nose or heel to shin testing.   Gait/Stance: Gait and tandem were normal. Romberg was negative.

## 2023-12-27 ENCOUNTER — TRANSCRIPTION ENCOUNTER (OUTPATIENT)
Age: 48
End: 2023-12-27

## 2023-12-27 LAB
ERYTHROCYTE [SEDIMENTATION RATE] IN BLOOD BY WESTERGREN METHOD: 12 MM/HR
T PALLIDUM AB SER QL IA: NEGATIVE

## 2023-12-28 LAB
ANACR T: NEGATIVE
IGA 24H UR QL IFE: NORMAL

## 2023-12-28 RX ORDER — GALCANEZUMAB 120 MG/ML
120 INJECTION, SOLUTION SUBCUTANEOUS
Qty: 2 | Refills: 5 | Status: ACTIVE | COMMUNITY
Start: 2023-12-27

## 2023-12-28 RX ORDER — GALCANEZUMAB 120 MG/ML
120 INJECTION, SOLUTION SUBCUTANEOUS
Qty: 1 | Refills: 11 | Status: ACTIVE | COMMUNITY
Start: 2023-12-27 | End: 1900-01-01

## 2023-12-29 LAB — METHYLMALONATE SERPL-SCNC: 165 NMOL/L

## 2023-12-29 RX ORDER — ATOGEPANT 60 MG/1
60 TABLET ORAL
Qty: 90 | Refills: 3 | Status: ACTIVE | COMMUNITY
Start: 2023-12-26 | End: 1900-01-01

## 2023-12-30 LAB
ALBUMIN MFR SERPL ELPH: 60.3 %
ALBUMIN SERPL-MCNC: 4.1 G/DL
ALBUMIN/GLOB SERPL: 1.5 RATIO
ALPHA1 GLOB MFR SERPL ELPH: 5.4 %
ALPHA1 GLOB SERPL ELPH-MCNC: 0.4 G/DL
ALPHA2 GLOB MFR SERPL ELPH: 10.4 %
ALPHA2 GLOB SERPL ELPH-MCNC: 0.7 G/DL
B-GLOBULIN MFR SERPL ELPH: 12.6 %
B-GLOBULIN SERPL ELPH-MCNC: 0.9 G/DL
DEPRECATED KAPPA LC FREE/LAMBDA SER: 1.45 RATIO
GAMMA GLOB FLD ELPH-MCNC: 0.8 G/DL
GAMMA GLOB MFR SERPL ELPH: 11.3 %
IGA SER QL IEP: 162 MG/DL
IGG SER QL IEP: 688 MG/DL
IGM SER QL IEP: 208 MG/DL
INTERPRETATION SERPL IEP-IMP: NORMAL
KAPPA LC CSF-MCNC: 1.43 MG/DL
KAPPA LC SERPL-MCNC: 2.07 MG/DL
M PROTEIN SPEC IFE-MCNC: NORMAL
PROT SERPL-MCNC: 6.8 G/DL
PROT SERPL-MCNC: 6.8 G/DL

## 2024-01-02 LAB
AMPA-R ABCBA: NEGATIVE
AMPHIPHYSIN IGG TITR SER IF: NEGATIVE
ANNOTATION COMMENT IMP: NORMAL
CASPR2-IGG CBA, S: NEGATIVE
CV2 IGG TITR SER: NEGATIVE
GABA-B ABCBA: NEGATIVE
GAD65 AB SER-MCNC: 0 NMOL/L
GLIAL NUC TYPE 1 AB TITR SER: NEGATIVE
HU1 AB TITR SER: NEGATIVE
HU2 AB TITR SER IF: NEGATIVE
HU3 AB TITR SER: NEGATIVE
IGLON5 IFA, S: NEGATIVE
IMMUNOLOGIST REVIEW: NORMAL
LGI1-IGG CBA, S: NEGATIVE
NIF IFA, S: NEGATIVE
NMDA-R ABCBA: NEGATIVE
PCA-1 AB TITR SER: NEGATIVE
PCA-2 AB TITR SER: NEGATIVE
PCA-TR AB TITR SER: NEGATIVE
VIT B1 SERPL-MCNC: 107.9 NMOL/L

## 2024-01-07 ENCOUNTER — NON-APPOINTMENT (OUTPATIENT)
Age: 49
End: 2024-01-07

## 2024-01-17 ENCOUNTER — APPOINTMENT (OUTPATIENT)
Dept: MRI IMAGING | Facility: CLINIC | Age: 49
End: 2024-01-17
Payer: COMMERCIAL

## 2024-01-17 ENCOUNTER — OUTPATIENT (OUTPATIENT)
Dept: OUTPATIENT SERVICES | Facility: HOSPITAL | Age: 49
LOS: 1 days | End: 2024-01-17
Payer: COMMERCIAL

## 2024-01-17 DIAGNOSIS — Z98.890 OTHER SPECIFIED POSTPROCEDURAL STATES: Chronic | ICD-10-CM

## 2024-01-17 DIAGNOSIS — R51.9 HEADACHE, UNSPECIFIED: ICD-10-CM

## 2024-01-17 DIAGNOSIS — R41.89 OTHER SYMPTOMS AND SIGNS INVOLVING COGNITIVE FUNCTIONS AND AWARENESS: ICD-10-CM

## 2024-01-17 DIAGNOSIS — Z00.8 ENCOUNTER FOR OTHER GENERAL EXAMINATION: ICD-10-CM

## 2024-01-17 PROCEDURE — A9585: CPT

## 2024-01-17 PROCEDURE — 70553 MRI BRAIN STEM W/O & W/DYE: CPT | Mod: 26

## 2024-01-17 PROCEDURE — 70553 MRI BRAIN STEM W/O & W/DYE: CPT

## 2024-01-18 NOTE — ED PROVIDER NOTE - CPE EDP EYES NORM
DR GILMORE PT   BATTERY 12 YRS REMAINING   [employer] DUAL PACER INITIAL CHECK IN OFFICE     3830 LBB LEAD    DDD     A PACED <0.1%  V PACED 99.9%    PRESENTS IN AFIB/     UNDERLYING AFIB VS 30'S      FIB WAVES 5.9  RV WAVES >20    ATRIAL IMPEDENCE 703  VENT IMPEDENCE 464    VENT THRESHOLD 1 @ 0.4    VENT AMPLITUDE 3.5 @ 0.4  ATRIAL AMPLITUDE 3.5 @ 0.4    INCISION LINE WITH STERI STRIPS REMOVED AND AREA CLEANED WITH CHLORA PREP AND NEW STERI STRIPS APPLIED. REVIEWED ALL POST OP INSTRUCTIONS WITH PT AND DAUGHTER   PRESENTING EKG OBTAINED  UNDERLYING EKG OBTAINED WITH A QRS   DR SCHMID IN TO SEE THIS PT AND SPOKE TO PT AND DAUGHTER/     VERBAL ORDER TO START ELIQUIS 5 MG BID AND SCHEDULE PT FOR A CARDIOVERISON IN 4 WEEKS . ORDERS  PRINTED AND TAKEN TO NAIMA IN   SCHEDULING/ NAIMA WILL CALL WITH INSTRUCTIONS    SAMPLES OF ELIQUIS GIVEN     PER DR SCHMID KEEP HER LRL AT 80 UNTIL HE TELLS US OTHERWISE D/T PT HAVING TORSADES DE POINT IN THE HOSPITAL   VERBAL ORDER TO ALSO TURN ON RATE RESPONSE      FINAL PROGRAMMING DDDR   I WILL ORDER THIS PT A MONITOR FOR HER BEDSIDE AND GAVE HER THE NUMBER TO OUR OFFICE AND [employer] IF SHE HAS ISSUES SETTING UP THE MONITOR WHEN IT ARRIVES     
Noted.  
normal...

## 2024-02-09 ENCOUNTER — NON-APPOINTMENT (OUTPATIENT)
Age: 49
End: 2024-02-09

## 2024-06-05 ENCOUNTER — APPOINTMENT (OUTPATIENT)
Dept: PULMONOLOGY | Facility: CLINIC | Age: 49
End: 2024-06-05
Payer: COMMERCIAL

## 2024-06-05 VITALS — HEART RATE: 100 BPM | DIASTOLIC BLOOD PRESSURE: 85 MMHG | OXYGEN SATURATION: 98 % | SYSTOLIC BLOOD PRESSURE: 125 MMHG

## 2024-06-05 DIAGNOSIS — U07.1 COVID-19: ICD-10-CM

## 2024-06-05 DIAGNOSIS — J45.909 UNSPECIFIED ASTHMA, UNCOMPLICATED: ICD-10-CM

## 2024-06-05 DIAGNOSIS — T17.500A UNSPECIFIED FOREIGN BODY IN BRONCHUS CAUSING ASPHYXIATION, INITIAL ENCOUNTER: ICD-10-CM

## 2024-06-05 LAB — POCT - HEMOGLOBIN (HGB), QUANTITATIVE, TRANSCUTANEOUS: 11.9

## 2024-06-05 PROCEDURE — 94727 GAS DIL/WSHOT DETER LNG VOL: CPT

## 2024-06-05 PROCEDURE — 94060 EVALUATION OF WHEEZING: CPT

## 2024-06-05 PROCEDURE — 94729 DIFFUSING CAPACITY: CPT

## 2024-06-05 PROCEDURE — 88738 HGB QUANT TRANSCUTANEOUS: CPT

## 2024-06-05 PROCEDURE — ZZZZZ: CPT

## 2024-06-05 PROCEDURE — 99214 OFFICE O/P EST MOD 30 MIN: CPT | Mod: 25

## 2024-06-05 RX ORDER — BUDESONIDE AND FORMOTEROL FUMARATE DIHYDRATE 160; 4.5 UG/1; UG/1
160-4.5 AEROSOL RESPIRATORY (INHALATION)
Qty: 1 | Refills: 3 | Status: ACTIVE | COMMUNITY
Start: 2024-06-05 | End: 1900-01-01

## 2024-06-05 RX ORDER — BUDESONIDE, GLYCOPYRROLATE, AND FORMOTEROL FUMARATE 160; 9; 4.8 UG/1; UG/1; UG/1
160-9-4.8 AEROSOL, METERED RESPIRATORY (INHALATION)
Qty: 1 | Refills: 3 | Status: DISCONTINUED | COMMUNITY
Start: 2022-02-02 | End: 2024-06-05

## 2024-06-05 NOTE — PROCEDURE
[FreeTextEntry1] : Chest x-ray PA lateral June 5, 2024 Cardiac size normal Clear lung fields   Trace pleural effusions without red nodules Soft tissue bony structures unremarkable Annalisa mediastinum unremarkable no interval change compared to chest x-rays dating back to August 2020  PFT June 5, 2024 Spirometric flow rates normal range No air-trapping no bronchodilator response Lung bands are normal Diffusion normal range 2022% predicted Hemoglobin 11.9 Overall comparison demonstrates interval improvement at the fusion and total lung capacity with stable flow rates CT scan  PFT 5/3/23  Flow  rates nl  Lung Volumes nl  DLCO 107 % pred WNL  no decline  HGB 12.9 sawtooth pattern r/o TAMMIE  Chest x-ray PA lateral May 3, 2023 Indication tachycardia chest congestion chest tightness Cardiac size is normal Lung fields are clear No parenchymal infiltrates pleural effusions dominant pulmonary nodules Soft tissue bony structures unremarkable Mediastinum unremarkable Impression clear lungs  PFT September 8, 2022 Flow rates normal 115 normal Diffusion normal 93% predicted Data comparison to March 2022 demonstrates overall stable pulmonary physiology NIOX  13  ppb WNL 9/8/22   PFT 3/16/22 Flow rates nl Lung Volume nl DLCO 98 % HGB13.0  PFT Oct 13 2021 Flow rates normal Lung volumes normal Diffusion normal  HGB 13.1 Noted mild decline at DLCO and FVC and FEV1  NIOX 11 ppb  Alphonso No BD 6/30/21 Normal flows  PFT 4/14/21 Normal flow rates Normal lung Volumes Normal DLCO 99 % pred  HGB 13.2 NIOX 12 ppb 4/ 14 /21  Chest x-ray PA lateral March 3, 2020 Normal cardiac size Clear lungs No parenchymal infiltrates pleural effusions or dominant pulmonary nodules Soft tissue bony structures unremarkable Annalisa mediastinum unremarkable No gross interval change compared to chest x-ray orbits 14 2020 Impression clear lungs  NIOX 14  ppb 9/11/20 PFT 9/11/2020 spirometry  normal flow  rates lung volumes normal normal DLCO  HGB 13.1  PFT August 14, 2020 Normal flow rates No response to bronchodilator at the FEV1 Borderline 17% BD  to bronchodilator at the small airways Normal lung volumes Diffusion normal 104% of predicted.  NIOX 13 ppb Pulmonary 6-minute walk stress test August 14, 2020 Baseline room air O2 saturation 97% with a heart rate baseline 97 there immediately increased to 115 Maximum heart rate was 130 at 5 minutes with room air O2 saturation 98% Impression sinus tachycardia Negative exertional hypoxemia or desaturation No indication for portable oxygen therapy  Chest x-ray PA lateral August 14, 2020 Normal cardiac size Lung fields are clear without parenchymal infiltrates pleural effusions or dominant pulmonary nodules Soft tissue bony structures grossly unremarkable Annalisa mediastinum grossly unremarkable Impression clear lungs  Blood draw

## 2024-06-05 NOTE — HISTORY OF PRESENT ILLNESS
[Never] : never [Daytime Somnolence] : daytime somnolence [Snoring] : snoring [Unintentional Sleep while Inactive] : unintentional sleep while inactive [TextBox_4] : s/p COVID 2024 active Chrons disease mild inc asthma sxs nocturnal with improvement with BREZTRI 2  puffs BID has an issue with cost added inhaler spacer  states cardiology about 2 years ago re inc HR ? prn use BB  45-year-old female Pulmonary evaluation Chief complaint chronic chest congestion occasional wheeze with a history of asthma Patient states is a longstanding asthma diagnosed onset childhood She states that 2018 she was admitted and treated for bilateral pneumonia [facility] In addition at that time being treated with antibiotics bronchodilators she underwent a bronchoscopy and had friable airways with copious mucoid secretions States since then she feels like a long-term asthma status has been worse She describes occasional but chronic wheeze as well as chest tightness chest congestion There is some associated shortness of breath She also admits there is some component of anxiety She is also been monitoring her heart rate with a Fitbit type watch as well as has tachycardia and is undergone cardiac evaluation and was prescribed a beta-blocker for which she has yet picked up Denies any sputum production or hemoptysis No fevers chills or sweats Her other medical history is significant for Crohn's colitis with no treatment for biologic agents No reported history of chemical toxic or inhalational exposures She states this past week or 2 she is used her rescue inhaler Ventolin well greater than 2-3 times She is on Symbicort 160 just 4.52 puffs twice daily  Seasonal allergies reported   3 para 3 menopausal  Non-smoker No alcohol use  Hospital data review 2018 CT angiogram Negative for pulmonary embolism  prior groundglass and tree-in-bud opacities resolved   Additional data review 2017 lupus anticoagulant silica clotting time positive Prior d-dimer is negative 2017 ANCA negative atypical ANCA negative Aspergillus negative Marion antibody negative Mycoplasma IgG +2017 AFB bronchoscopy negative bronchoscopy special stains negative.  Smoking Status: never  eCigarettes: never  Marijuana use: never

## 2024-06-05 NOTE — DISCUSSION/SUMMARY
[FreeTextEntry1] : Present stable asthma no active symptoms Asthma flare -  clinically improved- Z Raúl / medrol raúl- trial BREXTRI with sample  consider singulair if nec NOT Active discuss re GERD prilosec + Pepcid Chronic persistent asthma Normal pulmonary physiology with normal NIOX and 6-minute walk test History of mucoid impaction Hx prior negative Aspergillus titers Sinus tachycardia Crohn's colitis without treatment with biologic agent  component of anxiety depressive disorder  As per cardiology patient is having a beta-blocker added will need to monitor spirometric data and clinical symptoms- OFF Advair Rx- HOLD Trial BREZTRI 2 puffs BID hold Medication changes Symbicort 160-4.52 puffs twice daily and rinse Notify of any wheezing. Notify if rescue inhaler is needed greater than 2-3 times in any week. Avoid known triggers. Notify of any wheezing.  Notify if rescue inhaler is needed greater than 2-3 times in any week.  Avoid known triggers.   Pfizer vaccination completed P:us Sept booster check TFT sec inc HR  r/o TAMMIE  HSS Blood work pending Asthma panel Thyroid panel DVT panel Office follow-up 1 month following the study

## 2024-08-23 NOTE — ED ADULT NURSE NOTE - PAIN: BODY LOCATION
ATIENT INSTRUCTED TO BE:    - NOTHING TO EAT AFTER MIDNIGHT OR CHEW, EXCEPT CAN HAVE CLEAR LIQUIDS 2 HOURS PRIOR TO SURGERY ARRIVAL TIME , NO MORE THAN 8 OZ. (NOTHING RED)     - TO HOLD ALL VITAMINS, SUPPLEMENTS, NSAIDS FOR ONE WEEK PRIOR TO THEIR SURGICAL PROCEDURE    - DO NOT TAKE __________NA____________ 7 DAYS PRIOR TO PROCEDURE PER ANESTHESIA RECOMMENDATIONS/INSTRUCTIONS     - INSTRUCTED PT TO USE SURGICAL SOAP 1 TIME THE NIGHT PRIOR TO SURGERY ___________ OR THE AM OF SURGERY _____________   USE THE SOAP FROM NECK TO TOES, AVOID THEIR FACE, HAIR, AND PRIVATE PARTS. IF USE THE SOAP THE NIGHT PRIOR TO SURGERY, CHANGE BED LINENS AND NO PETS IN THE BED.     INSTRUCTED NO LOTIONS, JEWELRY, PIERCINGS,  NAIL POLISH, OR DEODORANT DAY OF SURGERY    - IF DIABETIC, CHECK BLOOD GLUCOSE IF LESS THAN 70 OR HAVING SYMPTOMS CALL THE PREOP AREA FOR INSTRUCTIONS ON AM OF SURGERY ([phone] )    -INSTRUCTED TO TAKE THE FOLLOWING MEDICATIONS THE DAY OF SURGERY WITH SIPS OF WATER: ALBUTEROL INHALER AS NEEDED, TRILLEGY INHALER, RANOLAZINE          - DO NOT BRING ANY MEDICATIONS WITH YOU TO THE HOSPITAL THE DAY OF SURGERY, EXCEPT IF USE INHALERS. BRING INHALERS DAY OF SURGERY       - BRING CPAP OR BIPAP TO THE HOSPITAL ONLY IF YOU ARE SPENDING THE NIGHT    - DO NOT SMOKE OR VAPE 24 HOURS PRIOR TO PROCEDURE PER ANESTHESIA REQUEST     -MAKE SURE YOU HAVE A RIDE HOME OR SOMEONE TO STAY WITH YOU THE DAY OF THE PROCEDURE AFTER YOU GO HOME     - FOLLOW ANY OTHER INSTRUCTIONS GIVEN TO YOU BY YOUR SURGEON'S OFFICE.     - DAY OF SURGERY ____________, COME TO ELEVATOR A, THIRD FLOOR, CHECK IN AT THE DESK FOR REGISTRATION/SURGERY     - YOU WILL RECEIVE A PHONE CALL THE DAY PRIOR TO SURGERY BETWEEN 1PM AND 4 PM WITH ARRIVAL TIME, IF YOUR SURGERY IS ON A MONDAY YOU WILL RECEIVE A CALL THE FRIDAY PRIOR TO SURGERY DATE    - BRING CASH OR CREDIT CARD FOR COPAYMENT OF MEDICATIONS AFTER SURGERY IF YOU USE THE HOSPITAL PHARMACY (MEDS TO BED)  NA  -  PREADMISSION TESTING NURSE LAURA BRUCE [phone] IF HAVE ANY QUESTIONS     -PATIENT PROVIDED THE NUMBER FOR PREOP SURGICAL DEPT IF HAD QUESTIONS AFTER HOURS PRIOR TO SURGERY ([phone] ).  INFORMED PT IF NO ANSWER, LEAVE A MESSAGE AND SOMEONE WILL RETURN THEIR CALL       PATIENT VERBALIZED UNDERSTANDING      chest, substernal

## 2024-09-10 ENCOUNTER — APPOINTMENT (OUTPATIENT)
Dept: NEUROLOGY | Facility: CLINIC | Age: 49
End: 2024-09-10
Payer: COMMERCIAL

## 2024-09-10 VITALS
SYSTOLIC BLOOD PRESSURE: 131 MMHG | BODY MASS INDEX: 32.14 KG/M2 | HEIGHT: 66 IN | WEIGHT: 200 LBS | DIASTOLIC BLOOD PRESSURE: 83 MMHG | HEART RATE: 121 BPM

## 2024-09-10 DIAGNOSIS — R51.9 HEADACHE, UNSPECIFIED: ICD-10-CM

## 2024-09-10 PROCEDURE — 99213 OFFICE O/P EST LOW 20 MIN: CPT

## 2024-09-10 NOTE — ASSESSMENT
[FreeTextEntry1] : Mrs. Melgar is a 49-year-old with chronic daily headache syndrome which likely is a consequence of progressive common migraine and rebound due to over-the-counter analgesic use.  She has been hesitant to take medications suggested for migraine prophylaxis.  I suggested evaluation by one of our specialized headache specialists.  She might be a candidate for Botox.  Further management will depend upon that consultation and her clinical course.

## 2024-09-10 NOTE — HISTORY OF PRESENT ILLNESS
[FreeTextEntry1] : Mrs. Argenis Meglar returned to the office having been initially evaluated on December 26, 2023.  She is a 49-year-old right-handed patient who was  with 3 children aged 17, 18 and 21.  The following was her history at her initial visit: She works for [employer].  She has a history of depression for which she is currently on duloxetine 60 mg daily, buspirone and bupropion.  She complains of episodic headaches in her youth.  Since age 32, she has been experiencing more frequent headaches.  These are typically dull and aching but occasionally throbbing and involve her forehead, occiput and neck.  On occasion, they are accompanied by nausea.  There are no visual auras or environmental or food triggers.  Her headaches have become more frequent over the past year.  They occur at least 4 times a week.  She has been taking acetaminophen 4 or 5 times weekly.  She was evaluated by neurologist in the past.  She underwent MRI of the brain in 2019 which was unremarkable.  A cervical MRI performed in 2022 revealed straightening of the normal cervical lordosis but no other abnormalities.  Another MRI of the brain apparently revealed an arachnoid cyst.  She was prescribed medications but never took them.  She also complains of brain fog.  On 1 occasion, she had difficulty walking up a flight of stairs and another occasion problems speaking.  She has mild short-term memory loss.  She has a tremor in her hands just like her father.  Clinical impression was that of probable common migraine which had evolved into a chronic daily headache syndrome possibly with an element of rebound.  She also complains of brain fog and possibly a familial tremor.  She underwent an MRI of the brain which revealed a left cerebellar arachnoid cyst which was likely incidental.  A serologic evaluation was unremarkable.  I suggested treatment with either an oral or injectable calcitonin gene receptor antagonist.  Neither has been accomplished.  There was an element of cost as well as fear of needles.  Mrs. Melgar complains of headaches which occur about 3 times a week and can be prolonged.  She describes frontal and occipital pressure with occasional photophobia.  She takes acetaminophen daily.  She denies any triggers.  Her headaches had eased a bit between December and August but recently worsened.  Past surgical history is notable for removal of a benign mass from her foot.  She suffers from Crohn's disease since age 15 which has been in remission.  She was treated in the past with prednisone, Asacol and mercaptopurine.  She suffers from hyperlipidemia, asthma, GERD and headaches.  There is no history of hypertension, diabetes, coronary, arrhythmia, renal, hepatic, thyroid, hematologic or cerebrovascular disease.  She has an intolerance to Demerol.  Medications include duloxetine, buspirone, bupropion, omeprazole and rosuvastatin.  She is a non-smoker and nondrinker.  She is .  Family history is notable for a mother with seizures, depression and thyroid cancer.  Her father suffers from dementia and Crohn's disease.  Her brother suffers from thyroid cancer.

## 2024-09-10 NOTE — CONSULT LETTER
[Dear  ___] : Dear  [unfilled], [Consult Letter:] : I had the pleasure of evaluating your patient, [unfilled]. [Please see my note below.] : Please see my note below. [Consult Closing:] : Thank you very much for allowing me to participate in the care of this patient.  If you have any questions, please do not hesitate to contact me. [Sincerely,] : Sincerely, [FreeTextEntry3] : Jaden Davey M.D.

## 2024-09-10 NOTE — PHYSICAL EXAM
[FreeTextEntry1] : Constitutional:  Patient was well-developed, well-nourished and in no acute distress.   Head:  Normocephalic, atraumatic. Tympanic membranes were clear.   Neck:  Supple with full range of motion.   Cardiovascular:  Cardiac rhythm was regular without murmur. There were no carotid bruits. Peripheral pulses were full and symmetric.   Respiratory:  Lungs were clear.   Abdomen:  Soft and nontender.   Spine:  Nontender.   Skin:  There were no rashes.   NEUROLOGICAL EXAMINATION:  Mental Status: Patient was alert and oriented. Speech was fluent. There was no dysarthria.   Cranial Nerves:   II: V   She could finger count bilaterally. Pupils were equal and reactive. Visual fields were full. Funduscopic examination was normal.   III, IV, VI:  Eye movements were full without nystagmus.   V: Facial sensation was intact.   VII: Facial strength was normal.   VIII: Hearing was equal.   IX, X: Palatal movement was normal. Phonation was normal.   XI: Sternocleidomastoids and trapezii were normal.   XII: Tongue was midline and movements normal. There was no lingual atrophy or fasciculations.   Motor Examination: Muscle bulk, tone and strength were normal.   Sensory Examination: Pinprick, vibration and joint position sense were intact.   Reflexes: DTRs were 2+ throughout except for the ankles where there were a few beats of clonus.   Plantar Responses: Plantar responses were flexor.   Coordination/Cerebellar Function: There was no dysmetria on finger to nose or heel to shin testing.   Gait/Stance: Gait and tandem were normal. Romberg was negative.

## 2024-10-02 ENCOUNTER — APPOINTMENT (OUTPATIENT)
Dept: NEUROLOGY | Facility: CLINIC | Age: 49
End: 2024-10-02
Payer: COMMERCIAL

## 2024-10-02 VITALS
DIASTOLIC BLOOD PRESSURE: 71 MMHG | BODY MASS INDEX: 32.14 KG/M2 | HEART RATE: 90 BPM | WEIGHT: 200 LBS | SYSTOLIC BLOOD PRESSURE: 126 MMHG | HEIGHT: 66 IN

## 2024-10-02 DIAGNOSIS — M26.629 ARTHRALGIA OF TEMPOROMANDIBULAR JOINT,: ICD-10-CM

## 2024-10-02 DIAGNOSIS — R51.9 HEADACHE, UNSPECIFIED: ICD-10-CM

## 2024-10-02 DIAGNOSIS — Z63.6 DEPENDENT RELATIVE NEEDING CARE AT HOME: ICD-10-CM

## 2024-10-02 DIAGNOSIS — G43.709 CHRONIC MIGRAINE W/OUT AURA, NOT INTRACTABLE, W/OUT STATUS MIGRAINOSUS: ICD-10-CM

## 2024-10-02 PROCEDURE — 99205 OFFICE O/P NEW HI 60 MIN: CPT

## 2024-10-02 SDOH — SOCIAL STABILITY - SOCIAL INSECURITY: DEPENDENT RELATIVE NEEDING CARE AT HOME: Z63.6

## 2024-10-02 NOTE — HISTORY OF PRESENT ILLNESS
[FreeTextEntry1] : This is a case of a 49 yr right  handed female with PMH of depression, asthma, TAMMIE, crohns, HLD, tachycardic presents today with headaches.  Pt has had a history of headaches since her youth. she reports them starting in her mid 20's after children.   She saw Dr Jiménez (neuro) for HA.  Does not remember any medication in the past. Had imaging completed. Since 32 has been experiencing more frequent headaches.  In the past yr it has increased.  Headaches can occur 2-3 times a week.  Headaches can last all day.   Located neck/frontal/temples.  Described as aching and pressure.  Pt denies n/v.  Pt did report blurred vision saw olpht- has new prescription for sight.  Denies auras or visual loss.   Pt does have photophobia denies phonophobia.  Denies extremity weakness or slurred speech.  denies numbness or tingling.   Pt does TMJ which has been chronic and uses a  for.  Treating with Tylenol with minimally.  Treats about 3 -4 times a week.  Triggers:   father  recently, stress, and taking care of her mother with Dementia  MRI completed in Dec 23 IMPRESSION: NO EVIDENCE OF INTRACRANIAL HEMORRHAGE, ACUTE TERRITORIAL INFARCT OR AREA OF ABNORMAL ENHANCEMENT. ARACHNOID CYST ALONG THE LEFT CEREBELLUM.  Pt  with 3 children   Occupation: works for [employer]: bad sleep habits- broken  Family history:  Allergies: Demerol

## 2024-10-02 NOTE — ASSESSMENT
[FreeTextEntry1] : This is a case of a 49 yr right  handed female with PMH of depression, asthma, TAMMIE, crohns, HLD, tachycardic presents today with chronic migraines  Symptoms include: photophobia, blurred, neck pain. Educated on headache stressors such as sleep hygiene and stress. Offered referrals for support (father recently passed and taking care of mother)- pt declined at this time Pt on currently on duloxetine 60 mg daily, buspirone and bupropion- will avoid other antidepressants. Pt also asthmatic- will avoid beta blockers.  Pt also history of tachycardia so will avoid triptans.  Trial emgality Trial Ubrelvy Consider botox  Plan:    {     } start emgality 240 mg sq monthly - then 120 mg sq after   {     } trial Ubrelvy 100 mg PRN   {     } Follow up for injections   {     } F/u with Dr larsen as needed  Headache education provided: [] Stay well hydrated [] Limit excessive caffeine and alcohol intake [] Maintain good sleep hygiene [] Try to avoid triggers [] Practice good eating habits [] Avoid excessive use of over the counter pain medications, as they can cause medication overuse headaches  [] Keep a headache diary [] Relaxation techniques, biofeedback, massage therapy, acupunctures, and heating pads may be effective  The above plan was discussed with Argenis Melgar in great detail. Argenis Melgar verbalized understanding and agrees with plan as detailed above. Patient was provided education and counselling on current diagnosis/symptoms. She was advised to call our clinic at [phone] for any new or worsening symptoms, or with any questions or concerns. In case of acute onset of neurological symptoms or worsening presentation, patient was advised to present to nearest emergency room for further evaluation. Argenis Melgar expressed understanding and all her questions/concerns were addressed.

## 2024-10-02 NOTE — PHYSICAL EXAM
[General Appearance - Alert] : alert [General Appearance - Well Nourished] : well nourished [General Appearance - Well-Appearing] : healthy appearing [Oriented To Time, Place, And Person] : oriented to person, place, and time [Person] : oriented to person [Place] : oriented to place [Time] : oriented to time [Cranial Nerves Oculomotor (III)] : extraocular motion intact [Cranial Nerves Facial (VII)] : face symmetrical [Cranial Nerves Accessory (XI - Cranial And Spinal)] : head turning and shoulder shrug symmetric [Motor Tone] : muscle tone was normal in all four extremities [Motor Strength] : muscle strength was normal in all four extremities [Balance] : balance was intact [2+] : Patella left 2+ [Sclera] : the sclera and conjunctiva were normal [Neck Appearance] : the appearance of the neck was normal [] : no respiratory distress [Abnormal Walk] : normal gait [Skin Color & Pigmentation] : normal skin color and pigmentation

## 2024-10-04 RX ORDER — UBROGEPANT 100 MG/1
100 TABLET ORAL
Qty: 14 | Refills: 5 | Status: ACTIVE | COMMUNITY
Start: 2024-10-02 | End: 1900-01-01

## 2024-10-04 RX ORDER — GALCANEZUMAB 120 MG/ML
120 INJECTION, SOLUTION SUBCUTANEOUS
Qty: 2 | Refills: 0 | Status: ACTIVE | COMMUNITY
Start: 2024-10-02 | End: 1900-01-01

## 2024-10-04 RX ORDER — GALCANEZUMAB 120 MG/ML
120 INJECTION, SOLUTION SUBCUTANEOUS
Qty: 3 | Refills: 3 | Status: ACTIVE | COMMUNITY
Start: 2024-10-02 | End: 1900-01-01

## 2024-10-16 NOTE — PROGRESS NOTE ADULT - PROBLEM SELECTOR PLAN 1
10/16/24 0635   Hygiene   Hygiene Gown changed   Level of Assistance Moderate assist   Skin Care Chlorhexidine wipes      -Dr. Voss consulted, appreciate recs  -Broncoscopy today  -Proventil and Symbicort prn  -Geovanna and Aris arnold prn for cough  -Hold steroids and antibiotic as per pulmonary  -IVF  -Storey spray  -F/u aspergillus Ab, certromere Ab, allergy panel, lupus profile, thyroid studies  -F/u blood and urine culture.    bronchiolitis on ct of chest as noted  no medical contraindications to bronchoscopy

## 2024-10-21 ENCOUNTER — APPOINTMENT (OUTPATIENT)
Dept: NEUROLOGY | Facility: CLINIC | Age: 49
End: 2024-10-21
Payer: COMMERCIAL

## 2024-10-21 VITALS
HEIGHT: 66 IN | WEIGHT: 200 LBS | SYSTOLIC BLOOD PRESSURE: 128 MMHG | HEART RATE: 105 BPM | DIASTOLIC BLOOD PRESSURE: 86 MMHG | BODY MASS INDEX: 32.14 KG/M2

## 2024-10-21 DIAGNOSIS — G43.709 CHRONIC MIGRAINE W/OUT AURA, NOT INTRACTABLE, W/OUT STATUS MIGRAINOSUS: ICD-10-CM

## 2024-10-21 DIAGNOSIS — R51.9 HEADACHE, UNSPECIFIED: ICD-10-CM

## 2024-10-21 DIAGNOSIS — R41.89 OTHER SYMPTOMS AND SIGNS INVOLVING COGNITIVE FUNCTIONS AND AWARENESS: ICD-10-CM

## 2024-10-21 PROCEDURE — 99214 OFFICE O/P EST MOD 30 MIN: CPT | Mod: 25

## 2024-10-21 PROCEDURE — 96372 THER/PROPH/DIAG INJ SC/IM: CPT

## 2024-11-07 NOTE — ED ADULT NURSE NOTE - CAS EDN INTEG ASSESS
[facility] GENERAL PATIENT MESSAGE    Caller name: Melisa    Contact number: [phone]    Relationship to patient: adult child    On patient contact list? Yes    Provider name:Dr. Bernardo Hernández    Reason for call: Patient's daughter Melisa called to schedule the patient for a follow up appointment, (was not an emergency contact but I added her as one with the permission of the patient). Melisa initially stated he was seen inpatient and outpatient and that she needed to have medication questions answered. I took a look at his chart and he has never been seen by  in office so he would have to be considered as a new patient. The soonest new patient slot in [place] is in 4/2025, Melisa stated she was not willing to go to [place]. Melisa asked what she can do about the medications and I let her know  cannot prescribe unless he is seen in our office first (per office policy). I offered again to get them scheduled and added to wait list, Melisa stated April was too far out and that she would call us back. Please advise.     Is this an urgent message (e.g., does caller require a response today?): No - use script below    Turnaround time given to caller:   \"This message will be sent to [state provider's full name]. The clinical team will return your call as soon as they review your message. Urgent messages will be returned by the end of the day. Non-urgent messages will be returned within 2 business days.\"           WDL

## 2024-12-14 ENCOUNTER — EMERGENCY (EMERGENCY)
Facility: HOSPITAL | Age: 49
LOS: 1 days | Discharge: HOME | End: 2024-12-14
Attending: STUDENT IN AN ORGANIZED HEALTH CARE EDUCATION/TRAINING PROGRAM | Admitting: STUDENT IN AN ORGANIZED HEALTH CARE EDUCATION/TRAINING PROGRAM
Payer: COMMERCIAL

## 2024-12-14 VITALS
SYSTOLIC BLOOD PRESSURE: 138 MMHG | HEART RATE: 98 BPM | WEIGHT: 192.9 LBS | OXYGEN SATURATION: 100 % | TEMPERATURE: 98 F | HEIGHT: 66 IN | DIASTOLIC BLOOD PRESSURE: 95 MMHG | RESPIRATION RATE: 20 BRPM

## 2024-12-14 VITALS
DIASTOLIC BLOOD PRESSURE: 87 MMHG | OXYGEN SATURATION: 100 % | RESPIRATION RATE: 16 BRPM | TEMPERATURE: 98 F | SYSTOLIC BLOOD PRESSURE: 131 MMHG | HEART RATE: 80 BPM

## 2024-12-14 DIAGNOSIS — Z98.890 OTHER SPECIFIED POSTPROCEDURAL STATES: Chronic | ICD-10-CM

## 2024-12-14 LAB
ALBUMIN SERPL ELPH-MCNC: 3.9 G/DL — SIGNIFICANT CHANGE UP (ref 3.3–5)
ALP SERPL-CCNC: 84 U/L — SIGNIFICANT CHANGE UP (ref 40–120)
ALT FLD-CCNC: 25 U/L — SIGNIFICANT CHANGE UP (ref 12–78)
ANION GAP SERPL CALC-SCNC: 4 MMOL/L — LOW (ref 5–17)
AST SERPL-CCNC: 16 U/L — SIGNIFICANT CHANGE UP (ref 15–37)
BASOPHILS # BLD AUTO: 0.06 K/UL — SIGNIFICANT CHANGE UP (ref 0–0.2)
BASOPHILS NFR BLD AUTO: 0.6 % — SIGNIFICANT CHANGE UP (ref 0–2)
BILIRUB SERPL-MCNC: 0.2 MG/DL — SIGNIFICANT CHANGE UP (ref 0.2–1.2)
BUN SERPL-MCNC: 11 MG/DL — SIGNIFICANT CHANGE UP (ref 7–23)
CALCIUM SERPL-MCNC: 10.1 MG/DL — SIGNIFICANT CHANGE UP (ref 8.5–10.1)
CHLORIDE SERPL-SCNC: 108 MMOL/L — SIGNIFICANT CHANGE UP (ref 96–108)
CO2 SERPL-SCNC: 30 MMOL/L — SIGNIFICANT CHANGE UP (ref 22–31)
CREAT SERPL-MCNC: 1.1 MG/DL — SIGNIFICANT CHANGE UP (ref 0.5–1.3)
EGFR: 62 ML/MIN/1.73M2 — SIGNIFICANT CHANGE UP
EOSINOPHIL # BLD AUTO: 0.18 K/UL — SIGNIFICANT CHANGE UP (ref 0–0.5)
EOSINOPHIL NFR BLD AUTO: 1.8 % — SIGNIFICANT CHANGE UP (ref 0–6)
GLUCOSE SERPL-MCNC: 98 MG/DL — SIGNIFICANT CHANGE UP (ref 70–99)
HCG SERPL-ACNC: <1 MIU/ML — SIGNIFICANT CHANGE UP
HCT VFR BLD CALC: 39.5 % — SIGNIFICANT CHANGE UP (ref 34.5–45)
HGB BLD-MCNC: 13.5 G/DL — SIGNIFICANT CHANGE UP (ref 11.5–15.5)
IMM GRANULOCYTES NFR BLD AUTO: 0.3 % — SIGNIFICANT CHANGE UP (ref 0–0.9)
LIDOCAIN IGE QN: 46 U/L — SIGNIFICANT CHANGE UP (ref 13–75)
LYMPHOCYTES # BLD AUTO: 3.77 K/UL — HIGH (ref 1–3.3)
LYMPHOCYTES # BLD AUTO: 36.7 % — SIGNIFICANT CHANGE UP (ref 13–44)
MAGNESIUM SERPL-MCNC: 2.3 MG/DL — SIGNIFICANT CHANGE UP (ref 1.6–2.6)
MCHC RBC-ENTMCNC: 30.5 PG — SIGNIFICANT CHANGE UP (ref 27–34)
MCHC RBC-ENTMCNC: 34.2 G/DL — SIGNIFICANT CHANGE UP (ref 32–36)
MCV RBC AUTO: 89.2 FL — SIGNIFICANT CHANGE UP (ref 80–100)
MONOCYTES # BLD AUTO: 0.87 K/UL — SIGNIFICANT CHANGE UP (ref 0–0.9)
MONOCYTES NFR BLD AUTO: 8.5 % — SIGNIFICANT CHANGE UP (ref 2–14)
NEUTROPHILS # BLD AUTO: 5.35 K/UL — SIGNIFICANT CHANGE UP (ref 1.8–7.4)
NEUTROPHILS NFR BLD AUTO: 52.1 % — SIGNIFICANT CHANGE UP (ref 43–77)
NRBC # BLD: 0 /100 WBCS — SIGNIFICANT CHANGE UP (ref 0–0)
PLATELET # BLD AUTO: 345 K/UL — SIGNIFICANT CHANGE UP (ref 150–400)
POTASSIUM SERPL-MCNC: 4.4 MMOL/L — SIGNIFICANT CHANGE UP (ref 3.5–5.3)
POTASSIUM SERPL-SCNC: 4.4 MMOL/L — SIGNIFICANT CHANGE UP (ref 3.5–5.3)
PROT SERPL-MCNC: 7.3 G/DL — SIGNIFICANT CHANGE UP (ref 6–8.3)
RBC # BLD: 4.43 M/UL — SIGNIFICANT CHANGE UP (ref 3.8–5.2)
RBC # FLD: 13.4 % — SIGNIFICANT CHANGE UP (ref 10.3–14.5)
SODIUM SERPL-SCNC: 142 MMOL/L — SIGNIFICANT CHANGE UP (ref 135–145)
TROPONIN I, HIGH SENSITIVITY RESULT: 3.4 NG/L — SIGNIFICANT CHANGE UP
TROPONIN I, HIGH SENSITIVITY RESULT: 3.5 NG/L — SIGNIFICANT CHANGE UP
WBC # BLD: 10.26 K/UL — SIGNIFICANT CHANGE UP (ref 3.8–10.5)
WBC # FLD AUTO: 10.26 K/UL — SIGNIFICANT CHANGE UP (ref 3.8–10.5)

## 2024-12-14 PROCEDURE — 71046 X-RAY EXAM CHEST 2 VIEWS: CPT | Mod: 26

## 2024-12-14 PROCEDURE — 84702 CHORIONIC GONADOTROPIN TEST: CPT

## 2024-12-14 PROCEDURE — 96375 TX/PRO/DX INJ NEW DRUG ADDON: CPT

## 2024-12-14 PROCEDURE — 96374 THER/PROPH/DIAG INJ IV PUSH: CPT

## 2024-12-14 PROCEDURE — 83690 ASSAY OF LIPASE: CPT

## 2024-12-14 PROCEDURE — 85025 COMPLETE CBC W/AUTO DIFF WBC: CPT

## 2024-12-14 PROCEDURE — 84484 ASSAY OF TROPONIN QUANT: CPT

## 2024-12-14 PROCEDURE — 83735 ASSAY OF MAGNESIUM: CPT

## 2024-12-14 PROCEDURE — 99285 EMERGENCY DEPT VISIT HI MDM: CPT

## 2024-12-14 PROCEDURE — 36415 COLL VENOUS BLD VENIPUNCTURE: CPT

## 2024-12-14 PROCEDURE — 93005 ELECTROCARDIOGRAM TRACING: CPT

## 2024-12-14 PROCEDURE — 71046 X-RAY EXAM CHEST 2 VIEWS: CPT

## 2024-12-14 PROCEDURE — 99285 EMERGENCY DEPT VISIT HI MDM: CPT | Mod: 25

## 2024-12-14 PROCEDURE — 93010 ELECTROCARDIOGRAM REPORT: CPT

## 2024-12-14 PROCEDURE — 80053 COMPREHEN METABOLIC PANEL: CPT

## 2024-12-14 RX ORDER — MAGNESIUM, ALUMINUM HYDROXIDE 200-225/5
30 SUSPENSION, ORAL (FINAL DOSE FORM) ORAL ONCE
Refills: 0 | Status: COMPLETED | OUTPATIENT
Start: 2024-12-14 | End: 2024-12-14

## 2024-12-14 RX ORDER — SODIUM CHLORIDE 9 MG/ML
1000 INJECTION, SOLUTION INTRAMUSCULAR; INTRAVENOUS; SUBCUTANEOUS ONCE
Refills: 0 | Status: COMPLETED | OUTPATIENT
Start: 2024-12-14 | End: 2024-12-14

## 2024-12-14 RX ORDER — FAMOTIDINE 20 MG/1
20 TABLET, FILM COATED ORAL ONCE
Refills: 0 | Status: COMPLETED | OUTPATIENT
Start: 2024-12-14 | End: 2024-12-14

## 2024-12-14 RX ORDER — LORAZEPAM 2 MG/1
1 TABLET ORAL ONCE
Refills: 0 | Status: DISCONTINUED | OUTPATIENT
Start: 2024-12-14 | End: 2024-12-14

## 2024-12-14 RX ORDER — LIDOCAINE 40 MG/G
5 CREAM TOPICAL ONCE
Refills: 0 | Status: COMPLETED | OUTPATIENT
Start: 2024-12-14 | End: 2024-12-14

## 2024-12-14 RX ADMIN — Medication 162 MILLIGRAM(S): at 01:27

## 2024-12-14 RX ADMIN — SODIUM CHLORIDE 2000 MILLILITER(S): 9 INJECTION, SOLUTION INTRAMUSCULAR; INTRAVENOUS; SUBCUTANEOUS at 01:26

## 2024-12-14 RX ADMIN — FAMOTIDINE 20 MILLIGRAM(S): 20 TABLET, FILM COATED ORAL at 01:28

## 2024-12-14 RX ADMIN — Medication 30 MILLILITER(S): at 01:30

## 2024-12-14 RX ADMIN — LORAZEPAM 1 MILLIGRAM(S): 2 TABLET ORAL at 01:26

## 2024-12-14 NOTE — ED ADULT TRIAGE NOTE - CHIEF COMPLAINT QUOTE
pt. came in from home, ambulatory w/ c/o left chest started yesterday and got worst today, known w/ asthma and anxiety, pt. send to T2 for EKG.

## 2024-12-14 NOTE — ED PROVIDER NOTE - PROGRESS NOTE DETAILS
Patient with improvement in symptoms here occasion.  First troponin negative.  Will plan for repeat troponin and if stable, likely discharge given her low risk heart score.  Already discussed the importance of outpatient cardiology follow-up which she is understanding of.  Discussed with patient's  at bedside as well.  Duarte Haines MD. Richard Vera MD  rpt michael mohr. explained results of w/u to pt and all quests answered. will dc with pcp/cards f/u; strict return precautions given

## 2024-12-14 NOTE — ED PROVIDER NOTE - OBJECTIVE STATEMENT
Patient with a past medical history of anxiety, coronary disease, asthma and hyperlipidemia is presenting with complaints of chest pain.  States her symptoms started yesterday, and worsened this evening.  States that she thought she was feeling anxious which has happened to her before but her symptoms did not improve after taking a muscle relaxer so came to ED for evaluation.  Denies any associated shortness of breath.  No fever.  States that she also has been feeling gastritis and acid reflux and feels this may be contributing to her symptoms.  Reports seeing a cardiologist in the past, Dr. Garay.  Symptoms are not worse with exertion.  She does not have any history of PE or DVT, no recent travel or history of malignancy.  No leg swelling or calf pain.

## 2024-12-14 NOTE — ED PROVIDER NOTE - CARE PROVIDERS DIRECT ADDRESSES
[email or web address]

## 2024-12-14 NOTE — ED PROVIDER NOTE - PHYSICAL EXAMINATION
Constitutional: Awake, Alert, appears anxious  HEAD: Normocephalic, atraumatic.   EYES: PERRL, EOM intact, conjunctiva and sclera are clear bilaterally.  ENT: External ears normal. No rhinorrhea, no tracheal deviation   NECK: Supple, non-tender  CARDIOVASCULAR: regular rate and rhythm.  RESPIRATORY: Normal respiratory effort; breath sounds CTAB, no wheezes, rhonchi, or rales. Speaking in full sentences. No accessory muscle use.   ABDOMEN: Soft; non-tender, non-distended. No rebound or guarding.   MSK:  no lower extremity edema, no deformities, no calf pain  SKIN: Warm, dry  NEURO: A&O x3. Sensory and motor functions are grossly intact. Speech is normal. No facial droop.  PSYCH: Appears anxious

## 2024-12-14 NOTE — ED ADULT NURSE NOTE - CAS TRG GENERAL NORM CIRC DET
The membrane is not visually significant. Strong peripheral pulses/Capillary refill less/equal to 2 seconds

## 2024-12-14 NOTE — ED PROVIDER NOTE - CLINICAL SUMMARY MEDICAL DECISION MAKING FREE TEXT BOX
Patient with left-sided chest pain that goes to her jaw at times, nonexertional makes ACS less likely.  However with her age and history of hyperlipidemia, will evaluate for ACS.  ECG per my independent interpretation shows a rate of 91, normal sinus rhythm, normal axis, no ST segment elevation consistent with ischemia though there is a wavy baseline in lead I and lead II.  Before troponin, patient with low risk heart score.  Patient is PERC negative, do not suspect PE.  Possible component of gastritis versus anxiety however will evaluate for ACS as well.  Plan on lab work, x-ray, medications and reassessment

## 2024-12-14 NOTE — ED ADULT NURSE REASSESSMENT NOTE - NS ED NURSE REASSESS COMMENT FT1
Pt feeling better at this time, second troponin negative.  Pt to dc home and follow up out pt with cardio.  Pt and S/O expressed understanding of dc instructions.

## 2024-12-14 NOTE — ED ADULT NURSE NOTE - BEFAST EYES
Patient in on nurse schedule for ear wax removal.  Patient saw Dr BENJAMIN Hemphill 3/4/19 and he instructed patient to return for ear wash.  Patient stated he did the prep with sweet oil.  small returns left ear.   large returns right ear.  Patient tolerated well. Patient states no further questions at this time.  
No

## 2024-12-14 NOTE — ED PROVIDER NOTE - CARE PROVIDER_API CALL
Jw Garay  Cardiovascular Disease  [address] [place] [id number]  Phone: [phone]  Fax: [phone]  Follow Up Time: 4-6 Days   Jw Garay  Cardiovascular Disease  [address] [place] [id number]  Phone: [phone]  Fax: [phone]  Follow Up Time: 4-6 Days    Manoj Baig  Cardiology  [address] [place] [address]  Phone: [phone]  Fax: [phone]  Follow Up Time: 4-6 Days

## 2024-12-14 NOTE — ED PROVIDER NOTE - PROVIDER TOKENS
PROVIDER:[TOKEN:[[id number]],FOLLOWUP:[4-6 Days]] PROVIDER:[TOKEN:[[id number]],FOLLOWUP:[4-6 Days]],PROVIDER:[TOKEN:[[id number]],FOLLOWUP:[4-6 Days]]

## 2024-12-14 NOTE — ED ADULT NURSE NOTE - NSFALLUNIVINTERV_ED_ALL_ED
Bed/Stretcher in lowest position, wheels locked, appropriate side rails in place/Call bell, personal items and telephone in reach/Instruct patient to call for assistance before getting out of bed/chair/stretcher/Non-slip footwear applied when patient is off stretcher/[place] to call system/Physically safe environment - no spills, clutter or unnecessary equipment/Purposeful proactive rounding/Room/bathroom lighting operational, light cord in reach

## 2024-12-14 NOTE — ED PROVIDER NOTE - PATIENT PORTAL LINK FT
You can access the FollowMyHealth Patient Portal offered by [facility] by registering at the following website: [email or web address]. By joining [employer]’s FollowMyHealth portal, you will also be able to view your health information using other applications (apps) compatible with our system.

## 2024-12-14 NOTE — ED ADULT NURSE NOTE - OBJECTIVE STATEMENT
Pt presented to ED c/o left sided chest pain radiating to left jaw and left arm worsening since yesterday.  h/o asthma and anxiety.  EKG completed- NSR.  Denies SOB/resp. distress.  Some nausea earlier- resolved.  No vomiting or diarrhea.  S/O at bedside.  Maintain comfort.

## 2025-01-08 ENCOUNTER — APPOINTMENT (OUTPATIENT)
Dept: PULMONOLOGY | Facility: CLINIC | Age: 50
End: 2025-01-08
Payer: COMMERCIAL

## 2025-01-08 VITALS — HEART RATE: 100 BPM | SYSTOLIC BLOOD PRESSURE: 118 MMHG | DIASTOLIC BLOOD PRESSURE: 78 MMHG | OXYGEN SATURATION: 97 %

## 2025-01-08 DIAGNOSIS — Z23 ENCOUNTER FOR IMMUNIZATION: ICD-10-CM

## 2025-01-08 DIAGNOSIS — J45.909 UNSPECIFIED ASTHMA, UNCOMPLICATED: ICD-10-CM

## 2025-01-08 PROCEDURE — 94060 EVALUATION OF WHEEZING: CPT

## 2025-01-08 PROCEDURE — 90677 PCV20 VACCINE IM: CPT

## 2025-01-08 PROCEDURE — 90471 IMMUNIZATION ADMIN: CPT

## 2025-01-08 PROCEDURE — 99214 OFFICE O/P EST MOD 30 MIN: CPT | Mod: 25

## 2025-01-13 ENCOUNTER — APPOINTMENT (OUTPATIENT)
Dept: NEUROLOGY | Facility: CLINIC | Age: 50
End: 2025-01-13
Payer: COMMERCIAL

## 2025-01-13 ENCOUNTER — NON-APPOINTMENT (OUTPATIENT)
Age: 50
End: 2025-01-13

## 2025-01-13 VITALS
HEIGHT: 66 IN | BODY MASS INDEX: 31.34 KG/M2 | WEIGHT: 195 LBS | DIASTOLIC BLOOD PRESSURE: 90 MMHG | HEART RATE: 98 BPM | SYSTOLIC BLOOD PRESSURE: 141 MMHG

## 2025-01-13 DIAGNOSIS — R51.9 HEADACHE, UNSPECIFIED: ICD-10-CM

## 2025-01-13 PROCEDURE — 99212 OFFICE O/P EST SF 10 MIN: CPT

## 2025-03-06 ENCOUNTER — APPOINTMENT (OUTPATIENT)
Dept: BREAST CENTER | Facility: CLINIC | Age: 50
End: 2025-03-06
Payer: COMMERCIAL

## 2025-03-06 VITALS
HEART RATE: 90 BPM | BODY MASS INDEX: 31.34 KG/M2 | SYSTOLIC BLOOD PRESSURE: 156 MMHG | HEIGHT: 66 IN | WEIGHT: 195 LBS | DIASTOLIC BLOOD PRESSURE: 87 MMHG

## 2025-03-06 DIAGNOSIS — K58.9 IRRITABLE BOWEL SYNDROME, UNSPECIFIED: ICD-10-CM

## 2025-03-06 DIAGNOSIS — K21.00 GASTRO-ESOPHAGEAL REFLUX DISEASE WITH ESOPHAGITIS, WITHOUT BLEEDING: ICD-10-CM

## 2025-03-06 DIAGNOSIS — D24.1 BENIGN NEOPLASM OF RIGHT BREAST: ICD-10-CM

## 2025-03-06 DIAGNOSIS — F32.A ANXIETY DISORDER, UNSPECIFIED: ICD-10-CM

## 2025-03-06 DIAGNOSIS — F41.9 ANXIETY DISORDER, UNSPECIFIED: ICD-10-CM

## 2025-03-06 DIAGNOSIS — Z78.9 OTHER SPECIFIED HEALTH STATUS: ICD-10-CM

## 2025-03-06 DIAGNOSIS — Z12.39 ENCOUNTER FOR OTHER SCREENING FOR MALIGNANT NEOPLASM OF BREAST: ICD-10-CM

## 2025-03-06 DIAGNOSIS — Z80.6 FAMILY HISTORY OF LEUKEMIA: ICD-10-CM

## 2025-03-06 DIAGNOSIS — R92.8 OTHER ABNORMAL AND INCONCLUSIVE FINDINGS ON DIAGNOSTIC IMAGING OF BREAST: ICD-10-CM

## 2025-03-06 DIAGNOSIS — N60.91 UNSPECIFIED BENIGN MAMMARY DYSPLASIA OF RIGHT BREAST: ICD-10-CM

## 2025-03-06 DIAGNOSIS — Z80.3 FAMILY HISTORY OF MALIGNANT NEOPLASM OF BREAST: ICD-10-CM

## 2025-03-06 DIAGNOSIS — Z86.39 PERSONAL HISTORY OF OTHER ENDOCRINE, NUTRITIONAL AND METABOLIC DISEASE: ICD-10-CM

## 2025-03-06 DIAGNOSIS — Z80.8 FAMILY HISTORY OF MALIGNANT NEOPLASM OF OTHER ORGANS OR SYSTEMS: ICD-10-CM

## 2025-03-06 DIAGNOSIS — K50.919 CROHN'S DISEASE, UNSPECIFIED, WITH UNSPECIFIED COMPLICATIONS: ICD-10-CM

## 2025-03-06 DIAGNOSIS — Z91.89 OTHER SPECIFIED PERSONAL RISK FACTORS, NOT ELSEWHERE CLASSIFIED: ICD-10-CM

## 2025-03-06 DIAGNOSIS — Z81.8 FAMILY HISTORY OF OTHER MENTAL AND BEHAVIORAL DISORDERS: ICD-10-CM

## 2025-03-06 DIAGNOSIS — F40.240 CLAUSTROPHOBIA: ICD-10-CM

## 2025-03-06 DIAGNOSIS — R92.30 DENSE BREASTS, UNSPECIFIED: ICD-10-CM

## 2025-03-06 PROCEDURE — 99205 OFFICE O/P NEW HI 60 MIN: CPT

## 2025-03-06 RX ORDER — MAGNESIUM OXIDE 400 MG/1
400 TABLET ORAL
Refills: 0 | Status: ACTIVE | COMMUNITY

## 2025-03-06 RX ORDER — BIFIDOBACTERIUM LONGUM SUBSP. INFANTIS, AVOBENZONE, HOMOSALATE, OCTISALATE, OCTOCRYLENE, AND OXYBENZONE
KIT
Refills: 0 | Status: ACTIVE | COMMUNITY

## 2025-03-06 RX ORDER — TIZANIDINE 4 MG/1
4 TABLET ORAL
Refills: 0 | Status: ACTIVE | COMMUNITY

## 2025-03-06 RX ORDER — LINACLOTIDE 290 UG/1
290 CAPSULE, GELATIN COATED ORAL
Refills: 0 | Status: ACTIVE | COMMUNITY

## 2025-03-06 RX ORDER — ALPRAZOLAM 0.25 MG/1
0.25 TABLET ORAL
Qty: 3 | Refills: 0 | Status: ACTIVE | COMMUNITY
Start: 2025-03-06 | End: 1900-01-01

## 2025-03-06 RX ORDER — MULTIVIT-MIN/IRON/FOLIC ACID/K 18-600-40
CAPSULE ORAL
Refills: 0 | Status: ACTIVE | COMMUNITY

## 2025-03-20 ENCOUNTER — APPOINTMENT (OUTPATIENT)
Dept: NEUROLOGY | Facility: CLINIC | Age: 50
End: 2025-03-20

## 2025-03-20 RX ORDER — ALPRAZOLAM 0.25 MG/1
0.25 TABLET ORAL
Qty: 3 | Refills: 0 | Status: ACTIVE | COMMUNITY
Start: 2025-03-20 | End: 1900-01-01

## 2025-03-21 ENCOUNTER — OUTPATIENT (OUTPATIENT)
Dept: OUTPATIENT SERVICES | Facility: HOSPITAL | Age: 50
LOS: 1 days | End: 2025-03-21
Payer: COMMERCIAL

## 2025-03-21 ENCOUNTER — APPOINTMENT (OUTPATIENT)
Dept: MRI IMAGING | Facility: IMAGING CENTER | Age: 50
End: 2025-03-21
Payer: COMMERCIAL

## 2025-03-21 ENCOUNTER — APPOINTMENT (OUTPATIENT)
Dept: MAMMOGRAPHY | Facility: IMAGING CENTER | Age: 50
End: 2025-03-21
Payer: COMMERCIAL

## 2025-03-21 ENCOUNTER — RESULT REVIEW (OUTPATIENT)
Age: 50
End: 2025-03-21

## 2025-03-21 ENCOUNTER — APPOINTMENT (OUTPATIENT)
Dept: ULTRASOUND IMAGING | Facility: IMAGING CENTER | Age: 50
End: 2025-03-21
Payer: COMMERCIAL

## 2025-03-21 DIAGNOSIS — Z00.8 ENCOUNTER FOR OTHER GENERAL EXAMINATION: ICD-10-CM

## 2025-03-21 DIAGNOSIS — Z98.890 OTHER SPECIFIED POSTPROCEDURAL STATES: Chronic | ICD-10-CM

## 2025-03-21 DIAGNOSIS — Z91.89 OTHER SPECIFIED PERSONAL RISK FACTORS, NOT ELSEWHERE CLASSIFIED: ICD-10-CM

## 2025-03-21 DIAGNOSIS — N60.91 UNSPECIFIED BENIGN MAMMARY DYSPLASIA OF RIGHT BREAST: ICD-10-CM

## 2025-03-21 DIAGNOSIS — R92.30 DENSE BREASTS, UNSPECIFIED: ICD-10-CM

## 2025-03-21 DIAGNOSIS — Z12.39 ENCOUNTER FOR OTHER SCREENING FOR MALIGNANT NEOPLASM OF BREAST: ICD-10-CM

## 2025-03-21 PROCEDURE — 77065 DX MAMMO INCL CAD UNI: CPT

## 2025-03-21 PROCEDURE — G0279: CPT

## 2025-03-21 PROCEDURE — G0279: CPT | Mod: 26

## 2025-03-21 PROCEDURE — 76641 ULTRASOUND BREAST COMPLETE: CPT | Mod: 26,LT

## 2025-03-21 PROCEDURE — C8937: CPT

## 2025-03-21 PROCEDURE — A9585: CPT

## 2025-03-21 PROCEDURE — 77065 DX MAMMO INCL CAD UNI: CPT | Mod: 26,LT

## 2025-03-21 PROCEDURE — C8908: CPT

## 2025-03-21 PROCEDURE — 77049 MRI BREAST C-+ W/CAD BI: CPT | Mod: 26

## 2025-03-21 PROCEDURE — 76641 ULTRASOUND BREAST COMPLETE: CPT

## 2025-03-26 ENCOUNTER — NON-APPOINTMENT (OUTPATIENT)
Age: 50
End: 2025-03-26

## 2025-03-27 ENCOUNTER — APPOINTMENT (OUTPATIENT)
Dept: BREAST CENTER | Facility: CLINIC | Age: 50
End: 2025-03-27

## 2025-03-31 ENCOUNTER — RESULT REVIEW (OUTPATIENT)
Age: 50
End: 2025-03-31

## 2025-03-31 ENCOUNTER — OUTPATIENT (OUTPATIENT)
Dept: OUTPATIENT SERVICES | Facility: HOSPITAL | Age: 50
LOS: 1 days | End: 2025-03-31
Payer: COMMERCIAL

## 2025-03-31 DIAGNOSIS — D24.1 BENIGN NEOPLASM OF RIGHT BREAST: ICD-10-CM

## 2025-03-31 DIAGNOSIS — Z98.890 OTHER SPECIFIED POSTPROCEDURAL STATES: Chronic | ICD-10-CM

## 2025-03-31 DIAGNOSIS — N60.91 UNSPECIFIED BENIGN MAMMARY DYSPLASIA OF RIGHT BREAST: ICD-10-CM

## 2025-03-31 PROCEDURE — 88321 CONSLTJ&REPRT SLD PREP ELSWR: CPT

## 2025-04-01 ENCOUNTER — OUTPATIENT (OUTPATIENT)
Dept: OUTPATIENT SERVICES | Facility: HOSPITAL | Age: 50
LOS: 1 days | End: 2025-04-01
Payer: COMMERCIAL

## 2025-04-01 DIAGNOSIS — Z01.818 ENCOUNTER FOR OTHER PREPROCEDURAL EXAMINATION: ICD-10-CM

## 2025-04-01 DIAGNOSIS — N60.91 UNSPECIFIED BENIGN MAMMARY DYSPLASIA OF RIGHT BREAST: ICD-10-CM

## 2025-04-01 DIAGNOSIS — D24.1 BENIGN NEOPLASM OF RIGHT BREAST: ICD-10-CM

## 2025-04-01 DIAGNOSIS — Z98.890 OTHER SPECIFIED POSTPROCEDURAL STATES: Chronic | ICD-10-CM

## 2025-04-01 LAB
ALBUMIN SERPL ELPH-MCNC: 3.8 G/DL — SIGNIFICANT CHANGE UP (ref 3.3–5)
ALP SERPL-CCNC: 83 U/L — SIGNIFICANT CHANGE UP (ref 40–120)
ALT FLD-CCNC: 27 U/L — SIGNIFICANT CHANGE UP (ref 12–78)
ANION GAP SERPL CALC-SCNC: 2 MMOL/L — LOW (ref 5–17)
AST SERPL-CCNC: 15 U/L — SIGNIFICANT CHANGE UP (ref 15–37)
BASOPHILS # BLD AUTO: 0.06 K/UL — SIGNIFICANT CHANGE UP (ref 0–0.2)
BASOPHILS NFR BLD AUTO: 0.6 % — SIGNIFICANT CHANGE UP (ref 0–2)
BILIRUB SERPL-MCNC: 0.3 MG/DL — SIGNIFICANT CHANGE UP (ref 0.2–1.2)
BUN SERPL-MCNC: 12 MG/DL — SIGNIFICANT CHANGE UP (ref 7–23)
CALCIUM SERPL-MCNC: 9.4 MG/DL — SIGNIFICANT CHANGE UP (ref 8.5–10.1)
CHLORIDE SERPL-SCNC: 105 MMOL/L — SIGNIFICANT CHANGE UP (ref 96–108)
CO2 SERPL-SCNC: 29 MMOL/L — SIGNIFICANT CHANGE UP (ref 22–31)
CREAT SERPL-MCNC: 0.98 MG/DL — SIGNIFICANT CHANGE UP (ref 0.5–1.3)
EGFR: 72 ML/MIN/1.73M2 — SIGNIFICANT CHANGE UP
EGFR: 72 ML/MIN/1.73M2 — SIGNIFICANT CHANGE UP
EOSINOPHIL # BLD AUTO: 0.16 K/UL — SIGNIFICANT CHANGE UP (ref 0–0.5)
EOSINOPHIL NFR BLD AUTO: 1.7 % — SIGNIFICANT CHANGE UP (ref 0–6)
GLUCOSE SERPL-MCNC: 95 MG/DL — SIGNIFICANT CHANGE UP (ref 70–99)
HCT VFR BLD CALC: 39.1 % — SIGNIFICANT CHANGE UP (ref 34.5–45)
HGB BLD-MCNC: 13.2 G/DL — SIGNIFICANT CHANGE UP (ref 11.5–15.5)
IMM GRANULOCYTES # BLD AUTO: 0.01 K/UL — SIGNIFICANT CHANGE UP (ref 0–0.07)
IMM GRANULOCYTES NFR BLD AUTO: 0.1 % — SIGNIFICANT CHANGE UP (ref 0–0.9)
LYMPHOCYTES # BLD AUTO: 2.07 K/UL — SIGNIFICANT CHANGE UP (ref 1–3.3)
LYMPHOCYTES NFR BLD AUTO: 22.3 % — SIGNIFICANT CHANGE UP (ref 13–44)
MCHC RBC-ENTMCNC: 29.9 PG — SIGNIFICANT CHANGE UP (ref 27–34)
MCHC RBC-ENTMCNC: 33.8 G/DL — SIGNIFICANT CHANGE UP (ref 32–36)
MCV RBC AUTO: 88.5 FL — SIGNIFICANT CHANGE UP (ref 80–100)
MONOCYTES # BLD AUTO: 0.59 K/UL — SIGNIFICANT CHANGE UP (ref 0–0.9)
MONOCYTES NFR BLD AUTO: 6.4 % — SIGNIFICANT CHANGE UP (ref 2–14)
NEUTROPHILS # BLD AUTO: 6.4 K/UL — SIGNIFICANT CHANGE UP (ref 1.8–7.4)
NEUTROPHILS NFR BLD AUTO: 68.9 % — SIGNIFICANT CHANGE UP (ref 43–77)
NRBC # BLD AUTO: 0 K/UL — SIGNIFICANT CHANGE UP (ref 0–0)
NRBC # FLD: 0 K/UL — SIGNIFICANT CHANGE UP (ref 0–0)
NRBC BLD AUTO-RTO: 0 /100 WBCS — SIGNIFICANT CHANGE UP (ref 0–0)
PLATELET # BLD AUTO: 300 K/UL — SIGNIFICANT CHANGE UP (ref 150–400)
PMV BLD: 10 FL — SIGNIFICANT CHANGE UP (ref 7–13)
POTASSIUM SERPL-MCNC: 4.3 MMOL/L — SIGNIFICANT CHANGE UP (ref 3.5–5.3)
POTASSIUM SERPL-SCNC: 4.3 MMOL/L — SIGNIFICANT CHANGE UP (ref 3.5–5.3)
PROT SERPL-MCNC: 7.4 GM/DL — SIGNIFICANT CHANGE UP (ref 6–8.3)
RBC # BLD: 4.42 M/UL — SIGNIFICANT CHANGE UP (ref 3.8–5.2)
RBC # FLD: 13.3 % — SIGNIFICANT CHANGE UP (ref 10.3–14.5)
SODIUM SERPL-SCNC: 136 MMOL/L — SIGNIFICANT CHANGE UP (ref 135–145)
SURGICAL PATHOLOGY STUDY: SIGNIFICANT CHANGE UP
WBC # BLD: 9.29 K/UL — SIGNIFICANT CHANGE UP (ref 3.8–10.5)
WBC # FLD AUTO: 9.29 K/UL — SIGNIFICANT CHANGE UP (ref 3.8–10.5)

## 2025-04-01 PROCEDURE — 80053 COMPREHEN METABOLIC PANEL: CPT

## 2025-04-01 PROCEDURE — 36415 COLL VENOUS BLD VENIPUNCTURE: CPT

## 2025-04-01 PROCEDURE — 85025 COMPLETE CBC W/AUTO DIFF WBC: CPT

## 2025-04-01 NOTE — CHART NOTE - NSCHARTNOTEFT_GEN_A_CORE
50 y/o female presents to PST for scheduled right breast biopsy with magseed on 4/9/25.    cbc, bmp, type done today in PST       vs- 135/80 hr 98 temp 97.9 rr 16 o2 sat 100%       atypical hyperplasia duct right breast   Pre op and chlorhexidine instructions given and explained.  Avoid NSAIDs and OTC supplements.   Patient verbalized understanding  medical optimization requested by surgeon   Urine for pregnancy on day of surgery

## 2025-04-01 NOTE — ASU PATIENT PROFILE, ADULT - AS SC BRADEN SENSORY
(4) no impairment The patient has been re-examined and I agree with the above assessment or I updated with my findings.

## 2025-04-01 NOTE — ASU PATIENT PROFILE, ADULT - NSICDXPASTMEDICALHX_GEN_ALL_CORE_FT
PAST MEDICAL HISTORY:  Anxiety with depression     Asthma     Atypical ductal hyperplasia, breast right    Crohn's disease - in remission, no meds    GERD (gastroesophageal reflux disease)     OCD (obsessive compulsive disorder)     TAMMIE (obstructive sleep apnea) -  mild, no treatment    Pneumonia -  b/l, 2017    Polyp of corpus uteri

## 2025-04-02 DIAGNOSIS — Z01.818 ENCOUNTER FOR OTHER PREPROCEDURAL EXAMINATION: ICD-10-CM

## 2025-04-03 ENCOUNTER — APPOINTMENT (OUTPATIENT)
Dept: ULTRASOUND IMAGING | Facility: CLINIC | Age: 50
End: 2025-04-03
Payer: COMMERCIAL

## 2025-04-03 PROCEDURE — 19285Z: CUSTOM | Mod: RT

## 2025-04-09 ENCOUNTER — OUTPATIENT (OUTPATIENT)
Dept: INPATIENT UNIT | Facility: HOSPITAL | Age: 50
LOS: 1 days | Discharge: HOME | End: 2025-04-09
Payer: COMMERCIAL

## 2025-04-09 ENCOUNTER — RESULT REVIEW (OUTPATIENT)
Age: 50
End: 2025-04-09

## 2025-04-09 ENCOUNTER — TRANSCRIPTION ENCOUNTER (OUTPATIENT)
Age: 50
End: 2025-04-09

## 2025-04-09 ENCOUNTER — APPOINTMENT (OUTPATIENT)
Dept: BREAST CENTER | Facility: HOSPITAL | Age: 50
End: 2025-04-09

## 2025-04-09 ENCOUNTER — NON-APPOINTMENT (OUTPATIENT)
Age: 50
End: 2025-04-09

## 2025-04-09 VITALS — SYSTOLIC BLOOD PRESSURE: 117 MMHG | DIASTOLIC BLOOD PRESSURE: 69 MMHG | TEMPERATURE: 98 F

## 2025-04-09 VITALS
HEART RATE: 96 BPM | DIASTOLIC BLOOD PRESSURE: 64 MMHG | HEIGHT: 66 IN | SYSTOLIC BLOOD PRESSURE: 112 MMHG | OXYGEN SATURATION: 99 % | WEIGHT: 195.11 LBS | RESPIRATION RATE: 16 BRPM | TEMPERATURE: 98 F

## 2025-04-09 DIAGNOSIS — D24.1 BENIGN NEOPLASM OF RIGHT BREAST: ICD-10-CM

## 2025-04-09 DIAGNOSIS — Z98.890 OTHER SPECIFIED POSTPROCEDURAL STATES: Chronic | ICD-10-CM

## 2025-04-09 DIAGNOSIS — N60.91 UNSPECIFIED BENIGN MAMMARY DYSPLASIA OF RIGHT BREAST: ICD-10-CM

## 2025-04-09 LAB — HCG UR QL: NEGATIVE — SIGNIFICANT CHANGE UP

## 2025-04-09 PROCEDURE — 94640 AIRWAY INHALATION TREATMENT: CPT

## 2025-04-09 PROCEDURE — 76098 X-RAY EXAM SURGICAL SPECIMEN: CPT | Mod: 26

## 2025-04-09 PROCEDURE — 88307 TISSUE EXAM BY PATHOLOGIST: CPT | Mod: 26

## 2025-04-09 PROCEDURE — 19125 EXCISION BREAST LESION: CPT | Mod: RT

## 2025-04-09 PROCEDURE — 81025 URINE PREGNANCY TEST: CPT

## 2025-04-09 PROCEDURE — 76098 X-RAY EXAM SURGICAL SPECIMEN: CPT

## 2025-04-09 PROCEDURE — 88307 TISSUE EXAM BY PATHOLOGIST: CPT

## 2025-04-09 RX ORDER — LINACLOTIDE 290 UG/1
1 CAPSULE, GELATIN COATED ORAL
Refills: 0 | DISCHARGE

## 2025-04-09 RX ORDER — UBROGEPANT 50 MG/1
1 TABLET ORAL
Refills: 0 | DISCHARGE

## 2025-04-09 RX ORDER — FENTANYL CITRATE-0.9 % NACL/PF 100MCG/2ML
50 SYRINGE (ML) INTRAVENOUS
Refills: 0 | Status: DISCONTINUED | OUTPATIENT
Start: 2025-04-09 | End: 2025-04-09

## 2025-04-09 RX ORDER — SODIUM CHLORIDE 9 G/1000ML
1000 INJECTION, SOLUTION INTRAVENOUS
Refills: 0 | Status: DISCONTINUED | OUTPATIENT
Start: 2025-04-09 | End: 2025-04-09

## 2025-04-09 RX ORDER — OXYCODONE HYDROCHLORIDE 30 MG/1
5 TABLET ORAL ONCE
Refills: 0 | Status: DISCONTINUED | OUTPATIENT
Start: 2025-04-09 | End: 2025-04-09

## 2025-04-09 RX ORDER — GALCANEZUMAB 120 MG/ML
120 INJECTION, SOLUTION SUBCUTANEOUS
Refills: 0 | DISCHARGE

## 2025-04-09 RX ORDER — IPRATROPIUM BROMIDE AND ALBUTEROL SULFATE .5; 2.5 MG/3ML; MG/3ML
3 SOLUTION RESPIRATORY (INHALATION) ONCE
Refills: 0 | Status: COMPLETED | OUTPATIENT
Start: 2025-04-09 | End: 2025-04-09

## 2025-04-09 RX ORDER — OMEPRAZOLE 20 MG/1
1 CAPSULE, DELAYED RELEASE ORAL
Refills: 0 | DISCHARGE

## 2025-04-09 RX ORDER — ACETAMINOPHEN 500 MG/5ML
1000 LIQUID (ML) ORAL ONCE
Refills: 0 | Status: DISCONTINUED | OUTPATIENT
Start: 2025-04-09 | End: 2025-04-09

## 2025-04-09 RX ORDER — BUPROPION HYDROBROMIDE 522 MG/1
1 TABLET, EXTENDED RELEASE ORAL
Refills: 0 | DISCHARGE

## 2025-04-09 RX ORDER — ONDANSETRON HCL/PF 4 MG/2 ML
4 VIAL (ML) INJECTION EVERY 6 HOURS
Refills: 0 | Status: DISCONTINUED | OUTPATIENT
Start: 2025-04-09 | End: 2025-04-09

## 2025-04-09 RX ADMIN — IPRATROPIUM BROMIDE AND ALBUTEROL SULFATE 3 MILLILITER(S): .5; 2.5 SOLUTION RESPIRATORY (INHALATION) at 14:53

## 2025-04-09 NOTE — ASU DISCHARGE PLAN (ADULT/PEDIATRIC) - FINANCIAL ASSISTANCE
[facility] provides services at a reduced cost to those who are determined to be eligible through [facility]’s financial assistance program. Information regarding [facility]’s financial assistance program can be found by going to [email or web address] or by calling [phone].

## 2025-04-09 NOTE — ASU PATIENT PROFILE, ADULT - NSSUBSTANCEUSE_GEN_ALL_CORE_SD
Vaccine Information Statement(s) was given today. This has been reviewed, questions answered, and verbal consent given by Patient for injection(s) and administration of Influenza (Inactivated).    1. Does the patient have a moderate to severe fever?  No  2. Has the patient had a serious reaction to a flu shot before?   No  3. Has the patient ever had Guillian Ledbetter Syndrome within 6 weeks of a previous flu shot?  No  4. Is the patient less than 6 months of age?  No    Patient is eligible to receive the vaccine based on all questions being answered as 'No'.    Patient tolerated without incident. See immunization grid for documentation.        
denies/never used

## 2025-04-09 NOTE — ASU DISCHARGE PLAN (ADULT/PEDIATRIC) - NS MD DC FALL RISK RISK
For information on Fall & Injury Prevention, visit: [email or web address] OR  [email or web address] OR  https://www.cdc.gov/steadi/patient.html

## 2025-04-09 NOTE — BRIEF OPERATIVE NOTE - NSICDXBRIEFPOSTOP_GEN_ALL_CORE_FT
POST-OP DIAGNOSIS:  Papilloma of right breast 09-Apr-2025 13:27:04  Deya Morrell  Atypical ductal hyperplasia of right breast 09-Apr-2025 13:27:01  Deya Morrell

## 2025-04-09 NOTE — BRIEF OPERATIVE NOTE - NSICDXBRIEFPREOP_GEN_ALL_CORE_FT
PRE-OP DIAGNOSIS:  Atypical ductal hyperplasia of right breast 09-Apr-2025 13:26:55  Deya Morrell  Papilloma of right breast 09-Apr-2025 13:26:47  Deya Morrell

## 2025-04-09 NOTE — BRIEF OPERATIVE NOTE - NSICDXBRIEFPROCEDURE_GEN_ALL_CORE_FT
PROCEDURES:  Excisional breast biopsy with radiologic localization 09-Apr-2025 13:26:39  Deya Morrell

## 2025-04-09 NOTE — ASU DISCHARGE PLAN (ADULT/PEDIATRIC) - CARE PROVIDER_API CALL
Deya Morrell [facility]  Surgery  [address] [place] [id number]  Phone: [phone]  Fax: [phone]  Follow Up Time:

## 2025-04-10 ENCOUNTER — NON-APPOINTMENT (OUTPATIENT)
Age: 50
End: 2025-04-10

## 2025-04-14 LAB — SURGICAL PATHOLOGY STUDY: SIGNIFICANT CHANGE UP

## 2025-04-16 ENCOUNTER — NON-APPOINTMENT (OUTPATIENT)
Age: 50
End: 2025-04-16

## 2025-04-16 DIAGNOSIS — J45.909 UNSPECIFIED ASTHMA, UNCOMPLICATED: ICD-10-CM

## 2025-04-16 DIAGNOSIS — N60.81 OTHER BENIGN MAMMARY DYSPLASIAS OF RIGHT BREAST: ICD-10-CM

## 2025-04-16 DIAGNOSIS — Z88.6 ALLERGY STATUS TO ANALGESIC AGENT: ICD-10-CM

## 2025-04-16 DIAGNOSIS — F40.240 CLAUSTROPHOBIA: ICD-10-CM

## 2025-04-16 DIAGNOSIS — K50.919 CROHN'S DISEASE, UNSPECIFIED, WITH UNSPECIFIED COMPLICATIONS: ICD-10-CM

## 2025-04-16 DIAGNOSIS — E78.5 HYPERLIPIDEMIA, UNSPECIFIED: ICD-10-CM

## 2025-04-16 DIAGNOSIS — D24.1 BENIGN NEOPLASM OF RIGHT BREAST: ICD-10-CM

## 2025-04-16 DIAGNOSIS — N60.31 FIBROSCLEROSIS OF RIGHT BREAST: ICD-10-CM

## 2025-04-16 DIAGNOSIS — K21.9 GASTRO-ESOPHAGEAL REFLUX DISEASE WITHOUT ESOPHAGITIS: ICD-10-CM

## 2025-04-21 ENCOUNTER — APPOINTMENT (OUTPATIENT)
Dept: BREAST CENTER | Facility: CLINIC | Age: 50
End: 2025-04-21
Payer: COMMERCIAL

## 2025-04-21 VITALS
DIASTOLIC BLOOD PRESSURE: 86 MMHG | HEART RATE: 92 BPM | HEIGHT: 66 IN | SYSTOLIC BLOOD PRESSURE: 129 MMHG | BODY MASS INDEX: 31.34 KG/M2 | WEIGHT: 195 LBS

## 2025-04-21 DIAGNOSIS — Z91.89 OTHER SPECIFIED PERSONAL RISK FACTORS, NOT ELSEWHERE CLASSIFIED: ICD-10-CM

## 2025-04-21 DIAGNOSIS — R92.8 OTHER ABNORMAL AND INCONCLUSIVE FINDINGS ON DIAGNOSTIC IMAGING OF BREAST: ICD-10-CM

## 2025-04-21 DIAGNOSIS — D24.1 BENIGN NEOPLASM OF RIGHT BREAST: ICD-10-CM

## 2025-04-21 DIAGNOSIS — N60.91 UNSPECIFIED BENIGN MAMMARY DYSPLASIA OF RIGHT BREAST: ICD-10-CM

## 2025-04-21 PROBLEM — N60.99 UNSPECIFIED BENIGN MAMMARY DYSPLASIA OF UNSPECIFIED BREAST: Chronic | Status: ACTIVE | Noted: 2025-04-01

## 2025-04-21 PROCEDURE — 99024 POSTOP FOLLOW-UP VISIT: CPT

## 2025-06-04 NOTE — ASU PREOP CHECKLIST - IDENTIFICATION BAND VERIFIED
PHYSICAL THERAPY - DAILY TREATMENT NOTE (updated 3/23)      Date: 2025          Patient Name:  Tyrese King :  1987   Medical   Diagnosis:  Leg pain, anterior, left [M79.605] Treatment Diagnosis:  M79.605  Pain in left leg  and M62.838  OTHER MUSCLE WEAKNESS and N39.46  MIXED INCONTINENCE    Referral Source:  Mari Gold MD Insurance:   Payor: [employer] / Plan: [employer] - CHOICE PLUS / Product Type: *No Product type* /                   0  Patient  verified yes     Visit #   Current  / Total 2 24   Time   In / Out 3:25 pm  4:30    Total Treatment Time 65   Total Timed Codes 55         SUBJECTIVE  If an interpreting service was utilized for treatment of this patient, the contents of this document represent the material reviewed with the patient via the .     Pain Level (0-10 scale): 3/10    Any medication changes, allergies to medications, adverse drug reactions, diagnosis change, or new procedure performed?: [x] No    [] Yes (see summary sheet for update)  Medications: Verified on Patient Summary List    Subjective functional status/changes:     Pt reports continued Leg pain with numbness. She notes an increase in symptoms with carrying her little one. She also reports onset of hemorrhoids as well as numbness in the Les. Will continue to progress per POC.     OBJECTIVE      Therapeutic Procedures:  Tx Min Billable or 1:1 Min (if diff from Tx Min) Procedure, Rationale, Specifics   15  65499 Self Care/Home Management (timed):  improve patient knowledge and understanding of home injury/symptom/pain management, positioning, and posture/ergonomics  to improve patient's ability to progress to PLOF and address remaining functional goals.  (see flow sheet as applicable)     Details if applicable:  Pt educated on how to avoid valsalva and integration of spine stabilization techniques.    25  41289 Neuromuscular Re-Education (timed):  improve balance, coordination, kinesthetic  done

## 2025-07-09 ENCOUNTER — APPOINTMENT (OUTPATIENT)
Dept: PULMONOLOGY | Facility: CLINIC | Age: 50
End: 2025-07-09
Payer: COMMERCIAL

## 2025-07-09 VITALS — OXYGEN SATURATION: 98 % | DIASTOLIC BLOOD PRESSURE: 85 MMHG | HEART RATE: 94 BPM | SYSTOLIC BLOOD PRESSURE: 136 MMHG

## 2025-07-09 PROCEDURE — 94729 DIFFUSING CAPACITY: CPT

## 2025-07-09 PROCEDURE — 71046 X-RAY EXAM CHEST 2 VIEWS: CPT

## 2025-07-09 PROCEDURE — 94010 BREATHING CAPACITY TEST: CPT

## 2025-07-09 PROCEDURE — ZZZZZ: CPT

## 2025-07-09 PROCEDURE — 99214 OFFICE O/P EST MOD 30 MIN: CPT | Mod: 25

## 2025-07-09 PROCEDURE — 94727 GAS DIL/WSHOT DETER LNG VOL: CPT

## 2025-07-14 ENCOUNTER — APPOINTMENT (OUTPATIENT)
Dept: NEUROLOGY | Facility: CLINIC | Age: 50
End: 2025-07-14
Payer: COMMERCIAL

## 2025-07-14 VITALS
BODY MASS INDEX: 31.5 KG/M2 | HEIGHT: 66 IN | HEART RATE: 102 BPM | DIASTOLIC BLOOD PRESSURE: 85 MMHG | WEIGHT: 196 LBS | SYSTOLIC BLOOD PRESSURE: 127 MMHG

## 2025-07-14 PROBLEM — G43.009 MIGRAINE WITHOUT AURA AND WITHOUT STATUS MIGRAINOSUS, NOT INTRACTABLE: Status: ACTIVE | Noted: 2025-07-14

## 2025-07-14 PROCEDURE — 99214 OFFICE O/P EST MOD 30 MIN: CPT

## 2025-07-14 RX ORDER — UBROGEPANT 100 MG/1
100 TABLET ORAL
Qty: 16 | Refills: 6 | Status: ACTIVE | COMMUNITY
Start: 2025-07-14 | End: 1900-01-01

## 2025-07-18 ENCOUNTER — OUTPATIENT (OUTPATIENT)
Dept: OUTPATIENT SERVICES | Facility: HOSPITAL | Age: 50
LOS: 1 days | End: 2025-07-18
Payer: COMMERCIAL

## 2025-07-18 VITALS
RESPIRATION RATE: 18 BRPM | OXYGEN SATURATION: 96 % | SYSTOLIC BLOOD PRESSURE: 144 MMHG | WEIGHT: 197.09 LBS | HEIGHT: 66 IN | HEART RATE: 93 BPM | TEMPERATURE: 98 F | DIASTOLIC BLOOD PRESSURE: 84 MMHG

## 2025-07-18 DIAGNOSIS — N84.0 POLYP OF CORPUS UTERI: ICD-10-CM

## 2025-07-18 DIAGNOSIS — Z98.890 OTHER SPECIFIED POSTPROCEDURAL STATES: Chronic | ICD-10-CM

## 2025-07-18 DIAGNOSIS — G47.33 OBSTRUCTIVE SLEEP APNEA (ADULT) (PEDIATRIC): ICD-10-CM

## 2025-07-18 LAB
ALBUMIN SERPL ELPH-MCNC: 4.1 G/DL — SIGNIFICANT CHANGE UP (ref 3.3–5)
ALP SERPL-CCNC: 66 U/L — SIGNIFICANT CHANGE UP (ref 40–120)
ALT FLD-CCNC: 15 U/L — SIGNIFICANT CHANGE UP (ref 10–45)
ANION GAP SERPL CALC-SCNC: 13 MMOL/L — SIGNIFICANT CHANGE UP (ref 5–17)
AST SERPL-CCNC: 16 U/L — SIGNIFICANT CHANGE UP (ref 10–40)
BILIRUB SERPL-MCNC: 0.3 MG/DL — SIGNIFICANT CHANGE UP (ref 0.2–1.2)
BUN SERPL-MCNC: 14 MG/DL — SIGNIFICANT CHANGE UP (ref 7–23)
CALCIUM SERPL-MCNC: 9.3 MG/DL — SIGNIFICANT CHANGE UP (ref 8.4–10.5)
CHLORIDE SERPL-SCNC: 105 MMOL/L — SIGNIFICANT CHANGE UP (ref 96–108)
CO2 SERPL-SCNC: 22 MMOL/L — SIGNIFICANT CHANGE UP (ref 22–31)
CREAT SERPL-MCNC: 0.99 MG/DL — SIGNIFICANT CHANGE UP (ref 0.5–1.3)
EGFR: 71 ML/MIN/1.73M2 — SIGNIFICANT CHANGE UP
EGFR: 71 ML/MIN/1.73M2 — SIGNIFICANT CHANGE UP
GLUCOSE SERPL-MCNC: 94 MG/DL — SIGNIFICANT CHANGE UP (ref 70–99)
HCT VFR BLD CALC: 38.2 % — SIGNIFICANT CHANGE UP (ref 34.5–45)
HGB BLD-MCNC: 12.5 G/DL — SIGNIFICANT CHANGE UP (ref 11.5–15.5)
MCHC RBC-ENTMCNC: 29.1 PG — SIGNIFICANT CHANGE UP (ref 27–34)
MCHC RBC-ENTMCNC: 32.7 G/DL — SIGNIFICANT CHANGE UP (ref 32–36)
MCV RBC AUTO: 89 FL — SIGNIFICANT CHANGE UP (ref 80–100)
NRBC # BLD AUTO: 0 K/UL — SIGNIFICANT CHANGE UP (ref 0–0)
NRBC # FLD: 0 K/UL — SIGNIFICANT CHANGE UP (ref 0–0)
NRBC BLD AUTO-RTO: 0 /100 WBCS — SIGNIFICANT CHANGE UP (ref 0–0)
PLATELET # BLD AUTO: 296 K/UL — SIGNIFICANT CHANGE UP (ref 150–400)
PMV BLD: 10.5 FL — SIGNIFICANT CHANGE UP (ref 7–13)
POTASSIUM SERPL-MCNC: 4.2 MMOL/L — SIGNIFICANT CHANGE UP (ref 3.5–5.3)
POTASSIUM SERPL-SCNC: 4.2 MMOL/L — SIGNIFICANT CHANGE UP (ref 3.5–5.3)
PROT SERPL-MCNC: 6.7 G/DL — SIGNIFICANT CHANGE UP (ref 6–8.3)
RBC # BLD: 4.29 M/UL — SIGNIFICANT CHANGE UP (ref 3.8–5.2)
RBC # FLD: 14.6 % — HIGH (ref 10.3–14.5)
SODIUM SERPL-SCNC: 140 MMOL/L — SIGNIFICANT CHANGE UP (ref 135–145)
WBC # BLD: 7.48 K/UL — SIGNIFICANT CHANGE UP (ref 3.8–10.5)
WBC # FLD AUTO: 7.48 K/UL — SIGNIFICANT CHANGE UP (ref 3.8–10.5)

## 2025-07-18 PROCEDURE — 85027 COMPLETE CBC AUTOMATED: CPT

## 2025-07-18 PROCEDURE — G0463: CPT

## 2025-07-18 PROCEDURE — 80053 COMPREHEN METABOLIC PANEL: CPT

## 2025-07-18 RX ORDER — TAMOXIFEN CITRATE 10 MG/1
1 TABLET, FILM COATED ORAL
Refills: 0 | DISCHARGE

## 2025-07-18 NOTE — H&P PST ADULT - HISTORY OF PRESENT ILLNESS
Patient comes to [facility] for D&C, diagnostic hysteroscopy, possible polypectomy w/AVSHUKRI with MD Jordan on 8/1/25. Patient has a pmhx of asthma (controlled, lucía uses rescue inhaler), depression/anxiety, GERD, breast lump, crohns, HLD, sleep apnea (no CPAP, mild)   CT chest of done due to chest pain (followed with cardiology, normal workup was found to be stress related). in January and found lump in breast and had lumpectomy, pathology was negative but had abnormal findings so was placed on tamoxifen ( was told to stop taking 7/16/25 for upcoming procedure. Patient then had a vaginal ultrasound and found ' something on cervix'. Patient denies any irregular bleeding. denies any pain or discomfort.  Patient comes to [facility] for D&C, diagnostic hysteroscopy, possible polypectomy w/AVETA with MD Jordan on 8/1/25. Patient has a pmhx of asthma (controlled, rarely uses rescue inhaler), depression/anxiety, GERD, breast lump ( ad recent lumpectomy, on tamoxifen), crohns, HLD, sleep apnea (no CPAP, mild)   CT chest of done due to chest pain (followed with cardiology, normal workup was found to be stress related). in January and found lump in breast and had lumpectomy, pathology was negative but had abnormal findings so was placed on tamoxifen ( was told to stop taking 7/16/25 for upcoming procedure. Patient then had a vaginal ultrasound and found ' something on cervix'. Patient denies any irregular bleeding. denies any pain or discomfort. Denies any other complaints at this time

## 2025-07-18 NOTE — H&P PST ADULT - NEGATIVE GENERAL GENITOURINARY SYMPTOMS
no hematuria/no renal colic/no flank pain L/no flank pain R/no urine discoloration/no incontinence/no urinary hesitancy/normal urinary frequency

## 2025-07-18 NOTE — H&P PST ADULT - NSICDXPASTSURGICALHX_GEN_ALL_CORE_FT
PAST SURGICAL HISTORY:  H/O excision of mass Left foot, 1990s    H/O foot surgery     H/O lumpectomy

## 2025-07-18 NOTE — H&P PST ADULT - NEGATIVE MUSCULOSKELETAL SYMPTOMS
no arthralgia/no arthritis/no joint swelling/no muscle cramps/no muscle weakness/no stiffness/no neck pain/no back pain/no leg pain L/no leg pain R

## 2025-07-18 NOTE — H&P PST ADULT - NSICDXPASTMEDICALHX_GEN_ALL_CORE_FT
PAST MEDICAL HISTORY:  Anxiety with depression     Asthma     Atypical ductal hyperplasia, breast right    Crohn's disease - in remission, no meds    GERD (gastroesophageal reflux disease)     Hyperlipidemia     OCD (obsessive compulsive disorder)     TAMMIE (obstructive sleep apnea) -  mild, no treatment    Pneumonia -  b/l, 2017    Polyp of corpus uteri

## 2025-07-18 NOTE — H&P PST ADULT - ADDITIONAL PE
DASI score: 9.89  DASI activity: walks 1-2 times a week, denies any cardiac symptoms   Loose teeth or denture: denies

## 2025-07-18 NOTE — H&P PST ADULT - PROBLEM SELECTOR PLAN 1
educated on instructions  Labs in PST; CBC CMP  Day of; Pregnancy    was taken of tamoxifen by oncologist for surgery on 7/16/25 educated on instructions  Labs in PST; CBC CMP  Day of; Pregnancy    was taken off tamoxifen by oncologist for surgery last dose was 7/16/25

## 2025-07-18 NOTE — H&P PST ADULT - OTHER CARE PROVIDERS
Pulm; [facility]  [phone]   Cardiac; [facility] [phone]        GI; [facility] [phone] Pulm; [facility]-  [phone]   Cardiac; [place] [phone]     GI; [place] [phone] Breast: [facility] [phone]

## 2025-07-22 PROBLEM — E78.5 HYPERLIPIDEMIA, UNSPECIFIED: Chronic | Status: ACTIVE | Noted: 2025-07-18

## 2025-08-18 ENCOUNTER — OUTPATIENT (OUTPATIENT)
Dept: OUTPATIENT SERVICES | Facility: HOSPITAL | Age: 50
LOS: 1 days | End: 2025-08-18
Payer: COMMERCIAL

## 2025-08-18 ENCOUNTER — APPOINTMENT (OUTPATIENT)
Dept: MRI IMAGING | Facility: CLINIC | Age: 50
End: 2025-08-18

## 2025-08-18 DIAGNOSIS — Z98.890 OTHER SPECIFIED POSTPROCEDURAL STATES: Chronic | ICD-10-CM

## 2025-08-18 DIAGNOSIS — R51.9 HEADACHE, UNSPECIFIED: ICD-10-CM

## 2025-08-18 DIAGNOSIS — G43.709 CHRONIC MIGRAINE WITHOUT AURA, NOT INTRACTABLE, WITHOUT STATUS MIGRAINOSUS: ICD-10-CM

## 2025-08-18 PROCEDURE — 70551 MRI BRAIN STEM W/O DYE: CPT

## 2025-08-18 PROCEDURE — 70551 MRI BRAIN STEM W/O DYE: CPT | Mod: 26

## 2025-08-28 ENCOUNTER — EMERGENCY (EMERGENCY)
Facility: HOSPITAL | Age: 50
LOS: 1 days | End: 2025-08-28
Attending: EMERGENCY MEDICINE | Admitting: EMERGENCY MEDICINE
Payer: COMMERCIAL

## 2025-08-28 VITALS
SYSTOLIC BLOOD PRESSURE: 113 MMHG | OXYGEN SATURATION: 97 % | WEIGHT: 195.11 LBS | DIASTOLIC BLOOD PRESSURE: 82 MMHG | HEIGHT: 66 IN | RESPIRATION RATE: 17 BRPM | HEART RATE: 104 BPM | TEMPERATURE: 99 F

## 2025-08-28 VITALS
OXYGEN SATURATION: 98 % | SYSTOLIC BLOOD PRESSURE: 104 MMHG | RESPIRATION RATE: 18 BRPM | DIASTOLIC BLOOD PRESSURE: 86 MMHG | HEART RATE: 87 BPM

## 2025-08-28 DIAGNOSIS — Z98.890 OTHER SPECIFIED POSTPROCEDURAL STATES: Chronic | ICD-10-CM

## 2025-08-28 LAB
ALBUMIN SERPL ELPH-MCNC: 3.5 G/DL — SIGNIFICANT CHANGE UP (ref 3.3–5)
ALP SERPL-CCNC: 64 U/L — SIGNIFICANT CHANGE UP (ref 40–120)
ALT FLD-CCNC: 19 U/L — SIGNIFICANT CHANGE UP (ref 12–78)
ANION GAP SERPL CALC-SCNC: 5 MMOL/L — SIGNIFICANT CHANGE UP (ref 5–17)
AST SERPL-CCNC: 11 U/L — LOW (ref 15–37)
BASOPHILS # BLD AUTO: 0.05 K/UL — SIGNIFICANT CHANGE UP (ref 0–0.2)
BASOPHILS NFR BLD AUTO: 0.6 % — SIGNIFICANT CHANGE UP (ref 0–2)
BILIRUB SERPL-MCNC: 0.2 MG/DL — SIGNIFICANT CHANGE UP (ref 0.2–1.2)
BUN SERPL-MCNC: 16 MG/DL — SIGNIFICANT CHANGE UP (ref 7–23)
CALCIUM SERPL-MCNC: 8.6 MG/DL — SIGNIFICANT CHANGE UP (ref 8.5–10.1)
CHLORIDE SERPL-SCNC: 109 MMOL/L — HIGH (ref 96–108)
CO2 SERPL-SCNC: 27 MMOL/L — SIGNIFICANT CHANGE UP (ref 22–31)
CREAT SERPL-MCNC: 1.1 MG/DL — SIGNIFICANT CHANGE UP (ref 0.5–1.3)
EGFR: 62 ML/MIN/1.73M2 — SIGNIFICANT CHANGE UP
EGFR: 62 ML/MIN/1.73M2 — SIGNIFICANT CHANGE UP
EOSINOPHIL # BLD AUTO: 0.12 K/UL — SIGNIFICANT CHANGE UP (ref 0–0.5)
EOSINOPHIL NFR BLD AUTO: 1.3 % — SIGNIFICANT CHANGE UP (ref 0–6)
GLUCOSE SERPL-MCNC: 100 MG/DL — HIGH (ref 70–99)
HCG SERPL-ACNC: <1 MIU/ML — SIGNIFICANT CHANGE UP
HCT VFR BLD CALC: 37 % — SIGNIFICANT CHANGE UP (ref 34.5–45)
HGB BLD-MCNC: 12.8 G/DL — SIGNIFICANT CHANGE UP (ref 11.5–15.5)
IMM GRANULOCYTES # BLD AUTO: 0.02 K/UL — SIGNIFICANT CHANGE UP (ref 0–0.07)
IMM GRANULOCYTES NFR BLD AUTO: 0.2 % — SIGNIFICANT CHANGE UP (ref 0–0.9)
LIDOCAIN IGE QN: 40 U/L — SIGNIFICANT CHANGE UP (ref 13–75)
LYMPHOCYTES # BLD AUTO: 1.88 K/UL — SIGNIFICANT CHANGE UP (ref 1–3.3)
LYMPHOCYTES NFR BLD AUTO: 20.9 % — SIGNIFICANT CHANGE UP (ref 13–44)
MCHC RBC-ENTMCNC: 30.1 PG — SIGNIFICANT CHANGE UP (ref 27–34)
MCHC RBC-ENTMCNC: 34.6 G/DL — SIGNIFICANT CHANGE UP (ref 32–36)
MCV RBC AUTO: 87.1 FL — SIGNIFICANT CHANGE UP (ref 80–100)
MONOCYTES # BLD AUTO: 0.65 K/UL — SIGNIFICANT CHANGE UP (ref 0–0.9)
MONOCYTES NFR BLD AUTO: 7.2 % — SIGNIFICANT CHANGE UP (ref 2–14)
NEUTROPHILS # BLD AUTO: 6.27 K/UL — SIGNIFICANT CHANGE UP (ref 1.8–7.4)
NEUTROPHILS NFR BLD AUTO: 69.8 % — SIGNIFICANT CHANGE UP (ref 43–77)
NRBC # BLD AUTO: 0 K/UL — SIGNIFICANT CHANGE UP (ref 0–0)
NRBC # FLD: 0 K/UL — SIGNIFICANT CHANGE UP (ref 0–0)
NRBC BLD AUTO-RTO: 0 /100 WBCS — SIGNIFICANT CHANGE UP (ref 0–0)
PLATELET # BLD AUTO: 316 K/UL — SIGNIFICANT CHANGE UP (ref 150–400)
PMV BLD: 10.4 FL — SIGNIFICANT CHANGE UP (ref 7–13)
POTASSIUM SERPL-MCNC: 3.9 MMOL/L — SIGNIFICANT CHANGE UP (ref 3.5–5.3)
POTASSIUM SERPL-SCNC: 3.9 MMOL/L — SIGNIFICANT CHANGE UP (ref 3.5–5.3)
PROT SERPL-MCNC: 6.7 G/DL — SIGNIFICANT CHANGE UP (ref 6–8.3)
RBC # BLD: 4.25 M/UL — SIGNIFICANT CHANGE UP (ref 3.8–5.2)
RBC # FLD: 14.3 % — SIGNIFICANT CHANGE UP (ref 10.3–14.5)
SODIUM SERPL-SCNC: 141 MMOL/L — SIGNIFICANT CHANGE UP (ref 135–145)
TROPONIN I, HIGH SENSITIVITY RESULT: <3 NG/L — SIGNIFICANT CHANGE UP
WBC # BLD: 8.99 K/UL — SIGNIFICANT CHANGE UP (ref 3.8–10.5)
WBC # FLD AUTO: 8.99 K/UL — SIGNIFICANT CHANGE UP (ref 3.8–10.5)

## 2025-08-28 PROCEDURE — 93010 ELECTROCARDIOGRAM REPORT: CPT

## 2025-08-28 PROCEDURE — 93005 ELECTROCARDIOGRAM TRACING: CPT

## 2025-08-28 PROCEDURE — 83690 ASSAY OF LIPASE: CPT

## 2025-08-28 PROCEDURE — 99285 EMERGENCY DEPT VISIT HI MDM: CPT | Mod: 25

## 2025-08-28 PROCEDURE — 76830 TRANSVAGINAL US NON-OB: CPT | Mod: 26

## 2025-08-28 PROCEDURE — 86901 BLOOD TYPING SEROLOGIC RH(D): CPT

## 2025-08-28 PROCEDURE — 86900 BLOOD TYPING SEROLOGIC ABO: CPT

## 2025-08-28 PROCEDURE — 84484 ASSAY OF TROPONIN QUANT: CPT

## 2025-08-28 PROCEDURE — 86850 RBC ANTIBODY SCREEN: CPT

## 2025-08-28 PROCEDURE — 93970 EXTREMITY STUDY: CPT

## 2025-08-28 PROCEDURE — 76830 TRANSVAGINAL US NON-OB: CPT

## 2025-08-28 PROCEDURE — 99285 EMERGENCY DEPT VISIT HI MDM: CPT

## 2025-08-28 PROCEDURE — 84702 CHORIONIC GONADOTROPIN TEST: CPT

## 2025-08-28 PROCEDURE — 85025 COMPLETE CBC W/AUTO DIFF WBC: CPT

## 2025-08-28 PROCEDURE — 93970 EXTREMITY STUDY: CPT | Mod: 26

## 2025-08-28 PROCEDURE — 80053 COMPREHEN METABOLIC PANEL: CPT

## 2025-08-28 PROCEDURE — 36415 COLL VENOUS BLD VENIPUNCTURE: CPT

## 2025-09-02 ENCOUNTER — APPOINTMENT (OUTPATIENT)
Dept: NEUROLOGY | Facility: CLINIC | Age: 50
End: 2025-09-02

## 2025-09-02 VITALS
HEART RATE: 106 BPM | DIASTOLIC BLOOD PRESSURE: 74 MMHG | BODY MASS INDEX: 31.5 KG/M2 | WEIGHT: 196 LBS | SYSTOLIC BLOOD PRESSURE: 109 MMHG | HEIGHT: 66 IN

## 2025-09-02 DIAGNOSIS — G43.709 CHRONIC MIGRAINE W/OUT AURA, NOT INTRACTABLE, W/OUT STATUS MIGRAINOSUS: ICD-10-CM

## 2025-09-02 DIAGNOSIS — G43.009 MIGRAINE W/OUT AURA, NOT INTRACTABLE, W/OUT STATUS MIGRAINOSUS: ICD-10-CM

## 2025-09-02 DIAGNOSIS — R51.9 HEADACHE, UNSPECIFIED: ICD-10-CM

## 2025-09-02 PROCEDURE — 99214 OFFICE O/P EST MOD 30 MIN: CPT

## (undated) DEVICE — PACK LITHOTOMY

## (undated) DEVICE — PRESSURE INFUSOR BAG 1000ML

## (undated) DEVICE — SOL IRR BAG NS 0.9% 1000ML

## (undated) DEVICE — GLV 6.5 PROTEXIS (WHITE)

## (undated) DEVICE — SLV COMPRESSION KNEE MED

## (undated) DEVICE — SOL IRR POUR NS 0.9% 500ML

## (undated) DEVICE — DRAPE LIGHT HANDLE COVER (GREEN)

## (undated) DEVICE — GLV 7 PROTEXIS (WHITE)

## (undated) DEVICE — POSITIONER FOAM EGG CRATE ULNAR 2PCS (PINK)

## (undated) DEVICE — WARMING BLANKET UPPER ADULT

## (undated) DEVICE — TUBING FLUID ADMINISTRATION SET PRIM 70"

## (undated) DEVICE — DRAPE 1/2 SHEET 40X57"